# Patient Record
Sex: MALE | Race: WHITE | Employment: OTHER | URBAN - METROPOLITAN AREA
[De-identification: names, ages, dates, MRNs, and addresses within clinical notes are randomized per-mention and may not be internally consistent; named-entity substitution may affect disease eponyms.]

---

## 2017-06-04 ENCOUNTER — HOSPITAL ENCOUNTER (EMERGENCY)
Age: 82
Discharge: HOME | End: 2017-06-04
Attending: EMERGENCY MEDICINE
Payer: MEDICARE

## 2017-06-04 VITALS
TEMPERATURE: 97.8 F | HEART RATE: 61 BPM | SYSTOLIC BLOOD PRESSURE: 150 MMHG | RESPIRATION RATE: 17 BRPM | HEIGHT: 64 IN | DIASTOLIC BLOOD PRESSURE: 98 MMHG | OXYGEN SATURATION: 98 % | WEIGHT: 170 LBS | BODY MASS INDEX: 29.02 KG/M2

## 2017-06-04 DIAGNOSIS — L03.116 CELLULITIS OF LEFT LOWER EXTREMITY: Primary | ICD-10-CM

## 2017-06-04 DIAGNOSIS — I49.8 BIGEMINY: ICD-10-CM

## 2017-06-04 LAB
ALBUMIN SERPL BCP-MCNC: 3.7 G/DL (ref 3.5–5)
ALBUMIN/GLOB SERPL: 1.2 {RATIO} (ref 1.1–2.2)
ALP SERPL-CCNC: 75 U/L (ref 45–117)
ALT SERPL-CCNC: 22 U/L (ref 12–78)
ANION GAP BLD CALC-SCNC: 5 MMOL/L (ref 5–15)
AST SERPL W P-5'-P-CCNC: 18 U/L (ref 15–37)
BASOPHILS # BLD AUTO: 0 K/UL (ref 0–0.1)
BASOPHILS # BLD: 0 % (ref 0–1)
BILIRUB SERPL-MCNC: 1.4 MG/DL (ref 0.2–1)
BUN SERPL-MCNC: 25 MG/DL (ref 6–20)
BUN/CREAT SERPL: 15 (ref 12–20)
CALCIUM SERPL-MCNC: 8.5 MG/DL (ref 8.5–10.1)
CHLORIDE SERPL-SCNC: 107 MMOL/L (ref 97–108)
CK SERPL-CCNC: 107 U/L (ref 39–308)
CO2 SERPL-SCNC: 28 MMOL/L (ref 21–32)
CREAT SERPL-MCNC: 1.71 MG/DL (ref 0.7–1.3)
EOSINOPHIL # BLD: 0.2 K/UL (ref 0–0.4)
EOSINOPHIL NFR BLD: 4 % (ref 0–7)
ERYTHROCYTE [DISTWIDTH] IN BLOOD BY AUTOMATED COUNT: 13.5 % (ref 11.5–14.5)
GLOBULIN SER CALC-MCNC: 3 G/DL (ref 2–4)
GLUCOSE SERPL-MCNC: 161 MG/DL (ref 65–100)
HCT VFR BLD AUTO: 43.3 % (ref 36.6–50.3)
HGB BLD-MCNC: 14.7 G/DL (ref 12.1–17)
LYMPHOCYTES # BLD AUTO: 31 % (ref 12–49)
LYMPHOCYTES # BLD: 2.1 K/UL (ref 0.8–3.5)
MAGNESIUM SERPL-MCNC: 2.3 MG/DL (ref 1.6–2.4)
MCH RBC QN AUTO: 32.1 PG (ref 26–34)
MCHC RBC AUTO-ENTMCNC: 33.9 G/DL (ref 30–36.5)
MCV RBC AUTO: 94.5 FL (ref 80–99)
MONOCYTES # BLD: 0.5 K/UL (ref 0–1)
MONOCYTES NFR BLD AUTO: 8 % (ref 5–13)
NEUTS SEG # BLD: 3.9 K/UL (ref 1.8–8)
NEUTS SEG NFR BLD AUTO: 57 % (ref 32–75)
PLATELET # BLD AUTO: 177 K/UL (ref 150–400)
POTASSIUM SERPL-SCNC: 3.9 MMOL/L (ref 3.5–5.1)
PROT SERPL-MCNC: 6.7 G/DL (ref 6.4–8.2)
RBC # BLD AUTO: 4.58 M/UL (ref 4.1–5.7)
SODIUM SERPL-SCNC: 140 MMOL/L (ref 136–145)
TROPONIN I SERPL-MCNC: <0.04 NG/ML
TSH SERPL DL<=0.05 MIU/L-ACNC: 1.9 UIU/ML (ref 0.36–3.74)
WBC # BLD AUTO: 6.8 K/UL (ref 4.1–11.1)

## 2017-06-04 PROCEDURE — 82550 ASSAY OF CK (CPK): CPT | Performed by: EMERGENCY MEDICINE

## 2017-06-04 PROCEDURE — 84484 ASSAY OF TROPONIN QUANT: CPT | Performed by: EMERGENCY MEDICINE

## 2017-06-04 PROCEDURE — 36415 COLL VENOUS BLD VENIPUNCTURE: CPT | Performed by: EMERGENCY MEDICINE

## 2017-06-04 PROCEDURE — 74011250637 HC RX REV CODE- 250/637: Performed by: EMERGENCY MEDICINE

## 2017-06-04 PROCEDURE — 85025 COMPLETE CBC W/AUTO DIFF WBC: CPT | Performed by: EMERGENCY MEDICINE

## 2017-06-04 PROCEDURE — 80053 COMPREHEN METABOLIC PANEL: CPT | Performed by: EMERGENCY MEDICINE

## 2017-06-04 PROCEDURE — 93005 ELECTROCARDIOGRAM TRACING: CPT

## 2017-06-04 PROCEDURE — 99285 EMERGENCY DEPT VISIT HI MDM: CPT

## 2017-06-04 PROCEDURE — 84443 ASSAY THYROID STIM HORMONE: CPT | Performed by: EMERGENCY MEDICINE

## 2017-06-04 PROCEDURE — 83735 ASSAY OF MAGNESIUM: CPT | Performed by: EMERGENCY MEDICINE

## 2017-06-04 RX ORDER — NYSTATIN 100000 U/G
CREAM TOPICAL 2 TIMES DAILY
Qty: 30 G | Refills: 0 | Status: SHIPPED | OUTPATIENT
Start: 2017-06-04 | End: 2017-06-14

## 2017-06-04 RX ORDER — DOXYCYCLINE HYCLATE 100 MG
100 TABLET ORAL
Status: COMPLETED | OUTPATIENT
Start: 2017-06-04 | End: 2017-06-04

## 2017-06-04 RX ORDER — DOXYCYCLINE HYCLATE 100 MG
100 TABLET ORAL 2 TIMES DAILY
Qty: 20 TAB | Refills: 0 | Status: SHIPPED | OUTPATIENT
Start: 2017-06-04 | End: 2017-06-14

## 2017-06-04 RX ADMIN — DOXYCYCLINE HYCLATE 100 MG: 100 TABLET, COATED ORAL at 16:46

## 2017-06-04 NOTE — ED PROVIDER NOTES
HPI Comments: Bernabe Peterson is a 80 y.o. male, pmhx significant for GERD, hypercholesterolemia, thyroid disease, who presents via EMS to the ED c/o sudden onset bradycardia x today and gradually worsening pruritic and erythematous swollen bump to L inguinal area x 2 weeks. Pt reports that he went into [facility] today to have his skin problem evaluated, as he was concerned it was an infected insect bite. However, upon arriving, they told him that he was bradycardic, so they sent him over the [facility] ED for further evaluation. Pt is asymptomatic upon arrival to the ED. He states that he has been evaluated by his PCP for low HR and dx'd with bigeminy. Of note, pt was taken off of his fluid pill 4 weeks ago, which he endorses taking for 2-3 months. Additionally of note, pt is unsure of the last time he had his thyroid checked. Pt specifically denies wound pain or drainage, CP, SOB, dizziness, lightheadedness, fever, removing any ticks recently, cardiac hx, hx of abscesses, hx of DM, or having a cardiologist.     PCP: Raji Roblero MD      Social Hx: -tobacco, +EtOH (socially), -Illicit Drugs   FHx: no pertinent family hx   Medication Allergies: morphine, penicillins      There are no other complaints, changes, or physical findings at this time. The history is provided by the patient and the EMS personnel. Past Medical History:   Diagnosis Date    Abdominal pain     Dyspepsia and other specified disorders of function of stomach     GERD (gastroesophageal reflux disease)     Hypercholesterolemia     Thyroid disease        Past Surgical History:   Procedure Laterality Date    HX CATARACT REMOVAL      bilateral    HX CATARACT REMOVAL      HX CHOLECYSTECTOMY      HX GI  2010    colon surgery?     HX HERNIA REPAIR      HX OPEN CHOLECYSTECTOMY      HX ORTHOPAEDIC      ingrown left toenail-great toe    HX OTHER SURGICAL      colonoscopy    HX TONSILLECTOMY           Family History:   Problem Relation Age of Onset    Heart Disease Mother     Heart Disease Father        Social History     Social History    Marital status:      Spouse name: N/A    Number of children: N/A    Years of education: N/A     Occupational History    Not on file. Social History Main Topics    Smoking status: Never Smoker    Smokeless tobacco: Not on file    Alcohol use Yes      Comment: social    Drug use: Not on file    Sexual activity: Not on file     Other Topics Concern    Not on file     Social History Narrative    ** Merged History Encounter **              ALLERGIES: Morphine; Morphine; Pcn [penicillins]; and Pcn [penicillins]    Review of Systems   Constitutional: Negative. Negative for appetite change, chills, fatigue and fever. HENT: Negative. Negative for congestion, rhinorrhea, sinus pressure and sore throat. Eyes: Negative. Respiratory: Negative. Negative for cough, choking, chest tightness, shortness of breath and wheezing. Cardiovascular: Negative. Negative for chest pain, palpitations and leg swelling.        + bradycardia    Gastrointestinal: Negative for abdominal pain, constipation, diarrhea, nausea and vomiting. Endocrine: Negative. Genitourinary: Negative. Negative for difficulty urinating, dysuria, flank pain and urgency. Musculoskeletal: Negative. Skin: Negative. + pruritic and erythematous raised area to the L inguinal region, no drainage or pain    Neurological: Negative. Negative for dizziness, speech difficulty, weakness, light-headedness, numbness and headaches. Psychiatric/Behavioral: Negative. All other systems reviewed and are negative. Patient Vitals for the past 12 hrs:   Temp Pulse Resp BP SpO2   06/04/17 1457 - 67 19 - 99 %   06/04/17 1452 97.8 °F (36.6 °C) (!) 34 16 175/51 99 %         Physical Exam   Constitutional: He is oriented to person, place, and time. He appears well-developed and well-nourished. No distress.    HENT:   Head: Normocephalic and atraumatic. Mouth/Throat: Oropharynx is clear and moist. No oropharyngeal exudate. Eyes: Conjunctivae and EOM are normal. Pupils are equal, round, and reactive to light. Neck: Normal range of motion. Neck supple. No JVD present. No tracheal deviation present. Cardiovascular: Normal rate, regular rhythm, normal heart sounds and intact distal pulses. No murmur heard. Pulmonary/Chest: Effort normal and breath sounds normal. No stridor. No respiratory distress. He has no wheezes. He has no rales. He exhibits no tenderness. Abdominal: Soft. He exhibits no distension. There is no tenderness. There is no rebound and no guarding. Musculoskeletal: Normal range of motion. He exhibits no edema or tenderness. Area in left groin, + erythema with warmth, no underlying fluctuance, area about 2x3cm   Neurological: He is alert and oriented to person, place, and time. No cranial nerve deficit. No gross motor or sensory deficits    Skin: Skin is warm and dry. He is not diaphoretic. Psychiatric: He has a normal mood and affect. His behavior is normal.   Nursing note and vitals reviewed.        MDM  Number of Diagnoses or Management Options  Diagnosis management comments: DDx: cellulitis, abscess, electrolyte abnormality, arrhythmia        Amount and/or Complexity of Data Reviewed  Clinical lab tests: ordered and reviewed  Tests in the medicine section of CPT®: ordered and reviewed  Review and summarize past medical records: yes  Discuss the patient with other providers: yes (Cardiology )  Independent visualization of images, tracings, or specimens: yes    Patient Progress  Patient progress: stable    ED Course       Procedures     EKG- Bigeminy rate 69, 1st degree AV block, normal axis/qrs, no acute ST-T wave changes, Kelli Fill, DO      Pt in [place], there is mechanical conduction of PVCs, pt asymptomatic, will discuss with Cardiology, likely pt to be discharged, Kelli Fill, DO    CONSULT NOTE:   4:25 PM  Susan Felix DO spoke with Dr. Donny Moya,   Specialty: Cardiology  Discussed pt's hx, disposition, and available diagnostic and imaging results. Reviewed care plans. Consultant  Recommends follow up. Written by Elsy Britton ED Scribe, as dictated by Susan Felix DO.    DISCHARGE NOTE:  LABORATORY TESTS:  Recent Results (from the past 12 hour(s))   EKG, 12 LEAD, INITIAL    Collection Time: 06/04/17  2:52 PM   Result Value Ref Range    Ventricular Rate 69 BPM    Atrial Rate 69 BPM    P-R Interval 356 ms    QRS Duration 92 ms    Q-T Interval 416 ms    QTC Calculation (Bezet) 445 ms    Calculated P Axis 33 degrees    Calculated R Axis 44 degrees    Calculated T Axis -3 degrees    Diagnosis       Sinus rhythm with 1st degree AV block with frequent premature ventricular   complexes in a pattern of bigeminy  Possible Left atrial enlargement  Cannot rule out Anterior infarct , age undetermined  When compared with ECG of 11-OCT-2010 13:08,  premature ventricular complexes are now present  Inverted T waves have replaced nonspecific T wave abnormality in Inferior   leads  Nonspecific T wave abnormality now evident in Lateral leads  QT has lengthened     CBC WITH AUTOMATED DIFF    Collection Time: 06/04/17  3:06 PM   Result Value Ref Range    WBC 6.8 4.1 - 11.1 K/uL    RBC 4.58 4.10 - 5.70 M/uL    HGB 14.7 12.1 - 17.0 g/dL    HCT 43.3 36.6 - 50.3 %    MCV 94.5 80.0 - 99.0 FL    MCH 32.1 26.0 - 34.0 PG    MCHC 33.9 30.0 - 36.5 g/dL    RDW 13.5 11.5 - 14.5 %    PLATELET 993 802 - 539 K/uL    NEUTROPHILS 57 32 - 75 %    LYMPHOCYTES 31 12 - 49 %    MONOCYTES 8 5 - 13 %    EOSINOPHILS 4 0 - 7 %    BASOPHILS 0 0 - 1 %    ABS. NEUTROPHILS 3.9 1.8 - 8.0 K/UL    ABS. LYMPHOCYTES 2.1 0.8 - 3.5 K/UL    ABS. MONOCYTES 0.5 0.0 - 1.0 K/UL    ABS. EOSINOPHILS 0.2 0.0 - 0.4 K/UL    ABS.  BASOPHILS 0.0 0.0 - 0.1 K/UL   METABOLIC PANEL, COMPREHENSIVE    Collection Time: 06/04/17  3:06 PM   Result Value Ref Range Sodium 140 136 - 145 mmol/L    Potassium 3.9 3.5 - 5.1 mmol/L    Chloride 107 97 - 108 mmol/L    CO2 28 21 - 32 mmol/L    Anion gap 5 5 - 15 mmol/L    Glucose 161 (H) 65 - 100 mg/dL    BUN 25 (H) 6 - 20 MG/DL    Creatinine 1.71 (H) 0.70 - 1.30 MG/DL    BUN/Creatinine ratio 15 12 - 20      GFR est AA 46 (L) >60 ml/min/1.73m2    GFR est non-AA 38 (L) >60 ml/min/1.73m2    Calcium 8.5 8.5 - 10.1 MG/DL    Bilirubin, total 1.4 (H) 0.2 - 1.0 MG/DL    ALT (SGPT) 22 12 - 78 U/L    AST (SGOT) 18 15 - 37 U/L    Alk. phosphatase 75 45 - 117 U/L    Protein, total 6.7 6.4 - 8.2 g/dL    Albumin 3.7 3.5 - 5.0 g/dL    Globulin 3.0 2.0 - 4.0 g/dL    A-G Ratio 1.2 1.1 - 2.2     CK W/ REFLX CKMB    Collection Time: 06/04/17  3:06 PM   Result Value Ref Range     39 - 308 U/L   TROPONIN I    Collection Time: 06/04/17  3:06 PM   Result Value Ref Range    Troponin-I, Qt. <0.04 <0.05 ng/mL   MAGNESIUM    Collection Time: 06/04/17  3:06 PM   Result Value Ref Range    Magnesium 2.3 1.6 - 2.4 mg/dL   TSH 3RD GENERATION    Collection Time: 06/04/17  3:06 PM   Result Value Ref Range    TSH 1.90 0.36 - 3.74 uIU/mL     MEDICATIONS GIVEN:  Medications   doxycycline (VIBRA-TABS) tablet 100 mg (not administered)       IMPRESSION:  1. Cellulitis of left lower extremity    2. Bigeminy        PLAN:  Current Discharge Medication List        Follow-up Information     None        Return to ED if worse     DISCHARGE NOTE:  4:36 PM  Pt has been reexamined. Pt has no new complaints, changes, or physical findings. Care plan outlined and precautions discussed. All available results reviewed with pt. All medications reviewed with pt. All of pts questions and concerns addressed. Pt agrees to f/u as instructed and agrees to return to ED upon further deterioration. Pt is ready to go home.   Written by Benito Fernandez ED Scribe as dictated by Kenyatta Baker DO    ATTESTATION   This note is prepared by Benito Fernandez acting as scribe for Kenyatta Baker, DO    Harl Necessary, DO : The scribe's documentation has been prepared under my direction and personally reviewed by me in its entirety. I confirm that the note above accurately reflects all work, treatment, procedures, and medical decision making performed by me.

## 2017-06-04 NOTE — DISCHARGE INSTRUCTIONS
Cellulitis: Care Instructions  Your Care Instructions    Cellulitis is a skin infection. It often occurs after a break in the skin from a scrape, cut, bite, or puncture, or after a rash. The doctor has checked you carefully, but problems can develop later. If you notice any problems or new symptoms, get medical treatment right away. Follow-up care is a key part of your treatment and safety. Be sure to make and go to all appointments, and call your doctor if you are having problems. It's also a good idea to know your test results and keep a list of the medicines you take. How can you care for yourself at home? · Take your antibiotics as directed. Do not stop taking them just because you feel better. You need to take the full course of antibiotics. · Prop up the infected area on pillows to reduce pain and swelling. Try to keep the area above the level of your heart as often as you can. · If your doctor told you how to care for your wound, follow your doctor's instructions. If you did not get instructions, follow this general advice:  ¨ Wash the wound with clean water 2 times a day. Don't use hydrogen peroxide or alcohol, which can slow healing. ¨ You may cover the wound with a thin layer of petroleum jelly, such as Vaseline, and a nonstick bandage. ¨ Apply more petroleum jelly and replace the bandage as needed. · Be safe with medicines. Take pain medicines exactly as directed. ¨ If the doctor gave you a prescription medicine for pain, take it as prescribed. ¨ If you are not taking a prescription pain medicine, ask your doctor if you can take an over-the-counter medicine. To prevent cellulitis in the future  · Try to prevent cuts, scrapes, or other injuries to your skin. Cellulitis most often occurs where there is a break in the skin. · If you get a scrape, cut, mild burn, or bite, wash the wound with clean water as soon as you can to help avoid infection.  Don't use hydrogen peroxide or alcohol, which can slow healing. · If you have swelling in your legs (edema), support stockings and good skin care may help prevent leg sores and cellulitis. · Take care of your feet, especially if you have diabetes or other conditions that increase the risk of infection. Wear shoes and socks. Do not go barefoot. If you have athlete's foot or other skin problems on your feet, talk to your doctor about how to treat them. When should you call for help? Call your doctor now or seek immediate medical care if:  · You have signs that your infection is getting worse, such as:  ¨ Increased pain, swelling, warmth, or redness. ¨ Red streaks leading from the area. ¨ Pus draining from the area. ¨ A fever. · You get a rash. Watch closely for changes in your health, and be sure to contact your doctor if:  · You are not getting better after 1 day (24 hours). · You do not get better as expected. Where can you learn more? Go to http://sarah beth-srinivas.info/. Matt Lemons in the search box to learn more about \"Cellulitis: Care Instructions. \"  Current as of: October 13, 2016  Content Version: 11.2  © 3095-1530 [employer]. Care instructions adapted under license by [employer] (which disclaims liability or warranty for this information). If you have questions about a medical condition or this instruction, always ask your healthcare professional. [place] [address] any warranty or liability for your use of this information. Cardiac Arrhythmia: Care Instructions  Your Care Instructions    A cardiac arrhythmia is a change in the normal rhythm of the heart. Your heart may beat too fast or too slow or beat with an irregular or skipping rhythm. A change in the heart's rhythm may feel like a really strong heartbeat or a fluttering in your chest. A severe heart rhythm problem can keep the body from getting the blood it needs.  This can result in shortness of breath, lightheadedness, and fainting. You may take medicine to treat your condition. Your doctor may recommend a pacemaker or recommend catheter ablation to destroy small parts of the heart that are causing a rhythm problem. Another possible treatment is an implantable cardioverter-defibrillator (ICD). An ICD is a device that gives the heart a shock to return the heart to a normal rhythm. Follow-up care is a key part of your treatment and safety. Be sure to make and go to all appointments, and call your doctor if you are having problems. It's also a good idea to know your test results and keep a list of the medicines you take. How can you care for yourself at home? General care  · Be safe with medicines. Take your medicines exactly as prescribed. Call your doctor if you think you are having a problem with your medicine. You will get more details on the specific medicines your doctor prescribes. · If you received a pacemaker or an ICD, you will get a fact sheet about it. · Wear medical alert jewelry that says you have an abnormal heart rhythm. You can buy this at most drugstores. Lifestyle changes  · Eat a heart-healthy diet. · Stay at a healthy weight. Lose weight if you need to. · Avoid nicotine, too much alcohol, and illegal drugs (meth, speed, and cocaine). Also, get enough sleep and do not overeat. · Ask your doctor whether you can take over-the-counter medicines (such as decongestants). These can make your heart beat fast.  · Talk to your doctor about any limits to activities, such as driving, or tasks where you use power tools or ladders. Activity  · Start light exercise if your doctor says you can. Even a small amount will help you get stronger, have more energy, and manage your stress. · If your doctor recommends it, get more exercise. Walking is a good choice. Bit by bit, increase the amount you walk every day. Try for at least 30 minutes on most days of the week.  You also may want to swim, bike, or do other activities. · When you exercise, watch for signs that your heart is working too hard. You are pushing too hard if you cannot talk while you exercise. If you become short of breath or dizzy or have chest pain, sit down and rest.  · Check your pulse daily. Place two fingers on the artery at the palm side of your wrist, in line with your thumb. If your heartbeat seems uneven, talk to your doctor. When should you call for help? Call 911 anytime you think you may need emergency care. For example, call if:  · You passed out (lost consciousness). · You have symptoms of a heart attack. These may include:  ¨ Chest pain or pressure, or a strange feeling in the chest.  ¨ Sweating. ¨ Shortness of breath. ¨ Nausea or vomiting. ¨ Pain, pressure, or a strange feeling in the back, neck, jaw, or upper belly or in one or both shoulders or arms. ¨ Lightheadedness or sudden weakness. ¨ A fast or irregular heartbeat. After you call 911, the  may tell you to chew 1 adult-strength or 2 to 4 low-dose aspirin. Wait for an ambulance. Do not try to drive yourself. · You have signs of a stroke. These include:  ¨ Sudden numbness, paralysis, or weakness in your face, arm, or leg, especially on only one side of your body. ¨ New problems with walking or balance. ¨ Sudden vision changes. ¨ Drooling or slurred speech. ¨ New problems speaking or understanding simple statements, or feeling confused. ¨ A sudden, severe headache that is different from past headaches. Call your doctor now or seek immediate medical care if:  · You are dizzy or lightheaded, or you feel like you may faint. · You have new or increased shortness of breath. · You had surgery and you have signs of infection, such as:  ¨ Increased pain, swelling, warmth, or redness. ¨ Red streaks leading from the cut (incision). ¨ Pus draining from the incision. ¨ A fever.   Watch closely for changes in your health, and be sure to contact your doctor if:  · You do not get better as expected. Where can you learn more? Go to http://sarah beth-srinivas.info/. Enter T178 in the search box to learn more about \"Cardiac Arrhythmia: Care Instructions. \"  Current as of: May 5, 2016  Content Version: 11.2  © 6284-8091 [employer]. Care instructions adapted under license by [employer] (which disclaims liability or warranty for this information). If you have questions about a medical condition or this instruction, always ask your healthcare professional. [address] any warranty or liability for your use of this information.

## 2017-06-04 NOTE — ROUTINE PROCESS
Dr Linus Ferreira reviewed discharge instructions with the patient. The patient verbalized understanding. Accompanied by his son. Alert and stable to walk at discharge.

## 2017-06-05 LAB
ATRIAL RATE: 69 BPM
CALCULATED P AXIS, ECG09: 33 DEGREES
CALCULATED R AXIS, ECG10: 44 DEGREES
CALCULATED T AXIS, ECG11: -3 DEGREES
DIAGNOSIS, 93000: NORMAL
P-R INTERVAL, ECG05: 356 MS
Q-T INTERVAL, ECG07: 416 MS
QRS DURATION, ECG06: 92 MS
QTC CALCULATION (BEZET), ECG08: 445 MS
VENTRICULAR RATE, ECG03: 69 BPM

## 2017-08-29 ENCOUNTER — CLINICAL SUPPORT (OUTPATIENT)
Dept: CARDIOLOGY CLINIC | Age: 82
End: 2017-08-29

## 2017-08-29 ENCOUNTER — HOSPITAL ENCOUNTER (OUTPATIENT)
Dept: LAB | Age: 82
Discharge: HOME | End: 2017-08-29
Payer: MEDICARE

## 2017-08-29 ENCOUNTER — OFFICE VISIT (OUTPATIENT)
Dept: CARDIOLOGY CLINIC | Age: 82
End: 2017-08-29

## 2017-08-29 VITALS
SYSTOLIC BLOOD PRESSURE: 140 MMHG | RESPIRATION RATE: 16 BRPM | OXYGEN SATURATION: 96 % | HEIGHT: 64 IN | DIASTOLIC BLOOD PRESSURE: 66 MMHG | BODY MASS INDEX: 29.06 KG/M2 | HEART RATE: 65 BPM | WEIGHT: 170.2 LBS

## 2017-08-29 DIAGNOSIS — I49.3 PVC (PREMATURE VENTRICULAR CONTRACTION): ICD-10-CM

## 2017-08-29 DIAGNOSIS — R00.1 SLOW HEART RATE: ICD-10-CM

## 2017-08-29 DIAGNOSIS — I44.0 AV BLOCK, 1ST DEGREE: ICD-10-CM

## 2017-08-29 DIAGNOSIS — R00.1 SLOW HEART RATE: Primary | ICD-10-CM

## 2017-08-29 DIAGNOSIS — R00.1 BRADYCARDIA: ICD-10-CM

## 2017-08-29 DIAGNOSIS — N28.9 RENAL INSUFFICIENCY: ICD-10-CM

## 2017-08-29 PROCEDURE — 36415 COLL VENOUS BLD VENIPUNCTURE: CPT

## 2017-08-29 PROCEDURE — 80048 BASIC METABOLIC PNL TOTAL CA: CPT

## 2017-08-29 RX ORDER — MONTELUKAST SODIUM 4 MG/1
1 TABLET, CHEWABLE ORAL 2 TIMES DAILY
COMMUNITY
End: 2020-12-01 | Stop reason: SDUPTHER

## 2017-08-29 NOTE — PROGRESS NOTES
8/29/2017 11:11 AM      Subjective:     OLESYA Derek Rodrigues is seen in office today for further evaluation. Referred from ER after noted to have bradycardia and PVCs. Apparently went to urgent care and due to slow heart rate was tx to ER. He denies chest pain, chest pressure/discomfort, dyspnea, palpitations, irregular heart beats, near-syncope, syncope, fatigue, orthopnea, paroxysmal nocturnal dyspnea, exertional chest pressure/discomfort, claudication, lower extremity edema. Visit Vitals    /66 (BP 1 Location: Right arm, BP Patient Position: Sitting)    Pulse 65    Resp 16    Ht 5' 4\" (1.626 m)    Wt 170 lb 3.2 oz (77.2 kg)    SpO2 96%    BMI 29.21 kg/m2     Current Outpatient Prescriptions   Medication Sig    colestipol (COLESTID) 1 gram tablet Take 1 g by mouth two (2) times a day.  omeprazole (PRILOSEC) 20 mg capsule Take 20 mg by mouth daily.  levothyroxine (SYNTHROID) 100 mcg tablet Take 100 mcg by mouth daily (before breakfast). No current facility-administered medications for this visit. Objective:      Visit Vitals    /66 (BP 1 Location: Right arm, BP Patient Position: Sitting)    Pulse 65    Resp 16    Ht 5' 4\" (1.626 m)    Wt 170 lb 3.2 oz (77.2 kg)    SpO2 96%    BMI 29.21 kg/m2       Data Review:     EKG: Sinus rhythm, 1st degree AV block, PVCs. Vent bigeminy. Reviewed and/or ordered active problem list, medication list tests    Past Medical History:   Diagnosis Date    Abdominal pain     Dyspepsia and other specified disorders of function of stomach     GERD (gastroesophageal reflux disease)     Hypercholesterolemia     Thyroid disease       Past Surgical History:   Procedure Laterality Date    HX CATARACT REMOVAL      bilateral    HX CATARACT REMOVAL      HX CHOLECYSTECTOMY      HX GI  2010    colon surgery?     HX HERNIA REPAIR      HX OPEN CHOLECYSTECTOMY      HX ORTHOPAEDIC      ingrown left toenail-great toe    HX OTHER SURGICAL      colonoscopy    HX TONSILLECTOMY       Allergies   Allergen Reactions    Morphine Nausea and Vomiting    Morphine Nausea and Vomiting    Pcn [Penicillins] Rash and Swelling    Pcn [Penicillins] Rash      Family History   Problem Relation Age of Onset    Heart Disease Mother     Heart Disease Father       Social History     Social History    Marital status:      Spouse name: N/A    Number of children: N/A    Years of education: N/A     Occupational History    Not on file. Social History Main Topics    Smoking status: Never Smoker    Smokeless tobacco: Not on file    Alcohol use Yes      Comment: social    Drug use: Not on file    Sexual activity: Not on file     Other Topics Concern    Not on file     Social History Narrative    ** Merged History Encounter **            Review of Systems     General: Not Present- Anorexia, Chills, Dietary Changes, Fatigue, Fever, Medication Changes, Night Sweats, Weight Gain > 10lbs. and Weight Loss > 10lbs. .  Skin: Not Present- Bruising and Excessive Sweating. HEENT: Not Present- Headache, Visual Loss and Vertigo. Respiratory: Not Present- Cough, Decreased Exercise Tolerance, Difficulty Breathing, Snoring and Wheezing. Cardiovascular: Not Present- Abnormal Blood Pressure, Chest Pain, Claudications, Difficulty Breathing On Exertion, Edema, Fainting / Blacking Out, Irregular Heart Beat, Night Cramps, Orthopnea, Palpitations, Paroxysmal Nocturnal Dyspnea, Rapid Heart Rate, Shortness of Breath and Swelling of Extremities. Gastrointestinal: Not Present- Black, Tarry Stool, Bloody Stool, Diarrhea, Hematemesis, Rectal Bleeding and Vomiting. Musculoskeletal: Not Present- Muscle Pain and Muscle Weakness. Neurological: Not Present- Dizziness. Psychiatric: Not Present- Depression. Endocrine: Not Present- Cold Intolerance, Heat Intolerance and Thyroid Problems.   Hematology: Not Present- Abnormal Bleeding, Anemia, Blood Clots and Easy Bruising.       Physical Exam   The physical exam findings are as follows:       General   Mental Status - Alert. General Appearance - Cooperative and Well groomed. Not in acute distress. Orientation - Oriented to time, Oriented to place and Oriented to person. Build & Nutrition - Well developed. HEENT  Head - Normal.  Eye - Normal.      Neck   Carotid Arteries - normal upstroke. No Bruits. Chest and Lung Exam   Inspection:   Chest Wall: - Normal. Accessory muscles - No use of accessory muscles in breathing. Auscultation:   Breath sounds: - Normal.      Cardiovascular   Inspection: Jugular vein - Bilateral - Inspection Normal.  Palpation/Percussion:   Apical Impulse: - Normal.  Auscultation: Rhythm - Regular. Heart Sounds - S1 WNL and S2 WNL. No S3 or S4. Murmurs & Other Heart Sounds: Auscultation of the heart reveals - No Murmurs. Abdomen   Palpation/Percussion: Palpation and Percussion of the abdomen reveal - No Palpable abdominal masses. Auscultation: Auscultation of the abdomen reveals - Bowel sounds normal.      Peripheral Vascular   Upper Extremity: Inspection - Bilateral - No Cyanotic nailbeds or Digital clubbing. Palpation: Radial pulse - Bilateral - Normal.  Lower Extremity:   Palpation: Dorsalis pedis pulse - Bilateral - Normal. Posterior tibia pulse - Bilateral - Normal. Edema - Bilateral - trace edema. Neurologic   Mental Status: Affect - normal.  Motor: - Normal. Gait - Normal.        Assessment:       ICD-10-CM ICD-9-CM    1. Slow heart rate R00.1 427.89 AMB POC EKG ROUTINE W/ 12 LEADS, INTER & REP      METABOLIC PANEL, BASIC      2D ECHO COMPLETE ADULT (TTE) W OR WO CONTR      HOLTER MONITOR   2. PVC (premature ventricular contraction) F98.8 499.21 METABOLIC PANEL, BASIC      2D ECHO COMPLETE ADULT (TTE) W OR WO CONTR      HOLTER MONITOR   3. Bradycardia G41.3 647.82 METABOLIC PANEL, BASIC      2D ECHO COMPLETE ADULT (TTE) W OR WO CONTR      HOLTER MONITOR   4.  Renal insufficiency K01.1 697.7 METABOLIC PANEL, BASIC      2D ECHO COMPLETE ADULT (TTE) W OR WO CONTR      HOLTER MONITOR   5. AV block, 1st degree I44.0 426.11        Plan:     1. Bradycardia, PVC: clinically asymptomatic. Check Echo and Holter. Normal TSH. 2. Recheck BMP to f/u on Cr.

## 2017-08-29 NOTE — MR AVS SNAPSHOT
Visit Information Date & Time Provider Department Dept. Phone Encounter #  
 8/29/2017 10:30 AM Georgina Villalobos, [address] [facility] [phone] [id number] Follow-up Instructions Return in about 6 months (around 2/28/2018). Routing History Follow-up and Disposition History Upcoming Health Maintenance Date Due DTaP/Tdap/Td series (1 - Tdap) 10/25/1949 ZOSTER VACCINE AGE 60> 8/25/1988 GLAUCOMA SCREENING Q2Y 10/25/1993 Pneumococcal 65+ Low/Medium Risk (1 of 2 - PCV13) 10/25/1993 MEDICARE YEARLY EXAM 10/25/1993 INFLUENZA AGE 9 TO ADULT 8/1/2017 Allergies as of 8/29/2017  Review Complete On: 8/29/2017 By: Georgina Villalobos MD  
  
 Severity Noted Reaction Type Reactions Morphine  10/11/2010   Side Effect Nausea and Vomiting Morphine  07/09/2013   Side Effect Nausea and Vomiting Pcn [Penicillins]  10/11/2010   Side Effect Rash, Swelling Pcn [Penicillins]  07/09/2013   Systemic Rash Current Immunizations  Reviewed on 10/25/2010 No immunizations on file. Not reviewed this visit You Were Diagnosed With   
  
 Codes Comments Slow heart rate    -  Primary ICD-10-CM: R00.1 ICD-9-CM: 427.89 PVC (premature ventricular contraction)     ICD-10-CM: I49.3 ICD-9-CM: 427.69 Bradycardia     ICD-10-CM: R00.1 ICD-9-CM: 427.89 Renal insufficiency     ICD-10-CM: N28.9 ICD-9-CM: 593.9 AV block, 1st degree     ICD-10-CM: I44.0 ICD-9-CM: 426.11 Vitals BP Pulse Resp Height(growth percentile) Weight(growth percentile) SpO2  
 140/66 (BP 1 Location: Right arm, BP Patient Position: Sitting) 65 16 5' 4\" (1.626 m) 170 lb 3.2 oz (77.2 kg) 96% BMI Smoking Status 29.21 kg/m2 Never Smoker Vitals History BMI and BSA Data Body Mass Index Body Surface Area  
 29.21 kg/m 2 1.87 m 2 Preferred Pharmacy Pharmacy Name Phone [id number] [facility] [phone] Your Updated Medication List  
  
   
This list is accurate as of: 8/29/17 11:19 AM.  Always use your most recent med list.  
  
  
  
  
 COLESTID 1 gram tablet Generic drug:  colestipol Take 1 g by mouth two (2) times a day. omeprazole 20 mg capsule Commonly known as:  PRILOSEC Take 20 mg by mouth daily. SYNTHROID 100 mcg tablet Generic drug:  levothyroxine Take 100 mcg by mouth daily (before breakfast). We Performed the Following AMB POC EKG ROUTINE W/ 12 LEADS, INTER & REP [43813 CPT(R)] METABOLIC PANEL, BASIC [99545 CPT(R)] Follow-up Instructions Return in about 6 months (around 2/28/2018). To-Do List   
 08/29/2017 ECHO:  2D ECHO COMPLETE ADULT (TTE) W OR WO CONTR   
  
 08/29/2017 ECG:  HOLTER MONITOR Introducing [facility] SERVICES! Yahaira Wang introduces FasterPants patient portal. Now you can access parts of your medical record, email your doctor's office, and request medication refills online. 1. In your internet browser, go to [email or web address] TrustCenter 2. Click on the First Time User? Click Here link in the Sign In box. You will see the New Member Sign Up page. 3. Enter your FasterPants Access Code exactly as it appears below. You will not need to use this code after youve completed the sign-up process. If you do not sign up before the expiration date, you must request a new code. · FasterPants Access Code: [email or web address] Expires: 9/2/2017  3:39 PM 
 
4. Enter the last four digits of your Social Security Number (xxxx) and Date of Birth (mm/dd/yyyy) as indicated and click Submit. You will be taken to the next sign-up page. 5. Create a FasterPants ID. This will be your FasterPants login ID and cannot be changed, so think of one that is secure and easy to remember. 6. Create a DeciZium password. You can change your password at any time. 7. Enter your Password Reset Question and Answer. This can be used at a later time if you forget your password. 8. Enter your e-mail address. You will receive e-mail notification when new information is available in [address] 9. Click Sign Up. You can now view and download portions of your medical record. 10. Click the Download Summary menu link to download a portable copy of your medical information. If you have questions, please visit the Frequently Asked Questions section of the DeciZium website. Remember, DeciZium is NOT to be used for urgent needs. For medical emergencies, dial 911. Now available from your iPhone and Android! Please provide this summary of care documentation to your next provider. Your primary care clinician is listed as Ramona Space. If you have any questions after today's visit, please call [phone].

## 2017-08-29 NOTE — PROGRESS NOTES
Chief Complaint   Patient presents with    New Patient     referred by Dr Kadie Boudreaux due to slow heart rate    Slow Heart Rate

## 2017-08-30 ENCOUNTER — TELEPHONE (OUTPATIENT)
Dept: CARDIOLOGY CLINIC | Age: 82
End: 2017-08-30

## 2017-08-30 LAB
BUN SERPL-MCNC: 25 MG/DL (ref 8–27)
BUN/CREAT SERPL: 18 (ref 10–24)
CALCIUM SERPL-MCNC: 8.8 MG/DL (ref 8.6–10.2)
CHLORIDE SERPL-SCNC: 104 MMOL/L (ref 96–106)
CO2 SERPL-SCNC: 19 MMOL/L (ref 18–29)
CREAT SERPL-MCNC: 1.42 MG/DL (ref 0.76–1.27)
GLUCOSE SERPL-MCNC: 137 MG/DL (ref 65–99)
INTERPRETATION: NORMAL
POTASSIUM SERPL-SCNC: 4.4 MMOL/L (ref 3.5–5.2)
SODIUM SERPL-SCNC: 140 MMOL/L (ref 134–144)

## 2017-08-30 NOTE — TELEPHONE ENCOUNTER
----- Message from Marito Aranda MD sent at 8/30/2017 11:01 AM EDT -----  Inform him labs are ok. Message  Received: 4[address]MD Camilla LPN                     Inform pt that Echo is ok         Left message to return my call. Called daughter Merly Cullen on hippa form. Advised her I called her because I could not get a hold of her father and I had some wrong numbers in our system. She asked that I call back in 5 minutes and she will verify the numbers with me. I told her I would call back in 5 minutes. She verbalized that she understood everything. Returned daughter Ulisses Grimm call -on hippa form, received correct numbers from her. The pt's numbers are [phone] for home # and his cell # [phone]- advised we did not have the correct cell number. Gave her the test results. Advised that pt's labs and echo are okay. Asked that she relay message to her father and I would also try the cell number. She verbalized that she understood everything. Called pt on cell number and left message that I relayed his test results to his daughter Ulisses Grimm and he could also call me back.

## 2017-08-31 ENCOUNTER — OFFICE VISIT (OUTPATIENT)
Dept: CARDIOLOGY CLINIC | Age: 82
End: 2017-08-31

## 2017-08-31 ENCOUNTER — CLINICAL SUPPORT (OUTPATIENT)
Dept: CARDIOLOGY CLINIC | Age: 82
End: 2017-08-31

## 2017-08-31 VITALS
OXYGEN SATURATION: 98 % | SYSTOLIC BLOOD PRESSURE: 110 MMHG | WEIGHT: 171.2 LBS | RESPIRATION RATE: 18 BRPM | HEIGHT: 64 IN | BODY MASS INDEX: 29.23 KG/M2 | DIASTOLIC BLOOD PRESSURE: 66 MMHG | HEART RATE: 50 BPM

## 2017-08-31 DIAGNOSIS — I49.3 PVC (PREMATURE VENTRICULAR CONTRACTION): ICD-10-CM

## 2017-08-31 DIAGNOSIS — R00.1 BRADYCARDIA: ICD-10-CM

## 2017-08-31 DIAGNOSIS — R00.1 SLOW HEART RATE: ICD-10-CM

## 2017-08-31 DIAGNOSIS — N28.9 RENAL INSUFFICIENCY: ICD-10-CM

## 2017-08-31 DIAGNOSIS — I44.1 MOBITZ TYPE 1 SECOND DEGREE AV BLOCK: Primary | ICD-10-CM

## 2017-08-31 DIAGNOSIS — I44.0 AV BLOCK, 1ST DEGREE: ICD-10-CM

## 2017-08-31 NOTE — MR AVS SNAPSHOT
Visit Information Date & Time Provider Department Dept. Phone Encounter #  
 8/31/2017 10:00 AM Libra Solomon, 1024 [facility] [phone] [id number] Your Appointments 8/31/2017 11:00 AM  
ECHO CARDIOGRAMS 2D with [address], [facility] [address]) Appt Note: Dr. Shania Chisholm (TTE) W OR WO CONTR [[id number]] (Order [id number]) ,roxie; add on per Dr. Helen Logan, ok'd w/ Ramu  ekr [id number] [facility] [address]  
[phone] [address] [facility] [address] Upcoming Health Maintenance Date Due DTaP/Tdap/Td series (1 - Tdap) 10/25/1949 ZOSTER VACCINE AGE 60> 8/25/1988 GLAUCOMA SCREENING Q2Y 10/25/1993 Pneumococcal 65+ Low/Medium Risk (1 of 2 - PCV13) 10/25/1993 MEDICARE YEARLY EXAM 10/25/1993 INFLUENZA AGE 9 TO ADULT 8/1/2017 Allergies as of 8/31/2017  Review Complete On: 8/31/2017 By: Libra Solomon MD  
  
 Severity Noted Reaction Type Reactions Morphine  10/11/2010   Side Effect Nausea and Vomiting Morphine  07/09/2013   Side Effect Nausea and Vomiting Pcn [Penicillins]  10/11/2010   Side Effect Rash, Swelling Pcn [Penicillins]  07/09/2013   Systemic Rash Current Immunizations  Reviewed on 10/25/2010 No immunizations on file. Not reviewed this visit You Were Diagnosed With   
  
 Codes Comments Mobitz type 1 second degree AV block    -  Primary ICD-10-CM: I44.1 ICD-9-CM: 426.13 Bradycardia     ICD-10-CM: R00.1 ICD-9-CM: 427.89 AV block, 1st degree     ICD-10-CM: I44.0 ICD-9-CM: 426.11 PVC (premature ventricular contraction)     ICD-10-CM: I49.3 ICD-9-CM: 427.69 Vitals BP Pulse Resp Height(growth percentile) Weight(growth percentile) SpO2  
 110/66 (BP 1 Location: Right arm, BP Patient Position: Sitting) (!) 50 18 5' 4\" (1.626 m) 171 lb 3.2 oz (77.7 kg) 98% BMI Smoking Status 29.39 kg/m2 Never Smoker Vitals History BMI and BSA Data Body Mass Index Body Surface Area  
 29.39 kg/m 2 1.87 m 2 Preferred Pharmacy Pharmacy Name Phone [id number] [facility] [phone] Your Updated Medication List  
  
   
This list is accurate as of: 8/31/17 10:35 AM.  Always use your most recent med list.  
  
  
  
  
 COLESTID 1 gram tablet Generic drug:  colestipol Take 1 g by mouth two (2) times a day. omeprazole 20 mg capsule Commonly known as:  PRILOSEC Take 20 mg by mouth daily. SYNTHROID 100 mcg tablet Generic drug:  levothyroxine Take 100 mcg by mouth daily (before breakfast). We Performed the Following AMB POC EKG ROUTINE W/ 12 LEADS, INTER & REP [59818 CPT(R)] Introducing [facility]! Wenceslao Barlow introduces Hol[employer]a patient portal. Now you can access parts of your medical record, email your doctor's office, and request medication refills online. 1. In your internet browser, go to [email or web address] 2. Click on the First Time User? Click Here link in the Sign In box. You will see the New Member Sign Up page. 3. Enter your Holaira Access Code exactly as it appears below. You will not need to use this code after youve completed the sign-up process. If you do not sign up before the expiration date, you must request a new code. · Holaira Access Code: [email or web address] Expires: 9/2/2017  3:39 PM 
 
4. Enter the last four digits of your Social Security Number (xxxx) and Date of Birth (mm/dd/yyyy) as indicated and click Submit. You will be taken to the next sign-up page. 5. Create a Navdyt ID. This will be your Holaira login ID and cannot be changed, so think of one that is secure and easy to remember. 6. Create a Navdyt password. You can change your password at any time. 7. Enter your Password Reset Question and Answer. This can be used at a later time if you forget your password. 8. Enter your e-mail address. You will receive e-mail notification when new information is available in [address] 9. Click Sign Up. You can now view and download portions of your medical record. 10. Click the Download Summary menu link to download a portable copy of your medical information. If you have questions, please visit the Frequently Asked Questions section of the WISE s.r.l website. Remember, W[employer] s[employer].l is NOT to be used for urgent needs. For medical emergencies, dial 911. Now available from your iPhone and Android! Please provide this summary of care documentation to your next provider. Your primary care clinician is listed as Harsha Cohen. If you have any questions after today's visit, please call [phone].

## 2017-08-31 NOTE — PROGRESS NOTES
8/31/2017 10:34 AM      Subjective:     OLESYA Butler is here for f/u visit. He denies chest pain, chest pressure/discomfort, dyspnea, palpitations, irregular heart beats, near-syncope, syncope, fatigue, orthopnea, paroxysmal nocturnal dyspnea, exertional chest pressure/discomfort, claudication, lower extremity edema. Visit Vitals    /66 (BP 1 Location: Right arm, BP Patient Position: Sitting)    Pulse (!) 50    Resp 18    Ht 5' 4\" (1.626 m)    Wt 171 lb 3.2 oz (77.7 kg)    SpO2 98%    BMI 29.39 kg/m2     Current Outpatient Prescriptions   Medication Sig    colestipol (COLESTID) 1 gram tablet Take 1 g by mouth two (2) times a day.  omeprazole (PRILOSEC) 20 mg capsule Take 20 mg by mouth daily.  levothyroxine (SYNTHROID) 100 mcg tablet Take 100 mcg by mouth daily (before breakfast). No current facility-administered medications for this visit. Objective:      Visit Vitals    /66 (BP 1 Location: Right arm, BP Patient Position: Sitting)    Pulse (!) 50    Resp 18    Ht 5' 4\" (1.626 m)    Wt 171 lb 3.2 oz (77.7 kg)    SpO2 98%    BMI 29.39 kg/m2       Data Review:    EKG: Normal sinus rhythm, 1st degree AV block, PVCs    Reviewed and/or ordered active problem list, medication list tests    Past Medical History:   Diagnosis Date    Abdominal pain     Dyspepsia and other specified disorders of function of stomach     GERD (gastroesophageal reflux disease)     Hypercholesterolemia     Thyroid disease       Past Surgical History:   Procedure Laterality Date    HX CATARACT REMOVAL      bilateral    HX CATARACT REMOVAL      HX CHOLECYSTECTOMY      HX GI  2010    colon surgery?     HX HERNIA REPAIR      HX OPEN CHOLECYSTECTOMY      HX ORTHOPAEDIC      ingrown left toenail-great toe    HX OTHER SURGICAL      colonoscopy    HX TONSILLECTOMY       Allergies   Allergen Reactions    Morphine Nausea and Vomiting    Morphine Nausea and Vomiting  Pcn [Penicillins] Rash and Swelling    Pcn [Penicillins] Rash      Family History   Problem Relation Age of Onset    Heart Disease Mother     Heart Disease Father       Social History     Social History    Marital status:      Spouse name: N/A    Number of children: N/A    Years of education: N/A     Occupational History    Not on file. Social History Main Topics    Smoking status: Never Smoker    Smokeless tobacco: Not on file    Alcohol use Yes      Comment: social    Drug use: Not on file    Sexual activity: Not on file     Other Topics Concern    Not on file     Social History Narrative    ** Merged History Encounter **            Review of Systems   General: Not Present- Dietary Changes, Fatigue and Medication Changes. Skin: Not Present- Bruising and Excessive Sweating. HEENT: Not Present- Headache, Visual Loss and Vertigo. Respiratory: Not Present- Cough, Decreased Exercise Tolerance and Wheezing. Cardiovascular: Not Present- Abnormal Blood Pressure, Chest Pain, Claudications, Difficulty Breathing Lying Down, Difficulty Breathing On Exertion, Edema, Fainting / Blacking Out, Irregular Heart Beat, Palpitations, Paroxysmal Nocturnal Dyspnea, Rapid Heart Rate and Shortness of Breath. Gastrointestinal: Not Present- Black, Tarry Stool, Bloody Stool, Diarrhea, Hematemesis, Rectal Bleeding and Vomiting. Musculoskeletal: Not Present- Muscle Pain and Muscle Weakness. Neurological: Not Present- Dizziness. Psychiatric: Not Present- Depression. Endocrine: Not Present- Cold Intolerance, Heat Intolerance and Thyroid Problems. Hematology: Not Present- Abnormal Bleeding, Anemia, Blood Clots and Easy Bruising.       Physical Exam   The physical exam findings are as follows:       General   Mental Status - Alert. General Appearance - Not in acute distress. Neck   Carotid Arteries - normal upstroke. No Bruits.       Chest and Lung Exam   Inspection: Accessory muscles - No use of accessory muscles in breathing. Auscultation:   Breath sounds: - Normal.      Cardiovascular   Inspection: Jugular vein - Bilateral - Inspection Normal.  Palpation/Percussion:   Apical Impulse: - Normal.  Auscultation: Rhythm - Regular. Heart Sounds - S1 WNL and S2 WNL. No S3 or S4. Murmurs & Other Heart Sounds: Auscultation of the heart reveals - No Murmurs. Abdomen   Palpation/Percussion: Palpation and Percussion of the abdomen reveal - No Palpable abdominal masses. Auscultation: Auscultation of the abdomen reveals - Bowel sounds normal.      Peripheral Vascular   Upper Extremity: Inspection - Bilateral - No Cyanotic nailbeds or Digital clubbing. Lower Extremity:   Palpation: Edema - Bilateral - No edema. Neurologic   Mental Status: Affect - normal.  Motor: - Normal. Gait - Normal.      Assessment:       ICD-10-CM ICD-9-CM    1. Mobitz type 1 second degree AV block I44.1 426.13    2. Bradycardia R00.1 427.89 AMB POC EKG ROUTINE W/ 12 LEADS, INTER & REP   3. AV block, 1st degree I44.0 426.11    4. PVC (premature ventricular contraction) I49.3 427.69        Plan:     1. Monitor reviewed. Not on any -ve chronotropics. Normal electrolytes. Clinically asymptomatic. Will get Echo today. If normal LVEF then follow clinically.

## 2017-12-05 ENCOUNTER — OFFICE VISIT (OUTPATIENT)
Dept: CARDIOLOGY CLINIC | Age: 82
End: 2017-12-05

## 2017-12-05 VITALS
HEART RATE: 60 BPM | SYSTOLIC BLOOD PRESSURE: 130 MMHG | WEIGHT: 172.3 LBS | OXYGEN SATURATION: 97 % | HEIGHT: 64 IN | DIASTOLIC BLOOD PRESSURE: 80 MMHG | RESPIRATION RATE: 16 BRPM | BODY MASS INDEX: 29.41 KG/M2

## 2017-12-05 DIAGNOSIS — I49.3 PVC (PREMATURE VENTRICULAR CONTRACTION): ICD-10-CM

## 2017-12-05 DIAGNOSIS — I44.0 AV BLOCK, 1ST DEGREE: Primary | ICD-10-CM

## 2017-12-05 DIAGNOSIS — R00.1 BRADYCARDIA: ICD-10-CM

## 2017-12-05 NOTE — PROGRESS NOTES
1. Have you been to the ER, urgent care clinic since your last visit? Hospitalized since your last visit? No    2. Have you seen or consulted any other health care providers outside of the [address] [place] [address] since your last visit? Include any pap smears or colon screening. Yes When: 11/2071 flu shot at PCP office.      Patient has no cardiac complaints his pulse oxygen reads 30 BPM.

## 2017-12-05 NOTE — MR AVS SNAPSHOT
Visit Information Date & Time Provider Department Dept. Phone Encounter #  
 12/5/2017 11:00 AM Deena Lopez, [address] [facility] [phone] [id number] Follow-up Instructions Return in about 6 months (around 6/5/2018). Upcoming Health Maintenance Date Due DTaP/Tdap/Td series (1 - Tdap) 10/25/1949 ZOSTER VACCINE AGE 60> 8/25/1988 GLAUCOMA SCREENING Q2Y 10/25/1993 Pneumococcal 65+ Low/Medium Risk (1 of 2 - PCV13) 10/25/1993 MEDICARE YEARLY EXAM 10/25/1993 Influenza Age 5 to Adult 8/1/2017 Allergies as of 12/5/2017  Review Complete On: 12/5/2017 By: Deena Lopez MD  
  
 Severity Noted Reaction Type Reactions Morphine  10/11/2010   Side Effect Nausea and Vomiting Morphine  07/09/2013   Side Effect Nausea and Vomiting Pcn [Penicillins]  10/11/2010   Side Effect Rash, Swelling Pcn [Penicillins]  07/09/2013   Systemic Rash Current Immunizations  Reviewed on 10/25/2010 No immunizations on file. Not reviewed this visit You Were Diagnosed With   
  
 Codes Comments AV block, 1st degree    -  Primary ICD-10-CM: I44.0 ICD-9-CM: 426.11 Bradycardia     ICD-10-CM: R00.1 ICD-9-CM: 427.89 PVC (premature ventricular contraction)     ICD-10-CM: I49.3 ICD-9-CM: 427.69 Vitals BP Pulse Resp Height(growth percentile) Weight(growth percentile) SpO2  
 130/80 (BP 1 Location: Right arm, BP Patient Position: Sitting) 60 16 5' 4\" (1.626 m) 172 lb 4.8 oz (78.2 kg) 97% BMI Smoking Status 29.58 kg/m2 Never Smoker Vitals History BMI and BSA Data Body Mass Index Body Surface Area  
 29.58 kg/m 2 1.88 m 2 Preferred Pharmacy Pharmacy Name Phone [phone] [facility] [phone] Your Updated Medication List  
  
   
 This list is accurate as of: 12/5/17 11:24 AM.  Always use your most recent med list.  
  
  
  
  
 COLESTID 1 gram tablet Generic drug:  colestipol Take 1 g by mouth three (3) times daily. omeprazole 20 mg capsule Commonly known as:  PRILOSEC Take 20 mg by mouth daily. SYNTHROID 100 mcg tablet Generic drug:  levothyroxine Take 100 mcg by mouth daily (before breakfast). We Performed the Following AMB POC EKG ROUTINE W/ 12 LEADS, INTER & REP [96115 CPT(R)] Follow-up Instructions Return in about 6 months (around 6/5/2018). Introducing [facility]! Evelin Johnson introduces [employer] patient portal. Now you can access parts of your medical record, email your doctor's office, and request medication refills online. 1. In your internet browser, go to [email or web address] 2. Click on the First Time User? Click Here link in the Sign In box. You will see the New Member Sign Up page. 3. Enter your [employer] Access Code exactly as it appears below. You will not need to use this code after youve completed the sign-up process. If you do not sign up before the expiration date, you must request a new code. · [employer] Access Code: [email or web address] Expires: 3/5/2018 11:22 AM 
 
4. Enter the last four digits of your Social Security Number (xxxx) and Date of Birth (mm/dd/yyyy) as indicated and click Submit. You will be taken to the next sign-up page. 5. Create a [employer] [place] ID. This will be your [employer] login ID and cannot be changed, so think of one that is secure and easy to remember. 6. Create a [employer] password. You can change your password at any time. 7. Enter your Password Reset Question and Answer. This can be used at a later time if you forget your password. 8. Enter your e-mail address. You will receive e-mail notification when new information is available in [address] 9. Click Sign Up. You can now view and download portions of your medical record. 10. Click the Download Summary menu link to download a portable copy of your medical information. If you have questions, please visit the Frequently Asked Questions section of the [employer] website. Remember, [employer] is NOT to be used for urgent needs. For medical emergencies, dial 911. Now available from your iPhone and Android! Please provide this summary of care documentation to your next provider. Your primary care clinician is listed as Trenda Benton. If you have any questions after today's visit, please call [phone].

## 2018-06-12 ENCOUNTER — OFFICE VISIT (OUTPATIENT)
Dept: CARDIOLOGY CLINIC | Age: 83
End: 2018-06-12

## 2018-06-12 VITALS
SYSTOLIC BLOOD PRESSURE: 140 MMHG | BODY MASS INDEX: 29.6 KG/M2 | WEIGHT: 173.4 LBS | RESPIRATION RATE: 16 BRPM | OXYGEN SATURATION: 98 % | DIASTOLIC BLOOD PRESSURE: 80 MMHG | HEIGHT: 64 IN | HEART RATE: 64 BPM

## 2018-06-12 DIAGNOSIS — I44.1 MOBITZ TYPE 1 SECOND DEGREE AV BLOCK: ICD-10-CM

## 2018-06-12 DIAGNOSIS — I49.3 PVC (PREMATURE VENTRICULAR CONTRACTION): ICD-10-CM

## 2018-06-12 DIAGNOSIS — R00.1 BRADYCARDIA: Primary | ICD-10-CM

## 2018-06-12 DIAGNOSIS — E78.00 HYPERCHOLESTEROLEMIA: ICD-10-CM

## 2018-06-12 RX ORDER — HYDROCORTISONE VALERATE 2 MG/G
CREAM TOPICAL
Refills: 1 | COMMUNITY
Start: 2018-05-14 | End: 2019-06-17

## 2018-06-12 RX ORDER — LORAZEPAM 0.5 MG/1
TABLET ORAL
Refills: 0 | COMMUNITY
Start: 2018-04-12 | End: 2018-06-13 | Stop reason: CLARIF

## 2018-06-12 NOTE — PROGRESS NOTES
1. Have you been to the ER, urgent care clinic since your last visit? Hospitalized since your last visit? No    2. Have you seen or consulted any other health care providers outside of the [address] [place] [address] since your last visit? Include any pap smears or colon screening.  No    Chief Complaint   Patient presents with    Slow Heart Rate     follow up    Shortness of Breath

## 2018-06-12 NOTE — PROGRESS NOTES
6/12/2018 11:48 AM      Subjective:     OLESYA West is here for f/u visit. He denies chest pain, chest pressure/discomfort, dyspnea, palpitations, irregular heart beats, near-syncope, syncope, fatigue, orthopnea, paroxysmal nocturnal dyspnea, exertional chest pressure/discomfort, claudication, lower extremity edema, tachypnea. Visit Vitals    /80    Pulse 64    Resp 16    Ht 5' 4\" (1.626 m)    Wt 173 lb 6.4 oz (78.7 kg)    SpO2 98%    BMI 29.76 kg/m2     Current Outpatient Prescriptions   Medication Sig    LORazepam (ATIVAN) 0.5 mg tablet TAKE 1 TABLET BY MOUTH 3 TIMES A DAY DAILY    colestipol (COLESTID) 1 gram tablet Take 1 g by mouth three (3) times daily.  omeprazole (PRILOSEC) 20 mg capsule Take 20 mg by mouth daily.  levothyroxine (SYNTHROID) 100 mcg tablet Take 100 mcg by mouth daily (before breakfast).  hydrocortisone valerate (WESTCORT) 0.2 % topical cream APPLY TO AFFECTED AREA DAILY AS NEEDED     No current facility-administered medications for this visit. Objective:      Visit Vitals    /80    Pulse 64    Resp 16    Ht 5' 4\" (1.626 m)    Wt 173 lb 6.4 oz (78.7 kg)    SpO2 98%    BMI 29.76 kg/m2       Data Review:     EKG: Sinus bradycardia, 1st degree AV block, PVC    Reviewed and/or ordered active problem list, medication list tests    Past Medical History:   Diagnosis Date    Abdominal pain     Dyspepsia and other specified disorders of function of stomach     GERD (gastroesophageal reflux disease)     Hypercholesterolemia     Thyroid disease       Past Surgical History:   Procedure Laterality Date    HX CATARACT REMOVAL      bilateral    HX CATARACT REMOVAL      HX CHOLECYSTECTOMY      HX GI  2010    colon surgery?     HX HERNIA REPAIR      HX OPEN CHOLECYSTECTOMY      HX ORTHOPAEDIC      ingrown left toenail-great toe    HX OTHER SURGICAL      colonoscopy    HX TONSILLECTOMY       Allergies   Allergen Reactions  Morphine Nausea and Vomiting    Morphine Nausea and Vomiting    Pcn [Penicillins] Rash and Swelling    Pcn [Penicillins] Rash      Family History   Problem Relation Age of Onset    Heart Disease Mother     Heart Disease Father       Social History     Social History    Marital status:      Spouse name: N/A    Number of children: N/A    Years of education: N/A     Occupational History    Not on file. Social History Main Topics    Smoking status: Never Smoker    Smokeless tobacco: Never Used    Alcohol use Yes      Comment: social    Drug use: Not on file    Sexual activity: Not on file     Other Topics Concern    Not on file     Social History Narrative    ** Merged History Encounter **              Review of Systems     General: Not Present- Anorexia, Chills, Dietary Changes, Fatigue, Fever, Medication Changes, Night Sweats, Weight Gain > 10lbs. and Weight Loss > 10lbs. .  Skin: Not Present- Bruising and Excessive Sweating. HEENT: Not Present- Headache, Visual Loss and Vertigo. Respiratory: Not Present- Cough, Decreased Exercise Tolerance, Difficulty Breathing, Snoring and Wheezing. Cardiovascular: Not Present- Chest Pain, Claudications, Difficulty Breathing On Exertion, Edema, Fainting / Blacking Out, Irregular Heart Beat, Night Cramps, Orthopnea, Palpitations, Paroxysmal Nocturnal Dyspnea, Rapid Heart Rate, Shortness of Breath and Swelling of Extremities. Gastrointestinal: Not Present- Black, Tarry Stool, Bloody Stool, Diarrhea, Hematemesis, Rectal Bleeding and Vomiting. Musculoskeletal: Not Present- Muscle Pain and Muscle Weakness. Neurological: Not Present- Dizziness. Psychiatric: Not Present- Depression. Endocrine: Not Present- Cold Intolerance, Heat Intolerance and Thyroid Problems. Hematology: Not Present- Abnormal Bleeding, Anemia, Blood Clots and Easy Bruising.       Physical Exam   The physical exam findings are as follows:       General   Mental Status - Alert. General Appearance - Not in acute distress. Chest and Lung Exam   Inspection: Accessory muscles - No use of accessory muscles in breathing. Auscultation:   Breath sounds: - Normal.      Cardiovascular   Inspection: Jugular vein - Bilateral - Inspection Normal.  Palpation/Percussion:   Apical Impulse: - Normal.  Auscultation: Rhythm - Regular. Heart Sounds - S1 WNL and S2 WNL. No S3 or S4. Murmurs & Other Heart Sounds: Auscultation of the heart reveals - No Murmurs. Carotid arteries - No Carotid bruit. Peripheral Vascular   Upper Extremity: Inspection - Bilateral - No Cyanotic nailbeds or Digital clubbing. Lower Extremity:   Palpation: Edema - Bilateral - No edema. Assessment:       ICD-10-CM ICD-9-CM    1. Bradycardia R00.1 427.89 LIPID PANEL      METABOLIC PANEL, COMPREHENSIVE      TSH 3RD GENERATION   2. Hypercholesterolemia E78.00 272.0 AMB POC EKG ROUTINE W/ 12 LEADS, INTER & REP      LIPID PANEL      METABOLIC PANEL, COMPREHENSIVE      TSH 3RD GENERATION   3. PVC (premature ventricular contraction) I49.3 427.69 LIPID PANEL      METABOLIC PANEL, COMPREHENSIVE      TSH 3RD GENERATION   4. Mobitz type 1 second degree AV block I44.1 426.13 LIPID PANEL      METABOLIC PANEL, COMPREHENSIVE      TSH 3RD GENERATION       Plan:     1. Sinus bradycardia, 1st degree AV block. Continue to monitor. No -ve chronotropics. Normal LVEF on last Echo.   2. Check labs.

## 2018-06-12 NOTE — MR AVS SNAPSHOT
[address] [facility] 
[phone] Patient: Roxane Clemente MRN: [id number] :10/25/1928 Visit Information Date & Time Provider Department Dept. Phone Encounter #  
 2018 11:15 AM Lucio Chisholm, 1024 [facility] [phone] [id number] Follow-up Instructions Return in about 6 months (around 2018). Routing History Follow-up and Disposition History Your Appointments 2018 10:45 AM  
ESTABLISHED PATIENT with Lucio Chisholm MD  
[facility] [address] [facility]) Appt Note: Per Dr. Ellen Taylor, 6 mo f/u, per pt request needed appt on M,T,W AM after 10:30AM-scc [id number] [facility]  
[phone] [address] [facility] Upcoming Health Maintenance Date Due DTaP/Tdap/Td series (1 - Tdap) 10/25/1949 ZOSTER VACCINE AGE 60> 1988 GLAUCOMA SCREENING Q2Y 10/25/1993 Pneumococcal 65+ Low/Medium Risk (1 of 2 - PCV13) 10/25/1993 MEDICARE YEARLY EXAM 3/14/2018 Influenza Age 5 to Adult 2018 Allergies as of 2018  Review Complete On: 2018 By: Lucio Chisholm MD  
  
 Severity Noted Reaction Type Reactions Morphine  10/11/2010   Side Effect Nausea and Vomiting Morphine  2013   Side Effect Nausea and Vomiting Pcn [Penicillins]  10/11/2010   Side Effect Rash, Swelling Pcn [Penicillins]  2013   Systemic Rash Current Immunizations  Reviewed on 10/25/2010 No immunizations on file. Not reviewed this visit You Were Diagnosed With   
  
 Codes Comments Bradycardia    -  Primary ICD-10-CM: R00.1 ICD-9-CM: 427.89 Hypercholesterolemia     ICD-10-CM: E78.00 ICD-9-CM: 272.0 PVC (premature ventricular contraction)     ICD-10-CM: I49.3 ICD-9-CM: 427.69   
 [place]on type 1 second degree AV block     ICD-10-CM: I44.1 ICD-9-CM: 426.13 Vitals BP Pulse Resp Height(growth percentile) Weight(growth percentile) SpO2  
 140/80 64 16 5' 4\" (1.626 m) 173 lb 6.4 oz (78.7 kg) 98% BMI Smoking Status 29.76 kg/m2 Never Smoker Vitals History BMI and BSA Data Body Mass Index Body Surface Area  
 29.76 kg/m 2 1.89 m 2 Preferred Pharmacy Pharmacy Name Phone [id number] [phone] [facility] [phone] Your Updated Medication List  
  
   
This list is accurate as of 6/12/18 11:54 AM.  Always use your most recent med list.  
  
  
  
  
 COLESTID 1 gram tablet Generic drug:  colestipol Take 1 g by mouth three (3) times daily. hydrocortisone valerate 0.2 % topical cream  
Commonly known as:  [address] DAILY AS NEEDED LORazepam 0.5 mg tablet Commonly known as:  ATIVAN  
TAKE 1 TABLET BY MOUTH 3 TIMES A DAY DAILY  
  
 omeprazole 20 mg capsule Commonly known as:  PRILOSEC Take 20 mg by mouth daily. SYNTHROID 100 mcg tablet Generic drug:  levothyroxine Take 100 mcg by mouth daily (before breakfast). We Performed the Following AMB POC EKG ROUTINE W/ 12 LEADS, INTER & REP [38040 CPT(R)] LIPID PANEL [60056 CPT(R)] METABOLIC PANEL, COMPREHENSIVE [55559 CPT(R)] TSH 3RD GENERATION [29738 CPT(R)] Follow-up Instructions Return in about 6 months (around 12/12/2018). Introducing [facility]! [employer] introduces [employer] patient portal. Now you can access parts of your medical record, email your doctor's office, and request medication refills online. 1. In your internet browser, go to [email or web address] 2. Click on the First Time User? Click Here link in the Sign In box. You will see the New Member Sign Up page. 3. Enter your Playfire Access Code exactly as it appears below. You will not need to use this code after youve completed the sign-up process. If you do not sign up before the expiration date, you must request a new code. · Playfire Access Code: [email or web address] Expires: 9/10/2018 11:54 AM 
 
4. Enter the last four digits of your Social Security Number (xxxx) and Date of Birth (mm/dd/yyyy) as indicated and click Submit. You will be taken to the next sign-up page. 5. Create a Playfire ID. This will be your Playfire login ID and cannot be changed, so think of one that is secure and easy to remember. 6. Create a Playfire password. You can change your password at any time. 7. Enter your Password Reset Question and Answer. This can be used at a later time if you forget your password. 8. Enter your e-mail address. You will receive e-mail notification when new information is available in [address] 9. Click Sign Up. You can now view and download portions of your medical record. 10. Click the Download Summary menu link to download a portable copy of your medical information. If you have questions, please visit the Frequently Asked Questions section of the Play[employer] website. Remember, [employer] is NOT to be used for urgent needs. For medical emergencies, dial 911. Now available from your iPhone and Android! Please provide this summary of care documentation to your next provider. Your primary care clinician is listed as John Hannon. If you have any questions after today's visit, please call [phone].

## 2018-06-13 ENCOUNTER — TELEPHONE (OUTPATIENT)
Dept: CARDIOLOGY CLINIC | Age: 83
End: 2018-06-13

## 2018-06-13 ENCOUNTER — HOSPITAL ENCOUNTER (OUTPATIENT)
Dept: LAB | Age: 83
Discharge: HOME | End: 2018-06-13
Payer: MEDICARE

## 2018-06-13 PROCEDURE — 80061 LIPID PANEL: CPT

## 2018-06-13 PROCEDURE — 84443 ASSAY THYROID STIM HORMONE: CPT

## 2018-06-13 PROCEDURE — 36415 COLL VENOUS BLD VENIPUNCTURE: CPT

## 2018-06-13 PROCEDURE — 80053 COMPREHEN METABOLIC PANEL: CPT

## 2018-06-13 NOTE — TELEPHONE ENCOUNTER
Pt's said while looking at his med rec sheet on AVS he noticed that his wife's ativan script was listed as one of his own and he wanted to let us know so we could remove it.   Thanks, Amy Shah

## 2018-06-14 LAB
ALBUMIN SERPL-MCNC: 4.5 G/DL (ref 3.5–4.7)
ALBUMIN/GLOB SERPL: 2.3 {RATIO} (ref 1.2–2.2)
ALP SERPL-CCNC: 95 IU/L (ref 39–117)
ALT SERPL-CCNC: 14 IU/L (ref 0–44)
AST SERPL-CCNC: 22 IU/L (ref 0–40)
BILIRUB SERPL-MCNC: 1 MG/DL (ref 0–1.2)
BUN SERPL-MCNC: 18 MG/DL (ref 8–27)
BUN/CREAT SERPL: 13 (ref 10–24)
CALCIUM SERPL-MCNC: 9.2 MG/DL (ref 8.6–10.2)
CHLORIDE SERPL-SCNC: 102 MMOL/L (ref 96–106)
CHOLEST SERPL-MCNC: 194 MG/DL (ref 100–199)
CO2 SERPL-SCNC: 25 MMOL/L (ref 20–29)
CREAT SERPL-MCNC: 1.4 MG/DL (ref 0.76–1.27)
GFR SERPLBLD CREATININE-BSD FMLA CKD-EPI: 44 ML/MIN/1.73
GFR SERPLBLD CREATININE-BSD FMLA CKD-EPI: 51 ML/MIN/1.73
GLOBULIN SER CALC-MCNC: 2 G/DL (ref 1.5–4.5)
GLUCOSE SERPL-MCNC: 107 MG/DL (ref 65–99)
HDLC SERPL-MCNC: 51 MG/DL
INTERPRETATION, 910389: NORMAL
INTERPRETATION: NORMAL
LDLC SERPL CALC-MCNC: 114 MG/DL (ref 0–99)
PDF IMAGE, 910387: NORMAL
POTASSIUM SERPL-SCNC: 4.8 MMOL/L (ref 3.5–5.2)
PROT SERPL-MCNC: 6.5 G/DL (ref 6–8.5)
SODIUM SERPL-SCNC: 140 MMOL/L (ref 134–144)
TRIGL SERPL-MCNC: 143 MG/DL (ref 0–149)
TSH SERPL DL<=0.005 MIU/L-ACNC: 1.64 UIU/ML (ref 0.45–4.5)
VLDLC SERPL CALC-MCNC: 29 MG/DL (ref 5–40)

## 2018-06-18 ENCOUNTER — TELEPHONE (OUTPATIENT)
Dept: CARDIOLOGY CLINIC | Age: 83
End: 2018-06-18

## 2018-06-18 NOTE — TELEPHONE ENCOUNTER
----- Message from Anusha Golden MD sent at 6/14/2018  3:22 PM EDT -----  Inform him labs are k       Called pt,verified pt with two pt identifiers, told pt that  reviewed recent labs and they look okay. Went over the TSH and Lipid with pt. Pt verbalized understanding.

## 2018-12-04 ENCOUNTER — OFFICE VISIT (OUTPATIENT)
Dept: CARDIOLOGY CLINIC | Age: 83
End: 2018-12-04

## 2018-12-04 VITALS
DIASTOLIC BLOOD PRESSURE: 70 MMHG | OXYGEN SATURATION: 98 % | BODY MASS INDEX: 28.95 KG/M2 | WEIGHT: 169.6 LBS | HEART RATE: 54 BPM | SYSTOLIC BLOOD PRESSURE: 120 MMHG | HEIGHT: 64 IN

## 2018-12-04 DIAGNOSIS — R00.1 BRADYCARDIA: Primary | ICD-10-CM

## 2018-12-04 DIAGNOSIS — I49.3 PVC (PREMATURE VENTRICULAR CONTRACTION): ICD-10-CM

## 2018-12-04 DIAGNOSIS — I44.0 AV BLOCK, 1ST DEGREE: ICD-10-CM

## 2018-12-04 DIAGNOSIS — I44.1 MOBITZ TYPE 1 SECOND DEGREE AV BLOCK: ICD-10-CM

## 2018-12-04 RX ORDER — DOXYCYCLINE 100 MG/1
CAPSULE ORAL
Refills: 1 | COMMUNITY
Start: 2018-11-21 | End: 2019-06-17 | Stop reason: ALTCHOICE

## 2018-12-04 RX ORDER — CHLORHEXIDINE GLUCONATE 1.2 MG/ML
RINSE ORAL
Refills: 1 | COMMUNITY
Start: 2018-11-21 | End: 2019-06-17

## 2018-12-04 RX ORDER — CLINDAMYCIN HYDROCHLORIDE 150 MG/1
CAPSULE ORAL
Refills: 1 | COMMUNITY
Start: 2018-11-19 | End: 2019-06-17 | Stop reason: ALTCHOICE

## 2018-12-04 NOTE — PROGRESS NOTES
12/4/2018 11:34 AM 
 
 
Subjective:  
 
OLESYA Osmane Elder is here for f/u visit. Doing very well from CV standpoint. He denies chest pain, chest pressure/discomfort, dyspnea, palpitations, irregular heart beats, near-syncope, syncope, fatigue, orthopnea, paroxysmal nocturnal dyspnea, exertional chest pressure/discomfort, claudication, lower extremity edema, tachypnea. Visit Vitals /70 (BP 1 Location: Right arm, BP Patient Position: Sitting) Pulse (!) 54 Ht 5' 4\" (1.626 m) Wt 169 lb 9.6 oz (76.9 kg) SpO2 98% BMI 29.11 kg/m² Current Outpatient Medications Medication Sig  colestipol (COLESTID) 1 gram tablet Take 1 g by mouth daily.  omeprazole (PRILOSEC) 20 mg capsule Take 20 mg by mouth daily.  levothyroxine (SYNTHROID) 100 mcg tablet Take 100 mcg by mouth daily (before breakfast).  chlorhexidine (PERIDEX) 0.12 % solution RINSE WITH 1/2 OZ TWICE DAILY FOR 30 SECONDS, THEN SPIT.  clindamycin (CLEOCIN) 150 mg capsule TAKE 1 CAPSULE BY MOUTH FOUR TIMES A DAY  doxycycline (VIBRAMYCIN) 100 mg capsule TAKE ONE CAPSULE BY MOUTH EVERY 12 HOURS  hydrocortisone valerate (WESTCORT) 0.2 % topical cream APPLY TO AFFECTED AREA DAILY AS NEEDED No current facility-administered medications for this visit. Objective:  
  
Visit Vitals /70 (BP 1 Location: Right arm, BP Patient Position: Sitting) Pulse (!) 54 Ht 5' 4\" (1.626 m) Wt 169 lb 9.6 oz (76.9 kg) SpO2 98% BMI 29.11 kg/m² Data Review:  
 
EKG: Normal sinus rhythm, septal infarct, 1st degree AV block, PVCs. unchanged Reviewed and/or ordered active problem list, medication list tests Past Medical History:  
Diagnosis Date  Abdominal pain  Dyspepsia and other specified disorders of function of stomach  GERD (gastroesophageal reflux disease)  Hypercholesterolemia  Thyroid disease Past Surgical History:  
Procedure Laterality Date  HX CATARACT REMOVAL    
 bilateral  
 HX CATARACT REMOVAL    
 HX CHOLECYSTECTOMY  HX GI  2010  
 colon surgery?  HX HERNIA REPAIR    
 HX OPEN CHOLECYSTECTOMY  HX ORTHOPAEDIC    
 ingrown left toenail-great toe  HX OTHER SURGICAL    
 colonoscopy  HX TONSILLECTOMY Allergies Allergen Reactions  Morphine Nausea and Vomiting  Morphine Nausea and Vomiting  Pcn [Penicillins] Rash and Swelling  Pcn [Penicillins] Rash Family History Problem Relation Age of Onset  Heart Disease Mother  Heart Disease Father Social History Socioeconomic History  Marital status:  Spouse name: Not on file  Number of children: Not on file  Years of education: Not on file  Highest education level: Not on file Social Needs  Financial resource strain: Not on file  Food insecurity - worry: Not on file  Food insecurity - inability: Not on file  Transportation needs - medical: Not on file  Transportation needs - non-medical: Not on file Occupational History  Not on file Tobacco Use  Smoking status: Never Smoker  Smokeless tobacco: Never Used Substance and Sexual Activity  Alcohol use: Yes Comment: social  
 Drug use: Unknown Types: Prescription  Sexual activity: Not on file Other Topics Concern  Not on file Social History Narrative ** Merged History Encounter ** Review of Systems  
General: Not Present- Dietary Changes, Fatigue and Medication Changes. Skin: Not Present- Bruising and Excessive Sweating. HEENT: Not Present- Headache, Visual Loss and Vertigo. Respiratory: Not Present- Cough, Decreased Exercise Tolerance and Wheezing.  
Cardiovascular: Not Present- Abnormal Blood Pressure, Chest Pain, Claudications, Difficulty Breathing Lying Down, Difficulty Breathing On Exertion, Edema, Fainting / Blacking Out, Irregular Heart Beat, Palpitations, Paroxysmal Nocturnal Dyspnea, Rapid Heart Rate and Shortness of Breath. Gastrointestinal: Not Present- Black, Tarry Stool, Bloody Stool, Diarrhea, Hematemesis, Rectal Bleeding and Vomiting. Musculoskeletal: Not Present- Muscle Pain and Muscle Weakness. Neurological: Not Present- Dizziness. Psychiatric: Not Present- Depression. Endocrine: Not Present- Cold Intolerance, Heat Intolerance and Thyroid Problems. Hematology: Not Present- Abnormal Bleeding, Anemia, Blood Clots and Easy Bruising. 
 
  
Physical Exam  
The physical exam findings are as follows: 
 
  
General  
Mental Status - Alert. General Appearance - Not in acute distress. Neck  
Carotid Arteries - normal upstroke. No Bruits. Chest and Lung Exam  Inspection: Accessory muscles - No use of accessory muscles in breathing. Auscultation:  
Breath sounds: - Normal. 
 
 
Cardiovascular  
Inspection: Jugular vein - Bilateral - Inspection Normal. 
Palpation/Percussion:  
Apical Impulse: - Normal. 
Auscultation: Rhythm - Regular. Heart Sounds - S1 WNL and S2 WNL. No S3 or S4. Murmurs & Other Heart Sounds: Auscultation of the heart reveals - No Murmurs. Abdomen  
Palpation/Percussion: Palpation and Percussion of the abdomen reveal - No Palpable abdominal masses. Auscultation: Auscultation of the abdomen reveals - Bowel sounds normal. 
 
 
Peripheral Vascular  
Upper Extremity: Inspection - Bilateral - No Cyanotic nailbeds or Digital clubbing. Lower Extremity:  
Palpation: Edema - Bilateral - No edema. Normal b/l PT. Neurologic  
Mental Status: Affect - normal. 
Motor: - Normal. Gait - Normal. 
 
 
Assessment: ICD-10-CM ICD-9-CM 1. Bradycardia R00.1 427.89 AMB POC EKG ROUTINE W/ 12 LEADS, INTER & REP 2. AV block, 1st degree I44.0 426.11   
3. Mobitz type 1 second degree AV block I44.1 426.13   
4. PVC (premature ventricular contraction) I49.3 427.69 Plan: 1. Sinus bradycardia, 1st degree AV block. Continue to monitor. Avoid -ve chronotropics. Normal LVEF on last Echo.  
2. Last labs noted.

## 2018-12-04 NOTE — PROGRESS NOTES
Chief Complaint Patient presents with  Cholesterol Problem 6 MO. F/U  Ankle swelling C/O ANKLE SWELLING SOMETIMES IN THE EVENING 1. Have you been to the ER, urgent care clinic since your last visit? Hospitalized since your last visit? NO 
 
2. Have you seen or consulted any other health care providers outside of the [facility] since your last visit? Include any pap smears or colon screening. YES, HIS PCP.

## 2019-06-17 ENCOUNTER — HOSPITAL ENCOUNTER (OUTPATIENT)
Dept: LAB | Age: 84
Discharge: HOME | End: 2019-06-17
Payer: MEDICARE

## 2019-06-17 ENCOUNTER — TELEPHONE (OUTPATIENT)
Dept: CARDIOLOGY CLINIC | Age: 84
End: 2019-06-17

## 2019-06-17 ENCOUNTER — OFFICE VISIT (OUTPATIENT)
Dept: CARDIOLOGY CLINIC | Age: 84
End: 2019-06-17

## 2019-06-17 VITALS
HEART RATE: 68 BPM | SYSTOLIC BLOOD PRESSURE: 184 MMHG | HEIGHT: 64 IN | RESPIRATION RATE: 16 BRPM | BODY MASS INDEX: 29.67 KG/M2 | DIASTOLIC BLOOD PRESSURE: 82 MMHG | OXYGEN SATURATION: 94 % | WEIGHT: 173.8 LBS

## 2019-06-17 DIAGNOSIS — I44.0 AV BLOCK, 1ST DEGREE: Primary | ICD-10-CM

## 2019-06-17 DIAGNOSIS — R42 DIZZINESS AFTER EXTENSION OF NECK: ICD-10-CM

## 2019-06-17 DIAGNOSIS — I10 ESSENTIAL HYPERTENSION: ICD-10-CM

## 2019-06-17 DIAGNOSIS — E78.00 HYPERCHOLESTEROLEMIA: ICD-10-CM

## 2019-06-17 DIAGNOSIS — R00.1 BRADYCARDIA: ICD-10-CM

## 2019-06-17 PROCEDURE — 36415 COLL VENOUS BLD VENIPUNCTURE: CPT

## 2019-06-17 PROCEDURE — 80048 BASIC METABOLIC PNL TOTAL CA: CPT

## 2019-06-17 NOTE — PROGRESS NOTES
Chief Complaint   Patient presents with    Dizziness     6 month follow up, C/O lightheadedness and swelling in ankles/feet     1. Have you been to the ER, urgent care clinic since your last visit? Hospitalized since your last visit? NO    2. Have you seen or consulted any other health care providers outside of the [address] [place] [address] since your last visit? Include any pap smears or colon screening.  NO

## 2019-06-17 NOTE — TELEPHONE ENCOUNTER
Patient was seen in office today and was told to start a new medication. Patient called pharmacy no prescriptions have been sent over.     Please send to [facility].    Thanks  Smiley Blanco

## 2019-06-17 NOTE — TELEPHONE ENCOUNTER
Pt returned call,verified pt with two pt with two pt identifiers, told pt that we are not starting the medication until we get the lab work back. We need to check his kidney function first. Advised pt to wait on us to call him and advise when the script is sent to pharmacy. Pt verbalized understanding.

## 2019-06-17 NOTE — TELEPHONE ENCOUNTER
Returned call, spoke to granddaughter Miky Ramos but she is not on the HPI form. Advised to have pt call me later.

## 2019-06-17 NOTE — PROGRESS NOTES
Neda Duong, [facility]    Subjective/HPI:     Mr. Cherrie Gibson is a 80 y.o. male is here for routine f/u. He has a PMHx of sinus bradycardia with 1st degree AV block, HLD and GERD. He notes new lower extremity edema for a few weeks. He notes edema is persistent throughout the day. He uses compression stockings daily and does not find much improvement in edema with this. He also notes light-headedness that has worsened. Symptoms have been chronic, but he reports worsening symptoms with turning his neck side to side. He is under a lot of stress as his wife is in the late stages of Alzheimer's and is in hospice care, expected to pass soon. He denies complaints of shortness of breath or chest pain symptoms with exertion. He denies orthopnea or PND. He denies palpitations. He denies syncopal episodes. PCP Provider  Isela Abarca MD  Past Medical History:   Diagnosis Date    Abdominal pain     Dyspepsia and other specified disorders of function of stomach     GERD (gastroesophageal reflux disease)     Hypercholesterolemia     Thyroid disease       Past Surgical History:   Procedure Laterality Date    HX CATARACT REMOVAL      bilateral    HX CATARACT REMOVAL      HX CHOLECYSTECTOMY      HX GI  2010    colon surgery?     HX HERNIA REPAIR      HX OPEN CHOLECYSTECTOMY      HX ORTHOPAEDIC      ingrown left toenail-great toe    HX OTHER SURGICAL      colonoscopy    HX TONSILLECTOMY       Family History   Problem Relation Age of Onset    Heart Disease Mother     Heart Disease Father      Social History     Socioeconomic History    Marital status:      Spouse name: Not on file    Number of children: Not on file    Years of education: Not on file    Highest education level: Not on file   Occupational History    Not on file   Social Needs    Financial resource strain: Not on file    Food insecurity:     Worry: Not on file     Inability: Not on file   Lotus Tissue Repair needs: Medical: Not on file     Non-medical: Not on file   Tobacco Use    Smoking status: Never Smoker    Smokeless tobacco: Never Used   Substance and Sexual Activity    Alcohol use: Yes     Comment: social    Drug use: Unknown     Types: Prescription    Sexual activity: Not on file   Lifestyle    Physical activity:     Days per week: Not on file     Minutes per session: Not on file    Stress: Not on file   Relationships    Social connections:     Talks on phone: Not on file     Gets together: Not on file     Attends Yazidi service: Not on file     Active member of club or organization: Not on file     Attends meetings of clubs or organizations: Not on file     Relationship status: Not on file    Intimate partner violence:     Fear of current or ex partner: Not on file     Emotionally abused: Not on file     Physically abused: Not on file     Forced sexual activity: Not on file   Other Topics Concern    Not on file   Social History Narrative    ** Merged History Encounter **            Allergies   Allergen Reactions    Morphine Nausea and Vomiting    Morphine Nausea and Vomiting    Pcn [Penicillins] Rash and Swelling    Pcn [Penicillins] Rash        Current Outpatient Medications   Medication Sig    colestipol (COLESTID) 1 gram tablet Take 1 g by mouth daily.  omeprazole (PRILOSEC) 20 mg capsule Take 20 mg by mouth daily.  levothyroxine (SYNTHROID) 100 mcg tablet Take 100 mcg by mouth daily (before breakfast). No current facility-administered medications for this visit. I have reviewed the problem list, allergy list, medical history, family, social history and medications. Review of Symptoms:    Review of Systems   Constitutional: Negative for chills, fever and weight loss. HENT: Negative for nosebleeds. Eyes: Negative for blurred vision and double vision. Respiratory: Negative for cough, shortness of breath and wheezing.     Cardiovascular: Positive for leg swelling. Negative for chest pain, palpitations, orthopnea and PND. Gastrointestinal: Negative for abdominal pain, blood in stool, diarrhea, nausea and vomiting. Musculoskeletal: Negative for joint pain. Skin: Negative for rash. Neurological: Positive for dizziness. Negative for tingling and loss of consciousness. Endo/Heme/Allergies: Does not bruise/bleed easily. Physical Exam:      General: Well developed, in no acute distress, cooperative and alert  HEENT: No carotid bruits, no JVD, trach is midline. Neck Supple, PEERL, EOM intact. Heart:  reg rate and rhythm; normal S1/S2; no murmurs, gallops or rubs. Respiratory: Clear bilaterally x 4, no wheezing or rales  Abdomen:   Soft, non-tender, no distention, no masses. + BS. Extremities:  Normal cap refill, no cyanosis, atraumatic. Bilateral 3+ pitting edema  Neuro: A&Ox3, speech clear, gait stable. Skin: Skin color is normal. No rashes or lesions.  Non diaphoretic  Vascular: 2+ pulses symmetric in all extremities    Vitals:    06/17/19 1014 06/17/19 1028 06/17/19 1031   BP: 180/80 190/88 184/82   Pulse: 68     Resp: 16     SpO2: 94%     Weight: 173 lb 12.8 oz (78.8 kg)     Height: 5' 4\" (1.626 m)         Cardiographics    ECG: sinus bradycardia; 1st degree AV block, RBBB  Results for orders placed or performed during the hospital encounter of 06/04/17   EKG, 12 LEAD, INITIAL   Result Value Ref Range    Ventricular Rate 69 BPM    Atrial Rate 69 BPM    P-R Interval 356 ms    QRS Duration 92 ms    Q-T Interval 416 ms    QTC Calculation (Bezet) 445 ms    Calculated P Axis 33 degrees    Calculated R Axis 44 degrees    Calculated T Axis -3 degrees    Diagnosis       Sinus rhythm with 1st degree AV block with frequent premature ventricular   complexes in a pattern of bigeminy    Confirmed by Rian Lainez MD, Nida Blakely ([id number]) on 6/5/2017 11:58:03 AM         Cardiology Labs:  Lab Results   Component Value Date/Time    Cholesterol, total 194 06/13/2018 03:27 PM HDL Cholesterol 51 06/13/2018 03:27 PM    LDL, calculated 114 (H) 06/13/2018 03:27 PM    Triglyceride 143 06/13/2018 03:27 PM       Lab Results   Component Value Date/Time    Sodium 140 06/13/2018 03:27 PM    Potassium 4.8 06/13/2018 03:27 PM    Chloride 102 06/13/2018 03:27 PM    CO2 25 06/13/2018 03:27 PM    Anion gap 5 06/04/2017 03:06 PM    Glucose 107 (H) 06/13/2018 03:27 PM    BUN 18 06/13/2018 03:27 PM    Creatinine 1.40 (H) 06/13/2018 03:27 PM    BUN/Creatinine ratio 13 06/13/2018 03:27 PM    GFR est AA 51 (L) 06/13/2018 03:27 PM    GFR est non-AA 44 (L) 06/13/2018 03:27 PM    Calcium 9.2 06/13/2018 03:27 PM    Bilirubin, total 1.0 06/13/2018 03:27 PM    AST (SGOT) 22 06/13/2018 03:27 PM    Alk. phosphatase 95 06/13/2018 03:27 PM    Protein, total 6.5 06/13/2018 03:27 PM    Albumin 4.5 06/13/2018 03:27 PM    Globulin 3.0 06/04/2017 03:06 PM    A-G Ratio 2.3 (H) 06/13/2018 03:27 PM    ALT (SGPT) 14 06/13/2018 03:27 PM           Assessment:     Assessment:       ICD-10-CM ICD-9-CM    1. AV block, 1st degree I44.0 426.11    2. Bradycardia R00.1 427.89    3. Essential hypertension I10 401.9    4. Hypercholesterolemia E78.00 272.0 AMB POC EKG ROUTINE W/ 12 LEADS, INTER & REP        Plan:     1. AV block, 1st degree  Stable; continue to avoid AV victorina meds. 2. Bradycardia  Stable; asymptomatic    3. Essential hypertension  Elevated BP in office today, with repeat checks. Will obtain BMP today to evaluate renal function. If at his baseline, will start low dose HCTZ 12.5 mg daily and plan to repeat BMP again in 2 weeks. Check echo. F/u in 1 month to assess response. 4. Hypercholesterolemia  Continue statin therapy    5. Dizziness with light-headedness  New symptoms worse from baseline. Possibly vestibular, but will check carotid duplex. F/u in 1 month for BP check; 6 months with Dr. Aureliano Ferris.     Denzel Currie, [facility]       Patient seen and examined by me with nurse practitioner. Elyse Peres personally performed all components of the history, physical, and medical decision making and agree with the assessment and plan with minor modifications as noted.     Jia Dunn MD

## 2019-06-18 DIAGNOSIS — I10 ESSENTIAL HYPERTENSION: Primary | ICD-10-CM

## 2019-06-18 LAB
BUN SERPL-MCNC: 19 MG/DL (ref 10–36)
BUN/CREAT SERPL: 15 (ref 10–24)
CALCIUM SERPL-MCNC: 9.3 MG/DL (ref 8.6–10.2)
CHLORIDE SERPL-SCNC: 103 MMOL/L (ref 96–106)
CO2 SERPL-SCNC: 25 MMOL/L (ref 20–29)
CREAT SERPL-MCNC: 1.27 MG/DL (ref 0.76–1.27)
GLUCOSE SERPL-MCNC: 92 MG/DL (ref 65–99)
INTERPRETATION: NORMAL
POTASSIUM SERPL-SCNC: 5.2 MMOL/L (ref 3.5–5.2)
SODIUM SERPL-SCNC: 142 MMOL/L (ref 134–144)

## 2019-06-18 RX ORDER — HYDROCHLOROTHIAZIDE 12.5 MG/1
12.5 TABLET ORAL DAILY
Qty: 30 TAB | Refills: 1 | Status: SHIPPED | OUTPATIENT
Start: 2019-06-18 | End: 2019-08-10 | Stop reason: SDUPTHER

## 2019-06-18 NOTE — PROGRESS NOTES
Gifty,    Please call the patient and let him know his kidney function is actually doing pretty well, so let's go ahead and start the low dose HCTZ 12.5 mg daily and repeat the labwork in 2 weeks. Make sure he comes back in 1 month to see me to check the swelling in his legs and check his blood pressure. Medication e-scribed to pharmacy.     Thanks,  [employer]

## 2019-06-19 ENCOUNTER — TELEPHONE (OUTPATIENT)
Dept: CARDIOLOGY CLINIC | Age: 84
End: 2019-06-19

## 2019-06-19 DIAGNOSIS — I44.0 AV BLOCK, 1ST DEGREE: Primary | ICD-10-CM

## 2019-06-19 NOTE — TELEPHONE ENCOUNTER
----- Message from Violeta Ruelas NP sent at 6/18/2019  9:03 AM EDT -----  Hattie Rincon,    Please call the patient and let him know his kidney function is actually doing pretty well, so let's go ahead and start the low dose HCTZ 12.5 mg daily and repeat the labwork in 2 weeks. Make sure he comes back in 1 month to see me to check the swelling in his legs and check his blood pressure. Medication e-scribed to pharmacy.     Thanks,  [employer]

## 2019-06-20 NOTE — TELEPHONE ENCOUNTER
Patient returning your call but he will be unavailable to phone so he will call you back.     Thanks  Mack Watts

## 2019-06-21 NOTE — TELEPHONE ENCOUNTER
Verified patient with 2 identifiers   Patient started the HCTZ 2 days ago. Advised that he needs to have Labs rechecked in 2 weeks - will order labs per Gianluca Robles NP and leave slip up front and patient will  next week when he comes in for testing. Already has follow up scheduled.

## 2019-07-17 DIAGNOSIS — I10 ESSENTIAL HYPERTENSION: ICD-10-CM

## 2019-07-18 ENCOUNTER — OFFICE VISIT (OUTPATIENT)
Dept: CARDIOLOGY CLINIC | Age: 84
End: 2019-07-18

## 2019-07-18 VITALS
HEART RATE: 51 BPM | BODY MASS INDEX: 29.88 KG/M2 | OXYGEN SATURATION: 95 % | WEIGHT: 175 LBS | RESPIRATION RATE: 18 BRPM | DIASTOLIC BLOOD PRESSURE: 64 MMHG | HEIGHT: 64 IN | SYSTOLIC BLOOD PRESSURE: 120 MMHG

## 2019-07-18 DIAGNOSIS — I65.23 BILATERAL CAROTID ARTERY STENOSIS: ICD-10-CM

## 2019-07-18 DIAGNOSIS — I10 ESSENTIAL HYPERTENSION: Primary | ICD-10-CM

## 2019-07-18 DIAGNOSIS — E78.00 HYPERCHOLESTEROLEMIA: ICD-10-CM

## 2019-07-18 DIAGNOSIS — I44.0 AV BLOCK, 1ST DEGREE: ICD-10-CM

## 2019-07-18 RX ORDER — HYDROCORTISONE VALERATE 2 MG/G
CREAM TOPICAL
Refills: 1 | Status: ON HOLD | COMMUNITY
Start: 2019-04-11 | End: 2020-07-30

## 2019-07-18 NOTE — PROGRESS NOTES
Kiera Garcia, [facility]    Subjective/HPI:     Mr. Catalina Coughlin is a 80 y.o. male is here for routine f/u. He has a PMHx of sinus bradycardia with 1st degree AV block, HLD and GERD. At last visit, we started HCTZ 25 mg daily for worsening lower extremity edema with HTN and obtained carotid duplex for dizziness symptoms. Carotid duplex with moderate bilateral ICA stenosis. He notes dizziness symptoms remain, but have not worsened since starting HCTZ  He notes improved edema with HCTZ, and blood pressures have improved. PCP Provider  Lala Aguilar MD  Past Medical History:   Diagnosis Date    Abdominal pain     Dyspepsia and other specified disorders of function of stomach     GERD (gastroesophageal reflux disease)     Hypercholesterolemia     Thyroid disease       Past Surgical History:   Procedure Laterality Date    HX CATARACT REMOVAL      bilateral    HX CATARACT REMOVAL      HX CHOLECYSTECTOMY      HX GI  2010    colon surgery?     HX HERNIA REPAIR      HX OPEN CHOLECYSTECTOMY      HX ORTHOPAEDIC      ingrown left toenail-great toe    HX OTHER SURGICAL      colonoscopy    HX TONSILLECTOMY       Family History   Problem Relation Age of Onset    Heart Disease Mother     Heart Disease Father      Social History     Socioeconomic History    Marital status:      Spouse name: Not on file    Number of children: Not on file    Years of education: Not on file    Highest education level: Not on file   Occupational History    Not on file   Social Needs    Financial resource strain: Not on file    Food insecurity:     Worry: Not on file     Inability: Not on file    Transportation needs:     Medical: Not on file     Non-medical: Not on file   Tobacco Use    Smoking status: Never Smoker    Smokeless tobacco: Never Used   Substance and Sexual Activity    Alcohol use: Yes     Comment: social    Drug use: Unknown     Types: Prescription    Sexual activity: Not on file Lifestyle    Physical activity:     Days per week: Not on file     Minutes per session: Not on file    Stress: Not on file   Relationships    Social connections:     Talks on phone: Not on file     Gets together: Not on file     Attends Bahai service: Not on file     Active member of club or organization: Not on file     Attends meetings of clubs or organizations: Not on file     Relationship status: Not on file    Intimate partner violence:     Fear of current or ex partner: Not on file     Emotionally abused: Not on file     Physically abused: Not on file     Forced sexual activity: Not on file   Other Topics Concern    Not on file   Social History Narrative    ** Merged History Encounter **            Allergies   Allergen Reactions    Morphine Nausea and Vomiting    Morphine Nausea and Vomiting    Pcn [Penicillins] Rash and Swelling    Pcn [Penicillins] Rash        Current Outpatient Medications   Medication Sig    hydrocortisone valerate (WESTCORT) 0.2 % topical cream APPLY TO AFFECTED AREA DAILY AS NEEDED    hydroCHLOROthiazide (HYDRODIURIL) 12.5 mg tablet Take 1 Tab by mouth daily.  colestipol (COLESTID) 1 gram tablet Take 1 g by mouth daily.  omeprazole (PRILOSEC) 20 mg capsule Take 20 mg by mouth daily.  levothyroxine (SYNTHROID) 100 mcg tablet Take 100 mcg by mouth daily (before breakfast). No current facility-administered medications for this visit. I have reviewed the problem list, allergy list, medical history, family, social history and medications. Review of Symptoms:    Review of Systems   Constitutional: Negative for chills, fever and weight loss. HENT: Negative for nosebleeds. Eyes: Negative for blurred vision and double vision. Respiratory: Negative for cough, shortness of breath and wheezing. Cardiovascular: Positive for leg swelling. Negative for chest pain, palpitations, orthopnea and PND.    Gastrointestinal: Negative for abdominal pain, blood in stool, diarrhea, nausea and vomiting. Musculoskeletal: Negative for joint pain. Skin: Negative for rash. Neurological: Positive for dizziness. Negative for tingling and loss of consciousness. Endo/Heme/Allergies: Does not bruise/bleed easily. Physical Exam:      General: Well developed, in no acute distress, cooperative and alert  HEENT: No carotid bruits, no JVD, trach is midline. Neck Supple, PEERL, EOM intact. Heart:  reg rate and rhythm; normal S1/S2; no murmurs, gallops or rubs. Respiratory: Clear bilaterally x 4, no wheezing or rales  Abdomen:   Soft, non-tender, no distention, no masses. + BS. Extremities:  Normal cap refill, no cyanosis, atraumatic. Bilateral 2+ non-pitting edema  Neuro: A&Ox3, speech clear, gait stable. Skin: Skin color is normal. No rashes or lesions.  Non diaphoretic  Vascular: 2+ pulses symmetric in all extremities    Vitals:    07/18/19 1316 07/18/19 1324 07/18/19 1325   BP: 130/70 118/64 120/64   Pulse: (!) 51     Resp: 18     SpO2: 96% 95%    Weight: 175 lb (79.4 kg)     Height: 5' 4\" (1.626 m)         Cardiographics    Results for orders placed or performed during the hospital encounter of 06/04/17   EKG, 12 LEAD, INITIAL   Result Value Ref Range    Ventricular Rate 69 BPM    Atrial Rate 69 BPM    P-R Interval 356 ms    QRS Duration 92 ms    Q-T Interval 416 ms    QTC Calculation (Bezet) 445 ms    Calculated P Axis 33 degrees    Calculated R Axis 44 degrees    Calculated T Axis -3 degrees    Diagnosis       Sinus rhythm with 1st degree AV block with frequent premature ventricular   complexes in a pattern of bigeminy    Confirmed by Richie Taveras MD, [facility] ([id number]) on 6/5/2017 11:58:03 AM         Cardiology Labs:  Lab Results   Component Value Date/Time    Cholesterol, total 194 06/13/2018 03:27 PM    HDL Cholesterol 51 06/13/2018 03:27 PM    LDL, calculated 114 (H) 06/13/2018 03:27 PM    Triglyceride 143 06/13/2018 03:27 PM       Lab Results   Component Value Date/Time    Sodium 142 06/17/2019 03:51 PM    Potassium 5.2 06/17/2019 03:51 PM    Chloride 103 06/17/2019 03:51 PM    CO2 25 06/17/2019 03:51 PM    Anion gap 5 06/04/2017 03:06 PM    Glucose 92 06/17/2019 03:51 PM    BUN 19 06/17/2019 03:51 PM    Creatinine 1.27 06/17/2019 03:51 PM    BUN/Creatinine ratio 15 06/17/2019 03:51 PM    GFR est AA 57 (L) 06/17/2019 03:51 PM    GFR est non-AA 49 (L) 06/17/2019 03:51 PM    Calcium 9.3 06/17/2019 03:51 PM    Bilirubin, total 1.0 06/13/2018 03:27 PM    AST (SGOT) 22 06/13/2018 03:27 PM    Alk. phosphatase 95 06/13/2018 03:27 PM    Protein, total 6.5 06/13/2018 03:27 PM    Albumin 4.5 06/13/2018 03:27 PM    Globulin 3.0 06/04/2017 03:06 PM    A-G Ratio 2.3 (H) 06/13/2018 03:27 PM    ALT (SGPT) 14 06/13/2018 03:27 PM           Assessment:     Assessment:       ICD-10-CM ICD-9-CM    1. Essential hypertension I10 401.9    2. Bilateral carotid artery stenosis I65.23 433.10      433.30    3. AV block, 1st degree I44.0 426.11    4. Hypercholesterolemia E78.00 272.0         Plan:     1. Essential hypertension  BP controlled. Continue anti-hypertensive therapy and low sodium diet  Repeat BMP today    2. Bilateral carotid artery stenosis  Carotid duplex in 6/2019 with bilateral (50-69%) stenosis. Discussed disease, plan for yearly monitoring for progression. Continue with statin therapy  Dizziness persists, not worsened with HCTZ -- recommend seeing PCP/ENT for further workup to r/o labrynthitis or ETD    3. AV block, 1st degree  Continue to avoid AV victorina agents    4. Hypercholesterolemia  Continue statin therapy; encouraged low fat, low cholesterol diet    F/u in 6 months with Dr. Lalit Falk, [facility]       Patient seen and examined by me with nurse practitioner. Marianela Mensah personally performed all components of the history, physical, and medical decision making and agree with the assessment and plan with minor modifications as noted.     Jefe Tracy MD

## 2019-07-18 NOTE — PROGRESS NOTES
1. Have you been to the ER, urgent care clinic since your last visit? Hospitalized since your last visit? No.    2. Have you seen or consulted any other health care providers outside of the [address] [place] [address] since your last visit? Include any pap smears or colon screening.    No.      Chief Complaint   Patient presents with    Dizziness     1 month f/u for BP check -

## 2019-08-06 ENCOUNTER — HOSPITAL ENCOUNTER (OUTPATIENT)
Dept: LAB | Age: 84
Discharge: HOME | End: 2019-08-06
Payer: MEDICARE

## 2019-08-06 PROCEDURE — 36415 COLL VENOUS BLD VENIPUNCTURE: CPT

## 2019-08-06 PROCEDURE — 80048 BASIC METABOLIC PNL TOTAL CA: CPT

## 2019-08-07 LAB
BUN SERPL-MCNC: 25 MG/DL (ref 10–36)
BUN/CREAT SERPL: 15 (ref 10–24)
CALCIUM SERPL-MCNC: 9.2 MG/DL (ref 8.6–10.2)
CHLORIDE SERPL-SCNC: 102 MMOL/L (ref 96–106)
CO2 SERPL-SCNC: 23 MMOL/L (ref 20–29)
CREAT SERPL-MCNC: 1.63 MG/DL (ref 0.76–1.27)
GLUCOSE SERPL-MCNC: 87 MG/DL (ref 65–99)
INTERPRETATION: NORMAL
POTASSIUM SERPL-SCNC: 4.7 MMOL/L (ref 3.5–5.2)
SODIUM SERPL-SCNC: 140 MMOL/L (ref 134–144)

## 2019-08-07 NOTE — PROGRESS NOTES
Gifty,    Please call the patient and inform that kidney function has slightly decreased -- likely from the HCTZ he started. I would suggest reducing dose to 12.5 mg daily and making sure he drinks plenty of fluids. If he has worsening edema after lowering his dose, please have him call us. Also, make sure he is monitoring his BP and call us if he notices increase in BP > 150/90 after reducing dose.     Thanks,  [employer]

## 2019-08-10 DIAGNOSIS — I10 ESSENTIAL HYPERTENSION: ICD-10-CM

## 2019-08-12 RX ORDER — HYDROCHLOROTHIAZIDE 12.5 MG/1
TABLET ORAL
Qty: 30 TAB | Refills: 1 | Status: SHIPPED | OUTPATIENT
Start: 2019-08-12 | End: 2019-11-10 | Stop reason: SDUPTHER

## 2019-08-16 ENCOUNTER — TELEPHONE (OUTPATIENT)
Dept: CARDIOLOGY CLINIC | Age: 84
End: 2019-08-16

## 2019-08-16 NOTE — TELEPHONE ENCOUNTER
----- Message from Paul Hernandes NP sent at 8/7/2019  2:04 PM EDT -----  Gifty,    Please call the patient and inform that kidney function has slightly decreased -- likely from the HCTZ he started. I would suggest reducing dose to 12.5 mg daily and making sure he drinks plenty of fluids. If he has worsening edema after lowering his dose, please have him call us. Also, make sure he is monitoring his BP and call us if he notices increase in BP > 150/90 after reducing dose. Thanks,  Jose Manuel Barbosa pt,verified pt with two pt identifiers, relayed message above to pt. Pt got his bottle and his HCTZ is 12.5 mg already. I advised I would send this to the NP for further advice and call back. Pt verbalized understanding. Message   Received: Today   Message Contents   Segundo Albrecht, [facility] Gonzalez LPN   Caller: Unspecified (Today,  2:41 PM)             I would reduce HCTZ 6.25 daily -- monitor for new swelling, and use compression stockings (I think he's already doing this)     Thanks,   Rose          Left 2 messages to call me back. Called pt,verified pt with two pt identifiers, told pt that since he is on the HCTZ 12.5 already , our NP wants him to decrease to 6.25 mg daily. Advised to cut 12.5 mg tab in half. Advised to wear compression hose daily. Advised to monitor for any abnormal swelling in lower half. Pt verbalized understanding and had no further questions.

## 2019-11-10 DIAGNOSIS — I10 ESSENTIAL HYPERTENSION: ICD-10-CM

## 2019-11-11 RX ORDER — HYDROCHLOROTHIAZIDE 12.5 MG/1
TABLET ORAL
Qty: 30 TAB | Refills: 1 | Status: SHIPPED | OUTPATIENT
Start: 2019-11-11 | End: 2019-12-19

## 2019-12-19 DIAGNOSIS — I10 ESSENTIAL HYPERTENSION: ICD-10-CM

## 2019-12-19 RX ORDER — HYDROCHLOROTHIAZIDE 12.5 MG/1
TABLET ORAL
Qty: 30 TAB | Refills: 1 | Status: SHIPPED | OUTPATIENT
Start: 2019-12-19 | End: 2020-02-12

## 2020-01-16 ENCOUNTER — OFFICE VISIT (OUTPATIENT)
Dept: CARDIOLOGY CLINIC | Age: 85
End: 2020-01-16

## 2020-01-16 VITALS
OXYGEN SATURATION: 97 % | RESPIRATION RATE: 16 BRPM | SYSTOLIC BLOOD PRESSURE: 146 MMHG | DIASTOLIC BLOOD PRESSURE: 88 MMHG | HEART RATE: 50 BPM | WEIGHT: 172.5 LBS | BODY MASS INDEX: 29.45 KG/M2 | HEIGHT: 64 IN

## 2020-01-16 DIAGNOSIS — I44.0 AV BLOCK, 1ST DEGREE: ICD-10-CM

## 2020-01-16 DIAGNOSIS — I10 ESSENTIAL HYPERTENSION: Primary | ICD-10-CM

## 2020-01-16 DIAGNOSIS — I65.23 BILATERAL CAROTID ARTERY STENOSIS: ICD-10-CM

## 2020-01-16 DIAGNOSIS — E78.00 HYPERCHOLESTEROLEMIA: ICD-10-CM

## 2020-01-16 NOTE — PROGRESS NOTES
Laurent Glover, [facility]    Subjective/HPI:     Mr. Carlos Handley is a 80 y.o. male is here for routine f/u. He has a PMHx of sinus bradycardia with 1st degree AV block, HLD and GERD. He is doing well. He denies complaints of chest pains, orthopnea, or PND. He denies shortness of breath. He reports edema is improved since being on HCTZ. He does not check BP at home, and does not own a BP cuff. He notes dizziness has improved over the past 5 months, but has not resolved completely. PCP Provider  Kristin Ott MD  Past Medical History:   Diagnosis Date    Abdominal pain     Dyspepsia and other specified disorders of function of stomach     GERD (gastroesophageal reflux disease)     Hypercholesterolemia     Thyroid disease       Past Surgical History:   Procedure Laterality Date    HX CATARACT REMOVAL      bilateral    HX CATARACT REMOVAL      HX CHOLECYSTECTOMY      HX GI  2010    colon surgery?     HX HERNIA REPAIR      HX OPEN CHOLECYSTECTOMY      HX ORTHOPAEDIC      ingrown left toenail-great toe    HX OTHER SURGICAL      colonoscopy    HX TONSILLECTOMY       Family History   Problem Relation Age of Onset    Heart Disease Mother     Heart Disease Father      Social History     Socioeconomic History    Marital status:      Spouse name: Not on file    Number of children: Not on file    Years of education: Not on file    Highest education level: Not on file   Occupational History    Not on file   Social Needs    Financial resource strain: Not on file    Food insecurity:     Worry: Not on file     Inability: Not on file    Transportation needs:     Medical: Not on file     Non-medical: Not on file   Tobacco Use    Smoking status: Never Smoker    Smokeless tobacco: Never Used   Substance and Sexual Activity    Alcohol use: Yes     Comment: social    Drug use: Unknown     Types: Prescription    Sexual activity: Not on file   Lifestyle    Physical activity: Days per week: Not on file     Minutes per session: Not on file    Stress: Not on file   Relationships    Social connections:     Talks on phone: Not on file     Gets together: Not on file     Attends [employer]ism service: Not on file     Active member of club or organization: Not on file     Attends meetings of clubs or organizations: Not on file     Relationship status: Not on file    Intimate partner violence:     Fear of current or ex partner: Not on file     Emotionally abused: Not on file     Physically abused: Not on file     Forced sexual activity: Not on file   Other Topics Concern    Not on file   Social History Narrative    ** Merged History Encounter **            Allergies   Allergen Reactions    Morphine Nausea and Vomiting    Morphine Nausea and Vomiting    Pcn [Penicillins] Rash and Swelling    Pcn [Penicillins] Rash        Current Outpatient Medications   Medication Sig    hydroCHLOROthiazide (HYDRODIURIL) 12.5 mg tablet TAKE 1 TABLET BY MOUTH EVERY DAY (Patient taking differently: Take 6.25 mg by mouth. TAKE 1 TABLET BY MOUTH EVERY DAY)    hydrocortisone valerate (WESTCORT) 0.2 % topical cream APPLY TO AFFECTED AREA DAILY AS NEEDED    colestipol (COLESTID) 1 gram tablet Take 1 g by mouth two (2) times a day.  levothyroxine (SYNTHROID) 100 mcg tablet Take 100 mcg by mouth daily (before breakfast). No current facility-administered medications for this visit. I have reviewed the problem list, allergy list, medical history, family, social history and medications. Review of Symptoms:    Review of Systems   Constitutional: Negative for chills, fever and weight loss. HENT: Negative for nosebleeds. Eyes: Negative for blurred vision and double vision. Respiratory: Negative for cough, shortness of breath and wheezing. Cardiovascular: Negative for chest pain, palpitations, orthopnea, leg swelling and PND.    Gastrointestinal: Negative for abdominal pain, blood in stool, diarrhea, nausea and vomiting. Musculoskeletal: Negative for joint pain. Skin: Negative for rash. Neurological: Negative for dizziness, tingling and loss of consciousness. Endo/Heme/Allergies: Does not bruise/bleed easily. Physical Exam:      General: Well developed, in no acute distress, cooperative and alert  HEENT: No carotid bruits, no JVD, trach is midline. Neck Supple, PEERL, EOM intact. Heart:  reg rate and rhythm; normal S1/S2; no murmurs, gallops or rubs. Respiratory: Clear bilaterally x 4, no wheezing or rales  Abdomen:   Soft, non-tender, no distention, no masses. + BS. Extremities:  Normal cap refill, no cyanosis, atraumatic. Trace non-pitting edema  Neuro: A&Ox3, speech clear, gait stable. Skin: Skin color is normal. No rashes or lesions.  Non diaphoretic  Vascular: 2+ pulses symmetric in all extremities    Vitals:    01/16/20 1431 01/16/20 1432 01/16/20 1500   BP: 150/70 160/70 146/88   Pulse: (!) 50     Resp: 16     SpO2: 97%     Weight: 172 lb 8 oz (78.2 kg)     Height: 5' 4\" (1.626 m)         Cardiographics    ECG: sinus bradycardia with PACs; 1st degree AV block; RBBB  Results for orders placed or performed during the hospital encounter of 06/04/17   EKG, 12 LEAD, INITIAL   Result Value Ref Range    Ventricular Rate 69 BPM    Atrial Rate 69 BPM    P-R Interval 356 ms    QRS Duration 92 ms    Q-T Interval 416 ms    QTC Calculation (Bezet) 445 ms    Calculated P Axis 33 degrees    Calculated R Axis 44 degrees    Calculated T Axis -3 degrees    Diagnosis       Sinus rhythm with 1st degree AV block with frequent premature ventricular   complexes in a pattern of bigeminy    Confirmed by Kash Corral MD, Andressa Jenkins ([id number]) on 6/5/2017 11:58:03 AM         Cardiology Labs:  Lab Results   Component Value Date/Time    Cholesterol, total 194 06/13/2018 03:27 PM    HDL Cholesterol 51 06/13/2018 03:27 PM    LDL, calculated 114 (H) 06/13/2018 03:27 PM    Triglyceride 143 06/13/2018 03:27 PM       Lab Results   Component Value Date/Time    Sodium 140 08/06/2019 04:05 PM    Potassium 4.7 08/06/2019 04:05 PM    Chloride 102 08/06/2019 04:05 PM    CO2 23 08/06/2019 04:05 PM    Anion gap 5 06/04/2017 03:06 PM    Glucose 87 08/06/2019 04:05 PM    BUN 25 08/06/2019 04:05 PM    Creatinine 1.63 (H) 08/06/2019 04:05 PM    BUN/Creatinine ratio 15 08/06/2019 04:05 PM    GFR est AA 42 (L) 08/06/2019 04:05 PM    GFR est non-AA 37 (L) 08/06/2019 04:05 PM    Calcium 9.2 08/06/2019 04:05 PM    Bilirubin, total 1.0 06/13/2018 03:27 PM    AST (SGOT) 22 06/13/2018 03:27 PM    Alk. phosphatase 95 06/13/2018 03:27 PM    Protein, total 6.5 06/13/2018 03:27 PM    Albumin 4.5 06/13/2018 03:27 PM    Globulin 3.0 06/04/2017 03:06 PM    A-G Ratio 2.3 (H) 06/13/2018 03:27 PM    ALT (SGPT) 14 06/13/2018 03:27 PM           Assessment:     Assessment:       ICD-10-CM ICD-9-CM    1. Essential hypertension I10 401.9 AMB POC EKG ROUTINE W/ 12 LEADS, INTER & REP   2. Bilateral carotid artery stenosis I65.23 433.10      433.30    3. AV block, 1st degree I44.0 426.11    4. Hypercholesterolemia E78.00 272.0         Plan:     1. Essential hypertension  BP remains controlled; had increase in serum Cr with higher dose on HCTZ, so will maintain low dose for now  Advised to monitor BP at home for 2 weeks; if readings > 140/80, would consider addition of hydralazine  Continue low sodium diet    2. Bilateral carotid artery stenosis  Carotid duplex in 6/2019 with bilateral (50-69%) stenosis. Discussed disease, plan for yearly monitoring for progression. Continue with statin therapy    3. AV block, 1st degree  Continue to avoid AV victorina agents    4.  Hypercholesterolemia  Continue statin therapy; encouraged low fat, low cholesterol diet    F/u in 6 months with Dr. Pascual Gibson, NP

## 2020-01-16 NOTE — PROGRESS NOTES
1. Have you been to the ER, urgent care clinic since your last visit? Hospitalized since your last visit? No    2. Have you seen or consulted any other health care providers outside of the [address] [place] [address] since your last visit? Include any pap smears or colon screening.   Yes PCP    Chief Complaint   Patient presents with    Follow-up    Hypertension

## 2020-02-03 ENCOUNTER — TELEPHONE (OUTPATIENT)
Dept: CARDIOLOGY CLINIC | Age: 85
End: 2020-02-03

## 2020-02-03 NOTE — TELEPHONE ENCOUNTER
----- Message from Shelbi Webster NP sent at 1/16/2020  4:50 PM EST -----  Regarding: BP  Gifty,    Please call the patient (if he has not already called you) about his BP. I recommended he buy a BP cuff and check his BP daily over the next 2 weeks. Please let me know what his BP have been running. Thanks,  ONEOK pt but unable to leave a message due to mail box being full. Will try later.

## 2020-02-05 RX ORDER — HYDRALAZINE HYDROCHLORIDE 50 MG/1
50 TABLET, FILM COATED ORAL 2 TIMES DAILY
COMMUNITY
End: 2020-02-05 | Stop reason: SDUPTHER

## 2020-02-05 NOTE — TELEPHONE ENCOUNTER
Message   Received: Yesterday   Message Contents   Chucky River, PURVI Malhotra LPN   Caller: Unspecified (2 days ago,  8:50 AM)             Reviewed -- generally in the 140-150s/60-80s. Please start Hydralazine 50 mg BID   Call back in 2 weeks with BP readings     Thanks,   Gilma Schwarz          Called pt,verified pt with two pt identifiers, advised pt we received her recent BP readings and our NP did review them and advised that systolic number is still remaining high in 's. Advised to start Hydralazine 50 mg tab bid. Verified pharmacy and I would send to NP. Advised to give 24-48 hours for the pharmacy to receive. Advised to take BP for the next 2 weeks and send us the BP numbers again. Pt verbalized understanding.       Sent refill to NP for approval.

## 2020-02-06 RX ORDER — HYDRALAZINE HYDROCHLORIDE 50 MG/1
50 TABLET, FILM COATED ORAL 2 TIMES DAILY
Qty: 60 TAB | Refills: 5 | Status: ON HOLD | OUTPATIENT
Start: 2020-02-06 | End: 2020-07-30

## 2020-02-11 ENCOUNTER — TELEPHONE (OUTPATIENT)
Dept: CARDIOLOGY CLINIC | Age: 85
End: 2020-02-11

## 2020-02-12 DIAGNOSIS — I10 ESSENTIAL HYPERTENSION: ICD-10-CM

## 2020-02-12 RX ORDER — HYDROCHLOROTHIAZIDE 12.5 MG/1
TABLET ORAL
Qty: 30 TAB | Refills: 1 | Status: SHIPPED | OUTPATIENT
Start: 2020-02-12 | End: 2020-02-27 | Stop reason: SDUPTHER

## 2020-02-27 DIAGNOSIS — I10 ESSENTIAL HYPERTENSION: ICD-10-CM

## 2020-02-27 RX ORDER — HYDROCHLOROTHIAZIDE 12.5 MG/1
TABLET ORAL
Qty: 90 TAB | Refills: 1 | Status: SHIPPED | OUTPATIENT
Start: 2020-02-27 | End: 2020-08-01

## 2020-03-02 ENCOUNTER — TELEPHONE (OUTPATIENT)
Dept: CARDIOLOGY CLINIC | Age: 85
End: 2020-03-02

## 2020-03-04 NOTE — TELEPHONE ENCOUNTER
Message   Received: Yesterday   Message Contents   Ann-Marie Islas, NP  Mitzi Lazaro LPN   Caller: Unspecified (2 days ago,  4:22 PM)             BP records reviewed   They look good, no hypotension, BPs well controlled   No changes to meds     Rose Vaughan            Called pt and left message to call me regarding his BP readings. Returned pt's call,verified pt with two pt identifiers, advised pt we received his BP readings and the NP is advising they look good, no low BP and to continue current medications. Advised he can continue to monitor his BP and let us know if too high or too low. Pt verbalized understanding.

## 2020-07-30 ENCOUNTER — HOSPITAL ENCOUNTER (INPATIENT)
Age: 85
LOS: 2 days | Discharge: HOME | DRG: 242 | End: 2020-08-01
Attending: EMERGENCY MEDICINE | Admitting: INTERNAL MEDICINE
Payer: MEDICARE

## 2020-07-30 ENCOUNTER — APPOINTMENT (OUTPATIENT)
Dept: ULTRASOUND IMAGING | Age: 85
DRG: 242 | End: 2020-07-30
Attending: INTERNAL MEDICINE
Payer: MEDICARE

## 2020-07-30 ENCOUNTER — APPOINTMENT (OUTPATIENT)
Dept: GENERAL RADIOLOGY | Age: 85
DRG: 242 | End: 2020-07-30
Attending: EMERGENCY MEDICINE
Payer: MEDICARE

## 2020-07-30 ENCOUNTER — APPOINTMENT (OUTPATIENT)
Dept: NON INVASIVE DIAGNOSTICS | Age: 85
DRG: 242 | End: 2020-07-30
Attending: INTERNAL MEDICINE
Payer: MEDICARE

## 2020-07-30 DIAGNOSIS — I49.5 SSS (SICK SINUS SYNDROME) (HCC): ICD-10-CM

## 2020-07-30 DIAGNOSIS — R07.89 CHEST PRESSURE: ICD-10-CM

## 2020-07-30 DIAGNOSIS — R60.9 PERIPHERAL EDEMA: ICD-10-CM

## 2020-07-30 DIAGNOSIS — R94.31 T WAVE INVERSION IN EKG: ICD-10-CM

## 2020-07-30 DIAGNOSIS — I50.9 ACUTE CONGESTIVE HEART FAILURE, UNSPECIFIED HEART FAILURE TYPE (HCC): Primary | ICD-10-CM

## 2020-07-30 LAB
ANION GAP SERPL CALC-SCNC: 8 MMOL/L (ref 5–15)
APPEARANCE UR: CLEAR
ATRIAL RATE: 68 BPM
BACTERIA URNS QL MICRO: NEGATIVE /HPF
BASOPHILS # BLD: 0 K/UL (ref 0–0.1)
BASOPHILS NFR BLD: 0 % (ref 0–1)
BILIRUB UR QL: NEGATIVE
BNP SERPL-MCNC: 6962 PG/ML
BUN SERPL-MCNC: 34 MG/DL (ref 6–20)
BUN/CREAT SERPL: 19 (ref 12–20)
CALCIUM SERPL-MCNC: 8.9 MG/DL (ref 8.5–10.1)
CALCULATED R AXIS, ECG10: 61 DEGREES
CALCULATED T AXIS, ECG11: -7 DEGREES
CHLORIDE SERPL-SCNC: 109 MMOL/L (ref 97–108)
CHLORIDE UR-SCNC: 151 MMOL/L
CK SERPL-CCNC: 156 U/L (ref 39–308)
CO2 SERPL-SCNC: 21 MMOL/L (ref 21–32)
COLOR UR: NORMAL
CREAT SERPL-MCNC: 1.82 MG/DL (ref 0.7–1.3)
DIAGNOSIS, 93000: NORMAL
DIFFERENTIAL METHOD BLD: ABNORMAL
ECHO AO ROOT DIAM: 3.55 CM
ECHO AV AREA PEAK VELOCITY: 1.85 CM2
ECHO AV AREA PEAK VELOCITY: 1.94 CM2
ECHO AV PEAK GRADIENT: 5.42 MMHG
ECHO EST RA PRESSURE: 10 MMHG
ECHO LA AREA 4C: 13.95 CM2
ECHO LA MAJOR AXIS: 3.82 CM
ECHO LA MINOR AXIS: 2.05 CM
ECHO LA TO AORTIC ROOT RATIO: 1.52
ECHO LA VOL 4C: 28.83 ML (ref 18–58)
ECHO LA VOLUME INDEX A4C: 15.45 ML/M2 (ref 16–28)
ECHO LV E' LATERAL VELOCITY: 10.37 CM/S
ECHO LV E' SEPTAL VELOCITY: 7.21 CM/S
ECHO LV INTERNAL DIMENSION DIASTOLIC: 3.85 CM (ref 4.2–5.9)
ECHO LV INTERNAL DIMENSION SYSTOLIC: 3.27 CM
ECHO LV IVSD: 1.81 CM (ref 0.6–1)
ECHO LV MASS 2D: 212.4 G (ref 88–224)
ECHO LV MASS INDEX 2D: 113.8 G/M2 (ref 49–115)
ECHO LV POSTERIOR WALL DIASTOLIC: 1.12 CM (ref 0.6–1)
ECHO LVOT CARDIAC OUTPUT: 2.91 LITER/MINUTE
ECHO LVOT DIAM: 1.54 CM
ECHO LVOT PEAK GRADIENT: 5.32 MMHG
ECHO LVOT PEAK GRADIENT: 5.86 MMHG
ECHO LVOT PEAK VELOCITY: 115.34 CM/S
ECHO LVOT PEAK VELOCITY: 121.01 CM/S
ECHO LVOT SV: 48.2 ML
ECHO LVOT VTI: 26.06 CM
ECHO MV A VELOCITY: 84.1 CM/S
ECHO MV AREA VTI: 1.31 CM2
ECHO MV E DECELERATION TIME (DT): 0.16 S
ECHO MV E VELOCITY: 99.48 CM/S
ECHO MV E/A RATIO: 1.18
ECHO MV E/E' LATERAL: 9.59
ECHO MV E/E' RATIO (AVERAGED): 11.7
ECHO MV E/E' SEPTAL: 13.8
ECHO MV MAX VELOCITY: 117.77 CM/S
ECHO MV MEAN GRADIENT: 1.94 MMHG
ECHO MV PEAK GRADIENT: 5.55 MMHG
ECHO MV VTI: 36.81 CM
ECHO PV REGURGITANT MAX VELOCITY: 115.31 CM/S
ECHO PV REGURGITANT MAX VELOCITY: 141.36 CM/S
ECHO RA AREA 4C: 15.24 CM2
ECHO RIGHT VENTRICULAR SYSTOLIC PRESSURE (RVSP): 50.86 MMHG
ECHO TV REGURGITANT MAX VELOCITY: 319.12 CM/S
ECHO TV REGURGITANT PEAK GRADIENT: 40.86 MMHG
EOSINOPHIL # BLD: 0.2 K/UL (ref 0–0.4)
EOSINOPHIL NFR BLD: 3 % (ref 0–7)
EPITH CASTS URNS QL MICRO: NORMAL /LPF
ERYTHROCYTE [DISTWIDTH] IN BLOOD BY AUTOMATED COUNT: 14.3 % (ref 11.5–14.5)
GLUCOSE SERPL-MCNC: 96 MG/DL (ref 65–100)
GLUCOSE UR STRIP.AUTO-MCNC: NEGATIVE MG/DL
HCT VFR BLD AUTO: 36.8 % (ref 36.6–50.3)
HGB BLD-MCNC: 11.9 G/DL (ref 12.1–17)
HGB UR QL STRIP: NEGATIVE
HYALINE CASTS URNS QL MICRO: NORMAL /LPF (ref 0–5)
IMM GRANULOCYTES # BLD AUTO: 0 K/UL (ref 0–0.04)
IMM GRANULOCYTES NFR BLD AUTO: 0 % (ref 0–0.5)
KETONES UR QL STRIP.AUTO: NEGATIVE MG/DL
LEUKOCYTE ESTERASE UR QL STRIP.AUTO: NEGATIVE
LVOT MG: 2.25 MMHG
LYMPHOCYTES # BLD: 2 K/UL (ref 0.8–3.5)
LYMPHOCYTES NFR BLD: 28 % (ref 12–49)
MAGNESIUM SERPL-MCNC: 2.5 MG/DL (ref 1.6–2.4)
MCH RBC QN AUTO: 30.9 PG (ref 26–34)
MCHC RBC AUTO-ENTMCNC: 32.3 G/DL (ref 30–36.5)
MCV RBC AUTO: 95.6 FL (ref 80–99)
MONOCYTES # BLD: 0.8 K/UL (ref 0–1)
MONOCYTES NFR BLD: 11 % (ref 5–13)
NEUTS SEG # BLD: 4.1 K/UL (ref 1.8–8)
NEUTS SEG NFR BLD: 58 % (ref 32–75)
NITRITE UR QL STRIP.AUTO: NEGATIVE
NRBC # BLD: 0 K/UL (ref 0–0.01)
NRBC BLD-RTO: 0 PER 100 WBC
P-R INTERVAL, ECG05: 480 MS
PH UR STRIP: 5 [PH] (ref 5–8)
PHOSPHATE SERPL-MCNC: 3.4 MG/DL (ref 2.6–4.7)
PLATELET # BLD AUTO: 209 K/UL (ref 150–400)
PMV BLD AUTO: 11.2 FL (ref 8.9–12.9)
POTASSIUM SERPL-SCNC: 4.3 MMOL/L (ref 3.5–5.1)
PROT UR STRIP-MCNC: NEGATIVE MG/DL
Q-T INTERVAL, ECG07: 396 MS
QRS DURATION, ECG06: 122 MS
QTC CALCULATION (BEZET), ECG08: 421 MS
RBC # BLD AUTO: 3.85 M/UL (ref 4.1–5.7)
RBC #/AREA URNS HPF: NORMAL /HPF (ref 0–5)
SODIUM SERPL-SCNC: 138 MMOL/L (ref 136–145)
SODIUM UR-SCNC: 138 MMOL/L
SP GR UR REFRACTOMETRY: 1.01 (ref 1–1.03)
TROPONIN I SERPL-MCNC: 0.05 NG/ML
TROPONIN I SERPL-MCNC: <0.05 NG/ML
UA: UC IF INDICATED,UAUC: NORMAL
URATE SERPL-MCNC: 7.5 MG/DL (ref 3.5–7.2)
UROBILINOGEN UR QL STRIP.AUTO: 0.2 EU/DL (ref 0.2–1)
VENTRICULAR RATE, ECG03: 68 BPM
WBC # BLD AUTO: 7.1 K/UL (ref 4.1–11.1)
WBC URNS QL MICRO: NORMAL /HPF (ref 0–4)

## 2020-07-30 PROCEDURE — 65660000000 HC RM CCU STEPDOWN

## 2020-07-30 PROCEDURE — 96374 THER/PROPH/DIAG INJ IV PUSH: CPT

## 2020-07-30 PROCEDURE — 93005 ELECTROCARDIOGRAM TRACING: CPT

## 2020-07-30 PROCEDURE — 86160 COMPLEMENT ANTIGEN: CPT

## 2020-07-30 PROCEDURE — 74011250636 HC RX REV CODE- 250/636: Performed by: INTERNAL MEDICINE

## 2020-07-30 PROCEDURE — 85025 COMPLETE CBC W/AUTO DIFF WBC: CPT

## 2020-07-30 PROCEDURE — 99218 HC RM OBSERVATION: CPT

## 2020-07-30 PROCEDURE — 36415 COLL VENOUS BLD VENIPUNCTURE: CPT

## 2020-07-30 PROCEDURE — 84300 ASSAY OF URINE SODIUM: CPT

## 2020-07-30 PROCEDURE — 93970 EXTREMITY STUDY: CPT

## 2020-07-30 PROCEDURE — 80048 BASIC METABOLIC PNL TOTAL CA: CPT

## 2020-07-30 PROCEDURE — 94760 N-INVAS EAR/PLS OXIMETRY 1: CPT

## 2020-07-30 PROCEDURE — 76770 US EXAM ABDO BACK WALL COMP: CPT

## 2020-07-30 PROCEDURE — 83735 ASSAY OF MAGNESIUM: CPT

## 2020-07-30 PROCEDURE — 84100 ASSAY OF PHOSPHORUS: CPT

## 2020-07-30 PROCEDURE — 84550 ASSAY OF BLOOD/URIC ACID: CPT

## 2020-07-30 PROCEDURE — 81001 URINALYSIS AUTO W/SCOPE: CPT

## 2020-07-30 PROCEDURE — 74011250636 HC RX REV CODE- 250/636: Performed by: EMERGENCY MEDICINE

## 2020-07-30 PROCEDURE — 82550 ASSAY OF CK (CPK): CPT

## 2020-07-30 PROCEDURE — 71045 X-RAY EXAM CHEST 1 VIEW: CPT

## 2020-07-30 PROCEDURE — 74011250637 HC RX REV CODE- 250/637: Performed by: INTERNAL MEDICINE

## 2020-07-30 PROCEDURE — 99285 EMERGENCY DEPT VISIT HI MDM: CPT

## 2020-07-30 PROCEDURE — 96376 TX/PRO/DX INJ SAME DRUG ADON: CPT

## 2020-07-30 PROCEDURE — 84484 ASSAY OF TROPONIN QUANT: CPT

## 2020-07-30 PROCEDURE — 82436 ASSAY OF URINE CHLORIDE: CPT

## 2020-07-30 PROCEDURE — 96372 THER/PROPH/DIAG INJ SC/IM: CPT

## 2020-07-30 PROCEDURE — 83880 ASSAY OF NATRIURETIC PEPTIDE: CPT

## 2020-07-30 PROCEDURE — 93306 TTE W/DOPPLER COMPLETE: CPT

## 2020-07-30 RX ORDER — FUROSEMIDE 10 MG/ML
20 INJECTION INTRAMUSCULAR; INTRAVENOUS
Status: COMPLETED | OUTPATIENT
Start: 2020-07-30 | End: 2020-07-30

## 2020-07-30 RX ORDER — FUROSEMIDE 10 MG/ML
40 INJECTION INTRAMUSCULAR; INTRAVENOUS DAILY
Status: DISCONTINUED | OUTPATIENT
Start: 2020-07-30 | End: 2020-07-31

## 2020-07-30 RX ORDER — HYDRALAZINE HYDROCHLORIDE 25 MG/1
25 TABLET, FILM COATED ORAL 2 TIMES DAILY
COMMUNITY
End: 2020-08-01

## 2020-07-30 RX ORDER — ACETAMINOPHEN 650 MG/1
650 SUPPOSITORY RECTAL
Status: DISCONTINUED | OUTPATIENT
Start: 2020-07-30 | End: 2020-08-01 | Stop reason: HOSPADM

## 2020-07-30 RX ORDER — ENOXAPARIN SODIUM 100 MG/ML
30 INJECTION SUBCUTANEOUS DAILY
Status: DISCONTINUED | OUTPATIENT
Start: 2020-07-30 | End: 2020-08-01 | Stop reason: HOSPADM

## 2020-07-30 RX ORDER — SODIUM CHLORIDE 0.9 % (FLUSH) 0.9 %
5-40 SYRINGE (ML) INJECTION AS NEEDED
Status: DISCONTINUED | OUTPATIENT
Start: 2020-07-30 | End: 2020-08-01 | Stop reason: HOSPADM

## 2020-07-30 RX ORDER — ACETAMINOPHEN 325 MG/1
650 TABLET ORAL
Status: DISCONTINUED | OUTPATIENT
Start: 2020-07-30 | End: 2020-08-01 | Stop reason: HOSPADM

## 2020-07-30 RX ORDER — PANTOPRAZOLE SODIUM 40 MG/1
40 TABLET, DELAYED RELEASE ORAL
Status: DISCONTINUED | OUTPATIENT
Start: 2020-07-31 | End: 2020-08-01 | Stop reason: HOSPADM

## 2020-07-30 RX ORDER — OMEPRAZOLE 40 MG/1
20 CAPSULE, DELAYED RELEASE ORAL DAILY
Status: ON HOLD | COMMUNITY
End: 2020-07-30

## 2020-07-30 RX ORDER — HYDRALAZINE HYDROCHLORIDE 20 MG/ML
10 INJECTION INTRAMUSCULAR; INTRAVENOUS
Status: DISCONTINUED | OUTPATIENT
Start: 2020-07-30 | End: 2020-08-01 | Stop reason: HOSPADM

## 2020-07-30 RX ORDER — POLYETHYLENE GLYCOL 3350 17 G/17G
17 POWDER, FOR SOLUTION ORAL DAILY PRN
Status: DISCONTINUED | OUTPATIENT
Start: 2020-07-30 | End: 2020-08-01 | Stop reason: HOSPADM

## 2020-07-30 RX ORDER — HYDRALAZINE HYDROCHLORIDE 50 MG/1
50 TABLET, FILM COATED ORAL 2 TIMES DAILY
Status: DISCONTINUED | OUTPATIENT
Start: 2020-07-30 | End: 2020-08-01 | Stop reason: HOSPADM

## 2020-07-30 RX ORDER — LEVOTHYROXINE SODIUM 50 UG/1
100 TABLET ORAL
Status: DISCONTINUED | OUTPATIENT
Start: 2020-07-30 | End: 2020-08-01 | Stop reason: HOSPADM

## 2020-07-30 RX ORDER — PROMETHAZINE HYDROCHLORIDE 25 MG/1
12.5 TABLET ORAL
Status: DISCONTINUED | OUTPATIENT
Start: 2020-07-30 | End: 2020-08-01 | Stop reason: HOSPADM

## 2020-07-30 RX ORDER — ONDANSETRON 2 MG/ML
4 INJECTION INTRAMUSCULAR; INTRAVENOUS
Status: DISCONTINUED | OUTPATIENT
Start: 2020-07-30 | End: 2020-08-01 | Stop reason: HOSPADM

## 2020-07-30 RX ORDER — OMEPRAZOLE 20 MG/1
20 CAPSULE, DELAYED RELEASE ORAL DAILY
COMMUNITY

## 2020-07-30 RX ORDER — SODIUM CHLORIDE 0.9 % (FLUSH) 0.9 %
5-40 SYRINGE (ML) INJECTION EVERY 8 HOURS
Status: DISCONTINUED | OUTPATIENT
Start: 2020-07-30 | End: 2020-08-01 | Stop reason: HOSPADM

## 2020-07-30 RX ADMIN — LEVOTHYROXINE SODIUM 100 MCG: 0.1 TABLET ORAL at 11:42

## 2020-07-30 RX ADMIN — Medication 10 ML: at 21:18

## 2020-07-30 RX ADMIN — FUROSEMIDE 20 MG: 10 INJECTION, SOLUTION INTRAMUSCULAR; INTRAVENOUS at 04:45

## 2020-07-30 RX ADMIN — HYDRALAZINE HYDROCHLORIDE 50 MG: 50 TABLET, FILM COATED ORAL at 18:43

## 2020-07-30 RX ADMIN — ENOXAPARIN SODIUM 30 MG: 30 INJECTION SUBCUTANEOUS at 12:27

## 2020-07-30 RX ADMIN — FUROSEMIDE 40 MG: 10 INJECTION, SOLUTION INTRAMUSCULAR; INTRAVENOUS at 12:27

## 2020-07-30 RX ADMIN — Medication 10 ML: at 11:43

## 2020-07-30 NOTE — H&P
Hospitalist Admission Note    NAME: Rubina Yao   :  10/25/1928   MRN:  [id number]     Date/Time:  2020 7:08 AM    Patient PCP: Lydia Rubi MD  _____________________________________________________________________  Given the patient's current clinical presentation, I have a high level of concern for decompensation if discharged from the emergency department. Complex decision making was performed, which includes reviewing the patient's available past medical records, laboratory results, and x-ray films. My assessment of this patient's clinical condition and my plan of care is as follows. Assessment / Plan:  1. CHF exacerbation: Unknown type. He denies hx of this and last echo was in 2019 which was normal. Lasix ordered, strict Is and Os, daily weights, cardiology consult in AM doppler LE, prBNP is almost 7,000    2. HTM: Medications will be adjusted for new dx of CHF    3. GERD: Protonix    4. Acute on CKD syage 3: he is not too far from his baseline but given his edema, renal US and basic workup will be ordered. Avoud nephrotoxic agents    Code Status: FULL     Surrogate Decision Maker: Daughter    DVT Prophylaxis: lovenox sq    Echocardiogaram 2019  Result status: Final result  ·   Left Ventricle: Normal cavity size and systolic function (ejection fraction normal). Upper normal wall thickness. Estimated left ventricular ejection fraction is 56 - 60%. Visually measured ejection fraction. No regional wall motion abnormality noted. Age-appropriate left ventricular diastolic function. · Left Atrium: Mildly dilated left atrium. · Right Atrium: Mildly dilated right atrium. · Mitral Valve: Mild mitral valve regurgitation             Subjective:   CHIEF COMPLAINT:      HISTORY OF PRESENT ILLNESS:     OLESYA Wang is a 80 y.o.  male who presents with chest pressure and SOB. He has no hx of CHF but cites has had progressively worsening symptoms for 2 weeks.  His daughter is contributing historian and he endorses he has been particularly tired. EKG here shows RBBB but he was reported to have atrial fib by EMS. He endorses exertional dypnea an now occurring at rest. He and family also noted bilateral LE edema. No fever nor chills, no nausea nor vomiting    We were asked to admit for work up and evaluation of the above problems. Past Medical History:   Diagnosis Date    Abdominal pain     Dyspepsia and other specified disorders of function of stomach     GERD (gastroesophageal reflux disease)     Hypercholesterolemia     Thyroid disease         Past Surgical History:   Procedure Laterality Date    HX CATARACT REMOVAL      bilateral    HX CATARACT REMOVAL      HX CHOLECYSTECTOMY      HX GI  2010    colon surgery?  HX HERNIA REPAIR      HX OPEN CHOLECYSTECTOMY      HX ORTHOPAEDIC      ingrown left toenail-great toe    HX OTHER SURGICAL      colonoscopy    HX TONSILLECTOMY         Social History     Tobacco Use    Smoking status: Never Smoker    Smokeless tobacco: Never Used   Substance Use Topics    Alcohol use: Yes     Comment: social        Family History   Problem Relation Age of Onset    Heart Disease Mother     Heart Disease Father      Allergies   Allergen Reactions    Morphine Nausea and Vomiting    Morphine Nausea and Vomiting    Pcn [Penicillins] Rash and Swelling    Pcn [Penicillins] Rash        Prior to Admission medications    Medication Sig Start Date End Date Taking? Authorizing Provider   hydroCHLOROthiazide (HYDRODIURIL) 12.5 mg tablet 1 tab by mouth daily 2/27/20   Florin Dunne MD   hydrALAZINE (APRESOLINE) 50 mg tablet Take 1 Tab by mouth two (2) times a day. Patient taking differently: Take 25 mg by mouth two (2) times a day.  2/6/20   Kisha Soto NP   hydrocortisone valerate (WESTCORT) 0.2 % topical cream APPLY TO AFFECTED AREA DAILY AS NEEDED 4/11/19   Provider, Historical   colestipol (COLESTID) 1 gram tablet Take 1 g by mouth two (2) times a day. Provider, Historical   levothyroxine (SYNTHROID) 100 mcg tablet Take 100 mcg by mouth daily (before breakfast). Provider, Historical       REVIEW OF SYSTEMS:     I am not able to complete the review of systems because: The patient is intubated and sedated    The patient has altered mental status due to his acute medical problems    The patient has baseline aphasia from prior stroke(s)    The patient has baseline dementia and is not reliable historian    The patient is in acute medical distress and unable to provide information           Total of 12 systems reviewed as follows:       POSITIVE= underlined text  Negative = text not underlined  General:  fever, chills, sweats, generalized weakness, weight loss/gain,      loss of appetite   Eyes:    blurred vision, eye pain, loss of vision, double vision  ENT:    rhinorrhea, pharyngitis   Respiratory:   cough, sputum production, SOB, ALANIS, wheezing, pleuritic pain   Cardiology:   chest pain, palpitations, orthopnea, PND, edema, syncope   Gastrointestinal:  abdominal pain , N/V, diarrhea, dysphagia, constipation, bleeding   Genitourinary:  frequency, urgency, dysuria, hematuria, incontinence   Muskuloskeletal :  arthralgia, myalgia, back pain  Hematology:  easy bruising, nose or gum bleeding, lymphadenopathy   Dermatological: rash, ulceration, pruritis, color change / jaundice  Endocrine:   hot flashes or polydipsia   Neurological:  headache, dizziness, confusion, focal weakness, paresthesia,     Speech difficulties, memory loss, gait difficulty  Psychological: Feelings of anxiety, depression, agitation    Objective:   VITALS:    Visit Vitals  /58   Pulse (!) 57   Temp 97.4 °F (36.3 °C)   Resp 19   Ht 5' 4\" (1.626 m)   Wt 81.2 kg (179 lb)   SpO2 97%   BMI 30.73 kg/m²       PHYSICAL EXAM:    General:    Alert, cooperative, no distress, appears stated age.      HEENT: Atraumatic, anicteric sclerae, pink conjunctivae     No oral ulcers, mucosa moist, throat clear, dentition fair  Neck:  Supple, symmetrical,  thyroid: non tender  Lungs:   Clear to auscultation bilaterally. No Wheezing or Rhonchi. No rales. Chest wall:  No tenderness  No Accessory muscle use. Heart:   Regular  rhythm,  No  murmur   2-3+ b/l leg edema  Abdomen:   Soft, non-tender. Not distended. Bowel sounds normal  Extremities: No cyanosis. No clubbing,      Skin turgor normal, Capillary refill normal, Radial dial pulse 2+  Skin:     Not pale. Not Jaundiced  No rashes   Psych:  Good insight. Not depressed. Not anxious or agitated. Neurologic: EOMs intact. No facial asymmetry. No aphasia or slurred speech. Symmetrical strength, Sensation grossly intact. Alert and oriented X 4.     _______________________________________________________________________  Care Plan discussed with:    Comments   Patient x    Family  x    RN     Care Manager                    Consultant:      _______________________________________________________________________  Expected  Disposition:   Home with Family x   HH/PT/OT/RN    SNF/LTC    CHRISTIAN    ________________________________________________________________________  TOTAL TIME: 39   Minutes    Critical Care Provided     Minutes non procedure based      Comments    x Reviewed previous records   >50% of visit spent in counseling and coordination of care x Discussion with patient and/or family and questions answered       Given the patient's current clinical presentation, I have a high level of concern for decompensation if discharged from the ED. Complex decision making was performed which includes reviewing the patient's available past medical records, laboratory results, and Xray films.  I have also directly communicated my plan and discussed this case with the involved ED physician.     ____________________________________________________________________  Van Longoria MD  Telehospitalist     Procedures: see electronic medical records for all procedures/Xrays and details which were not copied into this note but were reviewed prior to creation of Plan. LAB DATA REVIEWED:    Recent Results (from the past 24 hour(s))   EKG, 12 LEAD, INITIAL    Collection Time: 07/30/20  2:02 AM   Result Value Ref Range    Ventricular Rate 68 BPM    Atrial Rate 68 BPM    P-R Interval 480 ms    QRS Duration 122 ms    Q-T Interval 396 ms    QTC Calculation (Bezet) 421 ms    Calculated R Axis 61 degrees    Calculated T Axis -7 degrees    Diagnosis       Sinus rhythm with 1st degree AV block with frequent premature ventricular   complexes  Right bundle branch block  T wave abnormality, consider inferior ischemia  When compared with ECG of 04-JUN-2017 14:52,  Right bundle branch block is now present  Minimal criteria for Anterior infarct are no longer present     CBC WITH AUTOMATED DIFF    Collection Time: 07/30/20  2:10 AM   Result Value Ref Range    WBC 7.1 4.1 - 11.1 K/uL    RBC 3.85 (L) 4.10 - 5.70 M/uL    HGB 11.9 (L) 12.1 - 17.0 g/dL    HCT 36.8 36.6 - 50.3 %    MCV 95.6 80.0 - 99.0 FL    MCH 30.9 26.0 - 34.0 PG    MCHC 32.3 30.0 - 36.5 g/dL    RDW 14.3 11.5 - 14.5 %    PLATELET 721 275 - 817 K/uL    MPV 11.2 8.9 - 12.9 FL    NRBC 0.0 0  WBC    ABSOLUTE NRBC 0.00 0.00 - 0.01 K/uL    NEUTROPHILS 58 32 - 75 %    LYMPHOCYTES 28 12 - 49 %    MONOCYTES 11 5 - 13 %    EOSINOPHILS 3 0 - 7 %    BASOPHILS 0 0 - 1 %    IMMATURE GRANULOCYTES 0 0.0 - 0.5 %    ABS. NEUTROPHILS 4.1 1.8 - 8.0 K/UL    ABS. LYMPHOCYTES 2.0 0.8 - 3.5 K/UL    ABS. MONOCYTES 0.8 0.0 - 1.0 K/UL    ABS. EOSINOPHILS 0.2 0.0 - 0.4 K/UL    ABS. BASOPHILS 0.0 0.0 - 0.1 K/UL    ABS. IMM.  GRANS. 0.0 0.00 - 0.04 K/UL    DF AUTOMATED     METABOLIC PANEL, BASIC    Collection Time: 07/30/20  3:14 AM   Result Value Ref Range    Sodium 138 136 - 145 mmol/L    Potassium 4.3 3.5 - 5.1 mmol/L    Chloride 109 (H) 97 - 108 mmol/L    CO2 21 21 - 32 mmol/L    Anion gap 8 5 - 15 mmol/L Glucose 96 65 - 100 mg/dL    BUN 34 (H) 6 - 20 MG/DL    Creatinine 1.82 (H) 0.70 - 1.30 MG/DL    BUN/Creatinine ratio 19 12 - 20      GFR est AA 43 (L) >60 ml/min/1.73m2    GFR est non-AA 35 (L) >60 ml/min/1.73m2    Calcium 8.9 8.5 - 10.1 MG/DL   TROPONIN I    Collection Time: 07/30/20  3:14 AM   Result Value Ref Range    Troponin-I, Qt. <0.05 <0.05 ng/mL   NT-PRO BNP    Collection Time: 07/30/20  3:14 AM   Result Value Ref Range    NT pro-BNP 6,962 (H) <450 PG/ML   MAGNESIUM    Collection Time: 07/30/20  3:14 AM   Result Value Ref Range    Magnesium 2.5 (H) 1.6 - 2.4 mg/dL   PHOSPHORUS    Collection Time: 07/30/20  3:14 AM   Result Value Ref Range    Phosphorus 3.4 2.6 - 4.7 MG/DL

## 2020-07-30 NOTE — PROGRESS NOTES
Bedside shift change report given to Flex Almaguer (oncoming nurse) by Efren Alexis (offgoing nurse). Report included the following information SBAR, Kardex, Intake/Output, MAR, Accordion and Recent Results. Admitted from ED and resumed care. Pt denied pain. Ambulated to bathroom well with son in the room with him. Accepted all meds. Plan to transfer to [facility] or [facility] for planned Pacemaker placement tomorrow.

## 2020-07-30 NOTE — PROGRESS NOTES
TRANSFER - IN REPORT:    Verbal report received from Yamile(name) on OLESYA Rao  being received from ED(unit) for Observation      Report consisted of patients Situation, Background, Assessment and   Recommendations(SBAR). Information from the following report(s) SBAR, Kardex, ED Summary, Intake/Output, MAR, Accordion and Recent Results was reviewed with the receiving nurse. Opportunity for questions and clarification was provided. Assessment completed upon patients arrival to unit and care assumed.

## 2020-07-30 NOTE — CONSULTS
[address] [facility]     Date of  Admission: 7/30/2020  1:50 AM     Admission type:Emergency    Consult for: CHF  Consult by: Dr. Green Party     Subjective:     Kiersten Olson is a 80 y.o. male with a PMH significant for sinus bradycardia with 1st degree AVB, HLD, GERD, 2:1 AVB admitted for Congestive heart failure ([facility] [address] [I50.9]. Per ED provider note, the patient presented with complaints of chest pressure since 3pm yesterday afternoon, some weakness and dyspnea on exertion. EMS reported the patient was in AFIB, which the patient reported was new for him. Upon assessment, the patient states he left the dentist yesterday afternoon and felt increasing chest tightness and SOB. He states he received no medications at the dental office. He states he took his BP around 3pm and found his systolic to be 710, and he states this is unusually high for him. He took repeat measurements and found they were all high readings. He denies any continued SOB, chest tightness, leg edema, syncope, near syncope, lightheadedness, dizziness, diaphoresis, or leg claudication. He does endorse a headache. He also denies any recent fever, chills, or exposure to ill persons. He is an established patient of Dr. Eliane Dunn last seen 3/2020. Multiple prior EKG's show SB with significant 1st degree and RBBB. EKG this admission shows  Sinus rhythm with complete heart block Right BBB Nonspecific ST and T wave abnormality. Echo this admission shows LV: Normal cavity size. Mild concentric hypertrophy. Mild-to-moderate systolic function. Estimated left ventricular ejection fraction is 55 - 60%. Moderately dilated right atrium. MR and mod. PHTN       Cardiac risk factors: dyslipidemia, sedentary life style, male gender, hypertension, stress.     Patient Active Problem List    Diagnosis Date Noted    Congestive heart failure ([facility] [address] 07/30/2020    Bilateral carotid artery stenosis 07/18/2019    Essential hypertension 07/18/2019    Hypercholesterolemia 06/17/2019    Mobitz type 1 second degree AV block 08/31/2017    PVC (premature ventricular contraction) 08/29/2017    Bradycardia 08/29/2017    AV block, 1st degree 08/29/2017    Right groin pain 07/09/2013      John Guajardo MD  Past Medical History:   Diagnosis Date    Abdominal pain     Dyspepsia and other specified disorders of function of stomach     GERD (gastroesophageal reflux disease)     Hypercholesterolemia     Thyroid disease       Social History     Socioeconomic History    Marital status:      Spouse name: Not on file    Number of children: Not on file    Years of education: Not on file    Highest education level: Not on file   Tobacco Use    Smoking status: Never Smoker    Smokeless tobacco: Never Used   Substance and Sexual Activity    Alcohol use: Yes     Comment: social    Drug use: Unknown     Types: Prescription   Social History Narrative    ** Merged History Encounter **          Allergies   Allergen Reactions    Morphine Nausea and Vomiting    Morphine Nausea and Vomiting    Pcn [Penicillins] Rash and Swelling    Pcn [Penicillins] Rash      Family History   Problem Relation Age of Onset    Heart Disease Mother     Heart Disease Father       Current Facility-Administered Medications   Medication Dose Route Frequency    sodium chloride (NS) flush 5-40 mL  5-40 mL IntraVENous Q8H    sodium chloride (NS) flush 5-40 mL  5-40 mL IntraVENous PRN    acetaminophen (TYLENOL) tablet 650 mg  650 mg Oral Q6H PRN    Or    acetaminophen (TYLENOL) suppository 650 mg  650 mg Rectal Q6H PRN    polyethylene glycol (MIRALAX) packet 17 g  17 g Oral DAILY PRN    promethazine (PHENERGAN) tablet 12.5 mg  12.5 mg Oral Q6H PRN    Or    ondansetron (ZOFRAN) injection 4 mg  4 mg IntraVENous Q6H PRN    enoxaparin (LOVENOX) injection 30 mg  30 mg SubCUTAneous DAILY    levothyroxine (SYNTHROID) tablet 100 mcg  100 mcg Oral ACB    furosemide (LASIX) injection 40 mg  40 mg IntraVENous DAILY    hydrALAZINE (APRESOLINE) tablet 50 mg  50 mg Oral BID        Review of Symptoms:   11 systems reviewed, negative other than as stated in the HPI        Objective:      Visit Vitals  /57 (BP 1 Location: Left arm, BP Patient Position: At rest;Supine)   Pulse (!) 55   Temp 97.7 °F (36.5 °C)   Resp 18   Ht 5' 4\" (1.626 m)   Wt 81.2 kg (179 lb)   SpO2 96%   BMI 30.73 kg/m²       Physical:   General: pleasant, well-nourished elderly  male in NAD  Heart: irregular, no m/S3/JVD, no carotid bruits   Lungs: clear diminished   Abdomen: Soft, +BS, NTND   Extremities: LE matt +DP/PT, 1-2+ edema   Neurologic: Grossly normal, good historian, A x O x 3    Data Review:   Recent Labs     07/30/20  0210   WBC 7.1   HGB 11.9*   HCT 36.8        Recent Labs     07/30/20  0314      K 4.3   *   CO2 21   GLU 96   BUN 34*   CREA 1.82*   CA 8.9   MG 2.5*   PHOS 3.4       Recent Labs     07/30/20  1040 07/30/20  0314   TROIQ 0.05* <0.05     --        No intake or output data in the 24 hours ending 07/30/20 1538     Cardiographics    Telemetry: upon tele. Review variable high grade block HR upper 30's. Sometimes 1 degree AVB, 2:1 AVB, PVC's, BBB  ECG: SR with 1 degree AVB, RBBB, CHB   Echocardiogram:   ·  LV: Normal cavity size. Mild concentric hypertrophy. Mild-to-moderate systolic function. Estimated left ventricular ejection fraction is 55 - 60%. · RA: Moderately dilated right atrium. · MV: Mitral valve thickening. Mild mitral valve regurgitation is present. · PA: Mild to moderate pulmonary hypertension.   CXRAY: \"no acute cardiopulmonary process\"        Assessment:       Active Problems:    PVC (premature ventricular contraction) (8/29/2017)      Mobitz type 1 second degree AV block (8/31/2017)      Hypercholesterolemia (6/17/2019)      Essential hypertension (7/18/2019)      Congestive heart failure ([address] (7/30/2020)         Plan:   Salvador Alarcon Elvin Diaz is a 80 y.o. male with a PMH significant for sinus bradycardia with 1st degree AVB, HLD, GERD, 2:1 AVB, hypothyroidism admitted for Congestive heart failure ([facility] [address] [I50.9]. Cardiology consulted for possible CHF. Trop(-), NT-pro BNP 6,962, Cr 1.82 (baseline 1.62), K 4.3, Mg 2.5, WBC 7.1, H/H 11.9/36.8. Diastolic CHF exacerbation/HFpEF:EF 55-60% NT pro-BNP 6,962  -Repeat ECHO reviewed  -Transfer to [address] [place] [address]  -Continue to monitor tele. -Continue 40 mg IV lasix  -Strict I/O, daily weights  -Venous duplexes (-)    HTN: improving   -conitnue lasix, restart hydralazine PO    GERD:   -Manage per internal medicine      Acute on chronic CKD stage 3: baseline 1.62, at 1.82 currently.   -monitor daily BMP, Strict I/O    High grade AV Block:  -Transfer to [address] [place] [address]  -Continue to monitor tele. -Avoid negative chronotropic agents  -Monitor labs  -Check TSH   -EP consult for potential PPM  -NPO after midnight    Thank you for the opportunity to partner in the care of this patient.        Guanako Buckner, PURVI   MSN, RN,AG-ACNP-BC

## 2020-07-30 NOTE — PROGRESS NOTES
Pharmacy Medication Reconciliation     The patient was interviewed regarding current PTA medication list, use and drug allergies; Patient present in room and obtained permission from patient to discuss drug regimen with visitor(s) present. The patient was questioned regarding use of any other inhalers, topical products, over the counter medications, herbal medications, vitamin products or ophthalmic/nasal/otic medication use. Allergy Update: Morphine; Morphine; Pcn [penicillins]; and Pcn [penicillins]    Recommendations/Findings: The following amendments were made to the patient's active medication list on file at [facility]:   1) Additions: omeprazole 20 mg     2) Deletions: hydrocortisone valerate 0.2% topical cream    3) Changes: hydralazine 50 mg BID --> hydralazine 25mg BID      Pertinent Findings:   -Clarified PTA med list with patient. PTA medication list was corrected to the following:     Prior to Admission Medications   Prescriptions Last Dose Informant Taking?   colestipol (COLESTID) 1 gram tablet 2020 at Unknown time  Yes   Sig: Take 1 g by mouth two (2) times a day. Separate 1-2 hours from other drugs. Max: 4 tabs   hydrALAZINE (APRESOLINE) 25 mg tablet 2020 at Unknown time  Yes   Sig: Take 25 mg by mouth two (2) times a day. hydroCHLOROthiazide (HYDRODIURIL) 12.5 mg tablet 2020 at Unknown time  Yes   Si tab by mouth daily   levothyroxine (SYNTHROID) 100 mcg tablet 2020 at 0800  Yes   Sig: Take 100 mcg by mouth daily (before breakfast). omeprazole (PRILOSEC) 20 mg capsule 2020 at Unknown time  Yes   Sig: Take 20 mg by mouth daily.       Facility-Administered Medications: None          Thank you,  Debbie Ng

## 2020-07-30 NOTE — ED PROVIDER NOTES
EMERGENCY DEPARTMENT HISTORY AND PHYSICAL EXAM      Date: 7/30/2020  Patient Name: Matt Burnham    History of Presenting Illness     Chief Complaint   Patient presents with    Chest Pain     Patient feeling chest pressure since 3 this afternoon, some weakness and dyspnea on exertion. EMS reports patient in a fib, per patient this is new for him; he's had irregularities before but never diagnosed with a fib. History Provided By: Patient    HPI: Matt Burnham, 80 y.o. male  Medical history of GERD presenting today with chest pressure. The patient notes that he started having chest pressure at around 3:00 this afternoon. He took an 81 mg aspirin earlier today, and he tried Tylenol but this did not improve his symptoms. He continued having symptoms and was feeling dyspnea on exertion as well as weakness. He decided to call paramedics and they instructed him to take 324 mg of aspirin. He notes that the pain is been constant since its initiation. He denies any previous history of arrhythmia. No recent fever, cough, chills. He notes that the pain is moderate in severity and nonradiating. There are no other complaints, changes, or physical findings at this time. PCP: Julia Barrett MD    No current facility-administered medications on file prior to encounter. Current Outpatient Medications on File Prior to Encounter   Medication Sig Dispense Refill    hydroCHLOROthiazide (HYDRODIURIL) 12.5 mg tablet 1 tab by mouth daily 90 Tab 1    hydrALAZINE (APRESOLINE) 50 mg tablet Take 1 Tab by mouth two (2) times a day. (Patient taking differently: Take 25 mg by mouth two (2) times a day.) 60 Tab 5    hydrocortisone valerate (WESTCORT) 0.2 % topical cream APPLY TO AFFECTED AREA DAILY AS NEEDED  1    colestipol (COLESTID) 1 gram tablet Take 1 g by mouth two (2) times a day.  levothyroxine (SYNTHROID) 100 mcg tablet Take 100 mcg by mouth daily (before breakfast).          Past History     Past Medical History:  Past Medical History:   Diagnosis Date    Abdominal pain     Dyspepsia and other specified disorders of function of stomach     GERD (gastroesophageal reflux disease)     Hypercholesterolemia     Thyroid disease        Past Surgical History:  Past Surgical History:   Procedure Laterality Date    HX CATARACT REMOVAL      bilateral    HX CATARACT REMOVAL      HX CHOLECYSTECTOMY      HX GI  2010    colon surgery?  HX HERNIA REPAIR      HX OPEN CHOLECYSTECTOMY      HX ORTHOPAEDIC      ingrown left toenail-great toe    HX OTHER SURGICAL      colonoscopy    HX TONSILLECTOMY         Family History:  Family History   Problem Relation Age of Onset    Heart Disease Mother     Heart Disease Father        Social History:  Social History     Tobacco Use    Smoking status: Never Smoker    Smokeless tobacco: Never Used   Substance Use Topics    Alcohol use: Yes     Comment: social    Drug use: Unknown     Types: Prescription       Allergies: Allergies   Allergen Reactions    Morphine Nausea and Vomiting    Morphine Nausea and Vomiting    Pcn [Penicillins] Rash and Swelling    Pcn [Penicillins] Rash         Review of Systems   Constitutional: No  fever  Skin: No  rash  HEENT: No  nasal congestion  Resp: No cough  CV: + chest pain  GI: No vomiting  : No dysuria  MSK: No joint pain  Neuro: No numbness  Psych: No suicidal      Physical Exam     Patient Vitals for the past 12 hrs:   Temp Pulse Resp BP SpO2   07/30/20 0515 97.4 °F (36.3 °C) (!) 57 19 154/58 97 %   07/30/20 0500  (!) 54 23 143/61 96 %   07/30/20 0445  (!) 51 21 144/69 97 %   07/30/20 0430  (!) 57 25 145/63 98 %   07/30/20 0345  (!) 57 21 148/57 96 %   07/30/20 0315  (!) 59 23 123/60 95 %   07/30/20 0230  (!) 58 19 150/53 95 %   07/30/20 0215  62 18 156/56 96 %   07/30/20 0157 97.3 °F (36.3 °C) 68 20 169/62 98 %     General: alert, No acute distress  Eyes: EOMI, normal conjunctiva  ENT: moist mucous membranes.   Neck: Active, full ROM of neck. Skin: No rashes. no jaundice              Lungs: Equal chest expansion. no respiratory distress. clear to auscultation bilaterally No accessory muscle usage  Heart: regular rate     2+ pitting edema bilaterally   2+ radial pulses and DPs bilaterally  Abd:  non distended soft, nontender. No rebound tenderness. No guarding  Back: Full ROM  MSK: Full, active ROM in all 4 extremities. Neuro: Person, Place, Time and Situation; normal speech;   Psych: Cooperative with exam; Appropriate mood and affect             Diagnostic Study Results     Labs -     Recent Results (from the past 12 hour(s))   EKG, 12 LEAD, INITIAL    Collection Time: 07/30/20  2:02 AM   Result Value Ref Range    Ventricular Rate 68 BPM    Atrial Rate 68 BPM    P-R Interval 480 ms    QRS Duration 122 ms    Q-T Interval 396 ms    QTC Calculation (Bezet) 421 ms    Calculated R Axis 61 degrees    Calculated T Axis -7 degrees    Diagnosis       Sinus rhythm with 1st degree AV block with frequent premature ventricular   complexes  Right bundle branch block  T wave abnormality, consider inferior ischemia  When compared with ECG of 04-JUN-2017 14:52,  Right bundle branch block is now present  Minimal criteria for Anterior infarct are no longer present     CBC WITH AUTOMATED DIFF    Collection Time: 07/30/20  2:10 AM   Result Value Ref Range    WBC 7.1 4.1 - 11.1 K/uL    RBC 3.85 (L) 4.10 - 5.70 M/uL    HGB 11.9 (L) 12.1 - 17.0 g/dL    HCT 36.8 36.6 - 50.3 %    MCV 95.6 80.0 - 99.0 FL    MCH 30.9 26.0 - 34.0 PG    MCHC 32.3 30.0 - 36.5 g/dL    RDW 14.3 11.5 - 14.5 %    PLATELET 262 507 - 515 K/uL    MPV 11.2 8.9 - 12.9 FL    NRBC 0.0 0  WBC    ABSOLUTE NRBC 0.00 0.00 - 0.01 K/uL    NEUTROPHILS 58 32 - 75 %    LYMPHOCYTES 28 12 - 49 %    MONOCYTES 11 5 - 13 %    EOSINOPHILS 3 0 - 7 %    BASOPHILS 0 0 - 1 %    IMMATURE GRANULOCYTES 0 0.0 - 0.5 %    ABS. NEUTROPHILS 4.1 1.8 - 8.0 K/UL    ABS. LYMPHOCYTES 2.0 0.8 - 3.5 K/UL    ABS. MONOCYTES 0.8 0.0 - 1.0 K/UL    ABS. EOSINOPHILS 0.2 0.0 - 0.4 K/UL    ABS. BASOPHILS 0.0 0.0 - 0.1 K/UL    ABS. IMM. GRANS. 0.0 0.00 - 0.04 K/UL    DF AUTOMATED     METABOLIC PANEL, BASIC    Collection Time: 07/30/20  3:14 AM   Result Value Ref Range    Sodium 138 136 - 145 mmol/L    Potassium 4.3 3.5 - 5.1 mmol/L    Chloride 109 (H) 97 - 108 mmol/L    CO2 21 21 - 32 mmol/L    Anion gap 8 5 - 15 mmol/L    Glucose 96 65 - 100 mg/dL    BUN 34 (H) 6 - 20 MG/DL    Creatinine 1.82 (H) 0.70 - 1.30 MG/DL    BUN/Creatinine ratio 19 12 - 20      GFR est AA 43 (L) >60 ml/min/1.73m2    GFR est non-AA 35 (L) >60 ml/min/1.73m2    Calcium 8.9 8.5 - 10.1 MG/DL   TROPONIN I    Collection Time: 07/30/20  3:14 AM   Result Value Ref Range    Troponin-I, Qt. <0.05 <0.05 ng/mL   NT-PRO BNP    Collection Time: 07/30/20  3:14 AM   Result Value Ref Range    NT pro-BNP 6,962 (H) <450 PG/ML   MAGNESIUM    Collection Time: 07/30/20  3:14 AM   Result Value Ref Range    Magnesium 2.5 (H) 1.6 - 2.4 mg/dL   PHOSPHORUS    Collection Time: 07/30/20  3:14 AM   Result Value Ref Range    Phosphorus 3.4 2.6 - 4.7 MG/DL       Radiologic Studies -   XR CHEST PORT   Final Result   IMPRESSION: No acute cardiopulmonary process. .        CT Results  (Last 48 hours)    None        CXR Results  (Last 48 hours)               07/30/20 0244  XR CHEST PORT Final result    Impression:  IMPRESSION: No acute cardiopulmonary process. .       Narrative:  EXAM: XR CHEST PORT       INDICATION: chest pain       COMPARISON: 10/22/2010       FINDINGS: A portable AP radiograph of the chest was obtained at 234 hours. The   patient is on a cardiac monitor. Left chest wall deformity is again noted. The   cardiac and mediastinal contours and pulmonary vascularity are normal.    Scoliosis. .                  Medical Decision Making   I am the first provider for this patient.     I reviewed the vital signs, available nursing notes, past medical history, past surgical history, family history and social history. Records Reviewed: Patient has had a previous history of first-degree AV block with multiple PVCs similar to his EKG today. Vital Signs-Reviewed the patient's vital signs. Patient Vitals for the past 12 hrs:   Temp Pulse Resp BP SpO2   07/30/20 0515 97.4 °F (36.3 °C) (!) 57 19 154/58 97 %   07/30/20 0500  (!) 54 23 143/61 96 %   07/30/20 0445  (!) 51 21 144/69 97 %   07/30/20 0430  (!) 57 25 145/63 98 %   07/30/20 0345  (!) 57 21 148/57 96 %   07/30/20 0315  (!) 59 23 123/60 95 %   07/30/20 0230  (!) 58 19 150/53 95 %   07/30/20 0215  62 18 156/56 96 %   07/30/20 0157 97.3 °F (36.3 °C) 68 20 169/62 98 %       Pulse Oximetry Analysis - 98% on room air    Cardiac Monitor:   Rate: 68 bpm  Rhythm: Normal Sinus Rhythm with multiple PVCs    Provider Notes (Medical Decision Making):     Differential Diagnosis: Arrhythmia, electrolyte arrangement, ACS, pneumonia, pneumothorax    Initial Plan: Labs, EKG, chest x-ray, cardiac BNP and reassess. The patient does have an irregular heart rhythm, but he has had a previous history of sinus rhythm with a first-degree AV block and frequent PVCs, and this is what his EKG shows today. He does have some T wave inversions which appear new, this is concerning given his chest pressure. Will reassess after work-up. ED Course:   Initial assessment performed. The patients presenting problems have been discussed, and they are in agreement with the care plan formulated and outlined with them. I have encouraged them to ask questions as they arise throughout their visit. ED Course as of Jul 30 0704   Thu Jul 30, 2020   0405 Upon review of the patient's records the last echo was done in 6/25/2019 and EF was normal.    [NW]   0409 On my interpretation of the patient's laboratory studies there is a elevation in the cardiac BNP of 6962, troponin is negative, metabolic panel shows elevated creatinine at 1.82, CBC unremarkable.     [NW]   0409 On my interpretation of the patient's chest x-ray there is no evidence of infiltrate.    [NW]   0410 On my interpretation the patient's EKG sinus rhythm at a rate of 68, first-degree AV block with a RI interval of 480, multiple PVCs, T wave inversions in leads III, aVF, V2, V3 that are new from previous EKG    [NW]   0411 Patient presents today with chest pressure, he is found to have multiple areas of T wave inversion on his EKG which are new from previous, also found to have an elevated creatinine and a cardiac BNP. He does not have any previous history of heart failure. He does have 2+ pitting edema to the knees bilaterally. No significant respiratory distress associated with this, but with the EKG changes, chest pressure, and new onset of heart failure will treat with IV Lasix, admit to the hospitalist service. [NW]      ED Course User Index  [NW] Corinne Slade MD       I, Juan Santa MD, am the attending of record for this patient encounter. Disposition: Admitted    Admission Note:  Patient is being admitted to the hospital by Service: Hospitalist.  The results of their tests and reasons for their admission have been discussed with them and available family. They convey agreement and understanding for the need to be admitted and for their admission diagnosis. Diagnosis     Clinical Impression:   1. Acute congestive heart failure, unspecified heart failure type ([address]    2. Peripheral edema    3. Chest pressure    4. T wave inversion in EKG        Attestations:    Juan Santa MD    Please note that this dictation was completed with [employer], the computer voice recognition software. Quite often unanticipated grammatical, syntax, homophones, and other interpretive errors are inadvertently transcribed by the computer software. Please disregard these errors. Please excuse any errors that have escaped final proofreading. Thank you.

## 2020-07-30 NOTE — PROGRESS NOTES
CM Consult Noted-   10:16 AM- CM attempted to meet with pt regarding initial assessment and observation information- pt off the floor at echo. CM will follow up once he arrives back.      MAYA Catherine, [address] [place] [address]   [phone]

## 2020-07-30 NOTE — CARDIO/PULMONARY
Cardiac Rehab Note: chart review Consult has been acknowledged CP, CHF 
 
EF 55-60%  on 7/30/20 per echo Smoking history assessed. Patient is a non smoker. Smoking Cessation Program link has not been added to the AVS. Patient not seen at this time, cards consult ordered. CP Rehab will see as indicated.

## 2020-07-30 NOTE — H&P
Hospitalist Admission Note    NAME: Kenyon Rios   :  10/25/1928   MRN:  [id number]     Date/Time:  2020 3:47 PM    Patient PCP: Anupama Ch MD  ______________________________________________________________________  Given the patient's current clinical presentation, I have a high level of concern for decompensation if discharged from the emergency department. Complex decision making was performed, which includes reviewing the patient's available past medical records, laboratory results, and x-ray films. My assessment of this patient's clinical condition and my plan of care is as follows. Assessment / Plan:  Acute on chronic diastolic CHF  We will admit under telemetry, will continue with IV Lasix 40 mg daily, daily weight I's and O's  Repeat 2D echo showed EF of 55%    Complete heart block seen on the EKG  Plan for pacemaker tomorrow  Keep NPO after MN   Appreciate cardiology input  Avoid AV blocker    Uncontrolled hypertension  Resume hydralazine  PRN hydralazine    GERD  Restart Protonix  Code Status: Full code  Surrogate Decision Maker:    DVT Prophylaxis: Lovenox   GI Prophylaxis: not indicated    Baseline: ambulatory       Subjective:   CHIEF COMPLAINT: Chest tightness and shortness of breath    HISTORY OF PRESENT ILLNESS:     OLESYA Ospina is a 80 y.o.  male with past medical history of GERD, hyperlipidemia, thyroid disease who presents with shortness of breath and chest tightness started yesterday associated with increasing lower extremity edema. He also reported that his blood pressure was noted to be elevated  Patient reported compliance to his medication  In the ED, his blood pressure was elevated, was bradycardic. Review of his labs are unremarkable except for elevated proBNP  Initial troponin less than 0.05.   EKG showedSinus rhythm with complete heart block , Right bundle branch block    Patient denies any current chest pain reported feeling better      We were asked to admit for work up and evaluation of the above problems. Past Medical History:   Diagnosis Date    Abdominal pain     Dyspepsia and other specified disorders of function of stomach     GERD (gastroesophageal reflux disease)     Hypercholesterolemia     Thyroid disease         Past Surgical History:   Procedure Laterality Date    HX CATARACT REMOVAL      bilateral    HX CATARACT REMOVAL      HX CHOLECYSTECTOMY      HX GI  2010    colon surgery?  HX HERNIA REPAIR      HX OPEN CHOLECYSTECTOMY      HX ORTHOPAEDIC      ingrown left toenail-great toe    HX OTHER SURGICAL      colonoscopy    HX TONSILLECTOMY         Social History     Tobacco Use    Smoking status: Never Smoker    Smokeless tobacco: Never Used   Substance Use Topics    Alcohol use: Yes     Comment: social        Family History   Problem Relation Age of Onset    Heart Disease Mother     Heart Disease Father      Allergies   Allergen Reactions    Morphine Nausea and Vomiting    Morphine Nausea and Vomiting    Pcn [Penicillins] Rash and Swelling    Pcn [Penicillins] Rash        Prior to Admission medications    Medication Sig Start Date End Date Taking? Authorizing Provider   hydrALAZINE (APRESOLINE) 25 mg tablet Take 25 mg by mouth two (2) times a day. Yes Provider, Historical   omeprazole (PRILOSEC) 20 mg capsule Take 20 mg by mouth daily. Yes Provider, Historical   hydroCHLOROthiazide (HYDRODIURIL) 12.5 mg tablet 1 tab by mouth daily 2/27/20  Yes Lia Curran MD   colestipol (COLESTID) 1 gram tablet Take 1 g by mouth two (2) times a day. Separate 1-2 hours from other drugs. Max: 4 tabs   Yes Provider, Historical   levothyroxine (SYNTHROID) 100 mcg tablet Take 100 mcg by mouth daily (before breakfast). Yes Provider, Historical       REVIEW OF SYSTEMS:     I am not able to complete the review of systems because:    The patient is intubated and sedated    The patient has altered mental status due to his acute medical problems    The patient has baseline aphasia from prior stroke(s)    The patient has baseline dementia and is not reliable historian    The patient is in acute medical distress and unable to provide information           Total of 12 systems reviewed as follows:       POSITIVE= underlined text  Negative = text not underlined  General:  fever, chills, sweats, generalized weakness, weight loss/gain,      loss of appetite   Eyes:    blurred vision, eye pain, loss of vision, double vision  ENT:    rhinorrhea, pharyngitis   Respiratory:   cough, sputum production, SOB, ALANIS, wheezing, pleuritic pain   Cardiology:   chest pain, palpitations, orthopnea, PND, edema, syncope   Gastrointestinal:  abdominal pain , N/V, diarrhea, dysphagia, constipation, bleeding   Genitourinary:  frequency, urgency, dysuria, hematuria, incontinence   Muskuloskeletal :  arthralgia, myalgia, back pain  Hematology:  easy bruising, nose or gum bleeding, lymphadenopathy   Dermatological: rash, ulceration, pruritis, color change / jaundice  Endocrine:   hot flashes or polydipsia   Neurological:  headache, dizziness, confusion, focal weakness, paresthesia,     Speech difficulties, memory loss, gait difficulty  Psychological: Feelings of anxiety, depression, agitation    Objective:   VITALS:    Visit Vitals  /57 (BP 1 Location: Left arm, BP Patient Position: At rest)   Pulse (!) 44   Temp 97.9 °F (36.6 °C)   Resp 18   Ht 5' 4\" (1.626 m)   Wt 81.2 kg (179 lb)   SpO2 96%   BMI 30.73 kg/m²       PHYSICAL EXAM:    General:    Alert, cooperative, no distress, appears stated age. HEENT: Atraumatic, anicteric sclerae, pink conjunctivae     No oral ulcers, mucosa moist, throat clear, dentition fair  Neck:  Supple, symmetrical,  thyroid: non tender  Lungs:   Crackles at the bases. No Wheezing or Rhonchi. No rales. Chest wall:  No tenderness  No Accessory muscle use.   Heart:   Regular  rhythm,  No  murmur 2+ lower extremity edema   abdomen:   Soft, non-tender. Not distended. Bowel sounds normal  Extremities: No cyanosis. No clubbing,      Skin turgor normal, Capillary refill normal, Radial dial pulse 2+  Skin:     Not pale. Not Jaundiced  No rashes   Psych:  Good insight. Not depressed. Not anxious or agitated. Neurologic: EOMs intact. No facial asymmetry. No aphasia or slurred speech. Symmetrical strength, Sensation grossly intact. Alert and oriented X 4.     _______________________________________________________________________  Care Plan discussed with:    Comments   Patient x    Family  x  son at bedside   RN x    Care Manager                    Consultant:      _______________________________________________________________________  Expected  Disposition:   Home with Family x   HH/PT/OT/RN    SNF/LTC    CHRISTIAN    ________________________________________________________________________  TOTAL TIME:  72 Minutes    Critical Care Provided     Minutes non procedure based      Comments    x Reviewed previous records   >50% of visit spent in counseling and coordination of care x Discussion with patient and/or family and questions answered       ________________________________________________________________________  Signed: Leny Best MD    Procedures: see electronic medical records for all procedures/Xrays and details which were not copied into this note but were reviewed prior to creation of Plan.     LAB DATA REVIEWED:    Recent Results (from the past 24 hour(s))   EKG, 12 LEAD, INITIAL    Collection Time: 07/30/20  2:02 AM   Result Value Ref Range    Ventricular Rate 68 BPM    Atrial Rate 68 BPM    P-R Interval 480 ms    QRS Duration 122 ms    Q-T Interval 396 ms    QTC Calculation (Bezet) 421 ms    Calculated R Axis 61 degrees    Calculated T Axis -7 degrees    Diagnosis       Sinus rhythm with complete heart block  Right bundle branch block  Nonspecific ST and T wave abnormality  When compared with ECG of 04-JUN-2017 14:52,  Right bundle branch block is now present    Confirmed by Duy Fernandes MD, Sushma Lux ([id number]) on 7/30/2020 9:17:18 AM     CBC WITH AUTOMATED DIFF    Collection Time: 07/30/20  2:10 AM   Result Value Ref Range    WBC 7.1 4.1 - 11.1 K/uL    RBC 3.85 (L) 4.10 - 5.70 M/uL    HGB 11.9 (L) 12.1 - 17.0 g/dL    HCT 36.8 36.6 - 50.3 %    MCV 95.6 80.0 - 99.0 FL    MCH 30.9 26.0 - 34.0 PG    MCHC 32.3 30.0 - 36.5 g/dL    RDW 14.3 11.5 - 14.5 %    PLATELET 272 754 - 334 K/uL    MPV 11.2 8.9 - 12.9 FL    NRBC 0.0 0  WBC    ABSOLUTE NRBC 0.00 0.00 - 0.01 K/uL    NEUTROPHILS 58 32 - 75 %    LYMPHOCYTES 28 12 - 49 %    MONOCYTES 11 5 - 13 %    EOSINOPHILS 3 0 - 7 %    BASOPHILS 0 0 - 1 %    IMMATURE GRANULOCYTES 0 0.0 - 0.5 %    ABS. NEUTROPHILS 4.1 1.8 - 8.0 K/UL    ABS. LYMPHOCYTES 2.0 0.8 - 3.5 K/UL    ABS. MONOCYTES 0.8 0.0 - 1.0 K/UL    ABS. EOSINOPHILS 0.2 0.0 - 0.4 K/UL    ABS. BASOPHILS 0.0 0.0 - 0.1 K/UL    ABS. IMM.  GRANS. 0.0 0.00 - 0.04 K/UL    DF AUTOMATED     METABOLIC PANEL, BASIC    Collection Time: 07/30/20  3:14 AM   Result Value Ref Range    Sodium 138 136 - 145 mmol/L    Potassium 4.3 3.5 - 5.1 mmol/L    Chloride 109 (H) 97 - 108 mmol/L    CO2 21 21 - 32 mmol/L    Anion gap 8 5 - 15 mmol/L    Glucose 96 65 - 100 mg/dL    BUN 34 (H) 6 - 20 MG/DL    Creatinine 1.82 (H) 0.70 - 1.30 MG/DL    BUN/Creatinine ratio 19 12 - 20      GFR est AA 43 (L) >60 ml/min/1.73m2    GFR est non-AA 35 (L) >60 ml/min/1.73m2    Calcium 8.9 8.5 - 10.1 MG/DL   TROPONIN I    Collection Time: 07/30/20  3:14 AM   Result Value Ref Range    Troponin-I, Qt. <0.05 <0.05 ng/mL   NT-PRO BNP    Collection Time: 07/30/20  3:14 AM   Result Value Ref Range    NT pro-BNP 6,962 (H) <450 PG/ML   MAGNESIUM    Collection Time: 07/30/20  3:14 AM   Result Value Ref Range    Magnesium 2.5 (H) 1.6 - 2.4 mg/dL   PHOSPHORUS    Collection Time: 07/30/20  3:14 AM   Result Value Ref Range    Phosphorus 3.4 2.6 - 4.7 MG/DL   ECHO ADULT COMPLETE Collection Time: 07/30/20 10:37 AM   Result Value Ref Range    IVSd 1.81 (A) 0.6 - 1.0 cm    LVIDd 3.85 (A) 4.2 - 5.9 cm    LVIDs 3.27 cm    LVOT d 1.54 cm    LVPWd 1.12 (A) 0.6 - 1.0 cm    LVOT Cardiac Output 2.91 liter/minute    LVOT Peak Gradient 5.32 mmHg    LVOT Peak Gradient 5.86 mmHg    Left Ventricular Outflow Tract Mean Gradient 2.25 mmHg    LVOT SV 48.2 mL    LVOT Peak Velocity 121.01 cm/s    LVOT Peak Velocity 115.34 cm/s    LVOT VTI 26.06 cm    RVSP 50.86 mmHg    Left Atrium Major Axis 3.82 cm    LA Area 4C 13.95 cm2    LA Vol 4C 28.83 18 - 58 mL    Left Atrium to Aortic Root Ratio 1.52     Right Atrial Area 4C 15.24 cm2    Est. RA Pressure 10.00 mmHg    AoV PG 5.42 mmHg    Pulmonic Regurgitant End Max Velocity 115.31 cm/s    Aortic Valve Area by Continuity of Peak Velocity 1.85 cm2    Aortic Valve Area by Continuity of Peak Velocity 1.94 cm2    MV A [place] 84.10 cm/s    Mitral Valve E Wave Deceleration Time 0.16 s    MV E [place] 99.48 cm/s    E/E' ratio (averaged) 11.70     E/E' lateral 9.59     E/E' septal 13.80     LV E' Lateral Velocity 10.37 cm/s    LV E' Septal Velocity 7.21 cm/s    MVA VTI 1.31 cm2    Pulmonic Regurgitant End Max Velocity 141.36 cm/s    MV Peak Gradient 5.55 mmHg    MV Mean Gradient 1.94 mmHg    Mitral Valve Max Velocity 117.77 cm/s    Mitral Valve Annulus Velocity Time Integral 36.81 cm    Triscuspid Valve Regurgitation Peak Gradient 40.86 mmHg    TR Max Velocity 319.12 cm/s    Ao Root D 3.55 cm    MV E/A 1.18     LV Mass .4 88 - 224 g    LV Mass AL Index 113.8 49 - 115 g/m2    Left Atrium Minor Axis 2.05 cm    LA Vol Index 15.45 16 - 28 ml/m2   URINALYSIS W/ REFLEX CULTURE    Collection Time: 07/30/20 10:40 AM    Specimen: Urine   Result Value Ref Range    Color YELLOW/STRAW      Appearance CLEAR CLEAR      Specific gravity 1.009 1.003 - 1.030      pH (UA) 5.0 5.0 - 8.0      Protein Negative NEG mg/dL    Glucose Negative NEG mg/dL    Ketone Negative NEG mg/dL    Bilirubin Negative NEG      Blood Negative NEG      Urobilinogen 0.2 0.2 - 1.0 EU/dL    Nitrites Negative NEG      Leukocyte Esterase Negative NEG      WBC 0-4 0 - 4 /hpf    RBC 0-5 0 - 5 /hpf    Epithelial cells FEW FEW /lpf    Bacteria Negative NEG /hpf    UA:UC IF INDICATED CULTURE NOT INDICATED BY UA RESULT CNI      Hyaline cast 0-2 0 - 5 /lpf   URIC ACID    Collection Time: 07/30/20 10:40 AM   Result Value Ref Range    Uric acid 7.5 (H) 3.5 - 7.2 MG/DL   CHLORIDE, URINE RANDOM    Collection Time: 07/30/20 10:40 AM   Result Value Ref Range    Chloride,urine random 151 MMOL/L   SODIUM, UR, RANDOM    Collection Time: 07/30/20 10:40 AM   Result Value Ref Range    Sodium,urine random 138 MMOL/L   CK    Collection Time: 07/30/20 10:40 AM   Result Value Ref Range     39 - 308 U/L   TROPONIN I    Collection Time: 07/30/20 10:40 AM   Result Value Ref Range    Troponin-I, Qt. 0.05 (H) <0.05 ng/mL

## 2020-07-30 NOTE — ED NOTES
Assumed care of patient via EMS. Patient reporting chest pressure that started about 3 PM yesterday and has persisted till now causing him to feel weak and SOB on exertion. Patient also having edema in lower extremities. Patient in no apparent distress at this time, on monitor x3 call bell in reach.

## 2020-07-30 NOTE — Clinical Note
TRANSFER - IN REPORT:     Verbal report received from: RN - [id number]. Report consisted of patient's Situation, Background, Assessment and   Recommendations(SBAR). Opportunity for questions and clarification was provided. Assessment completed upon patient's arrival to unit and care assumed. Patient transported with a Registered Nurse.

## 2020-07-30 NOTE — Clinical Note
TRANSFER - OUT REPORT:     Verbal report given to: [facility]. Report consisted of patient's Situation, Background, Assessment and   Recommendations(SBAR). Opportunity for questions and clarification was provided. Patient transported with a Registered Nurse. Patient transported to: [facility].

## 2020-07-30 NOTE — ED NOTES
TRANSFER - OUT REPORT:    Verbal report given to Emilie (name) on Génesis Head  being transferred to observation (unit) for routine progression of care       Report consisted of patients Situation, Background, Assessment and   Recommendations(SBAR). Information from the following report(s) SBAR, ED Summary, [facility] and Recent Results was reviewed with the receiving nurse. Lines:   Peripheral IV 07/30/20 Right Hand (Active)   Site Assessment Clean, dry, & intact 07/30/20 0214   Phlebitis Assessment 0 07/30/20 0214   Infiltration Assessment 0 07/30/20 0214   Dressing Status Clean, dry, & intact 07/30/20 0214   Dressing Type Tape;Transparent 07/30/20 0214   Hub Color/Line Status Patent;Pink;Capped;Flushed 07/30/20 0214   Action Taken Blood drawn 07/30/20 0214        Opportunity for questions and clarification was provided.

## 2020-07-31 ENCOUNTER — APPOINTMENT (OUTPATIENT)
Dept: GENERAL RADIOLOGY | Age: 85
DRG: 242 | End: 2020-07-31
Attending: INTERNAL MEDICINE
Payer: MEDICARE

## 2020-07-31 PROBLEM — I44.2 CHB (COMPLETE HEART BLOCK) (HCC): Status: ACTIVE | Noted: 2020-07-31

## 2020-07-31 LAB
ANION GAP SERPL CALC-SCNC: 5 MMOL/L (ref 5–15)
BASOPHILS # BLD: 0 K/UL (ref 0–0.1)
BASOPHILS NFR BLD: 0 % (ref 0–1)
BUN SERPL-MCNC: 36 MG/DL (ref 6–20)
BUN/CREAT SERPL: 19 (ref 12–20)
C3 SERPL-MCNC: 99 MG/DL (ref 82–167)
C4 SERPL-MCNC: 23 MG/DL (ref 14–44)
CALCIUM SERPL-MCNC: 8.6 MG/DL (ref 8.5–10.1)
CHLORIDE SERPL-SCNC: 106 MMOL/L (ref 97–108)
CO2 SERPL-SCNC: 27 MMOL/L (ref 21–32)
CREAT SERPL-MCNC: 1.9 MG/DL (ref 0.7–1.3)
DIFFERENTIAL METHOD BLD: ABNORMAL
EOSINOPHIL # BLD: 0.3 K/UL (ref 0–0.4)
EOSINOPHIL NFR BLD: 4 % (ref 0–7)
ERYTHROCYTE [DISTWIDTH] IN BLOOD BY AUTOMATED COUNT: 13.8 % (ref 11.5–14.5)
GLUCOSE SERPL-MCNC: 86 MG/DL (ref 65–100)
HCT VFR BLD AUTO: 37.8 % (ref 36.6–50.3)
HGB BLD-MCNC: 12.4 G/DL (ref 12.1–17)
IMM GRANULOCYTES # BLD AUTO: 0 K/UL (ref 0–0.04)
IMM GRANULOCYTES NFR BLD AUTO: 0 % (ref 0–0.5)
LYMPHOCYTES # BLD: 1.6 K/UL (ref 0.8–3.5)
LYMPHOCYTES NFR BLD: 25 % (ref 12–49)
MAGNESIUM SERPL-MCNC: 2.4 MG/DL (ref 1.6–2.4)
MCH RBC QN AUTO: 31.2 PG (ref 26–34)
MCHC RBC AUTO-ENTMCNC: 32.8 G/DL (ref 30–36.5)
MCV RBC AUTO: 95 FL (ref 80–99)
MONOCYTES # BLD: 0.7 K/UL (ref 0–1)
MONOCYTES NFR BLD: 10 % (ref 5–13)
NEUTS SEG # BLD: 4 K/UL (ref 1.8–8)
NEUTS SEG NFR BLD: 61 % (ref 32–75)
NRBC # BLD: 0 K/UL (ref 0–0.01)
NRBC BLD-RTO: 0 PER 100 WBC
PLATELET # BLD AUTO: 204 K/UL (ref 150–400)
PMV BLD AUTO: 11.1 FL (ref 8.9–12.9)
POTASSIUM SERPL-SCNC: 3.9 MMOL/L (ref 3.5–5.1)
RBC # BLD AUTO: 3.98 M/UL (ref 4.1–5.7)
SODIUM SERPL-SCNC: 138 MMOL/L (ref 136–145)
TSH SERPL DL<=0.05 MIU/L-ACNC: 0.75 UIU/ML (ref 0.36–3.74)
WBC # BLD AUTO: 6.7 K/UL (ref 4.1–11.1)

## 2020-07-31 PROCEDURE — 80048 BASIC METABOLIC PNL TOTAL CA: CPT

## 2020-07-31 PROCEDURE — 36415 COLL VENOUS BLD VENIPUNCTURE: CPT

## 2020-07-31 PROCEDURE — 74011250637 HC RX REV CODE- 250/637: Performed by: INTERNAL MEDICINE

## 2020-07-31 PROCEDURE — C1898 LEAD, PMKR, OTHER THAN TRANS: HCPCS | Performed by: INTERNAL MEDICINE

## 2020-07-31 PROCEDURE — 0JH606Z INSERTION OF PACEMAKER, DUAL CHAMBER INTO CHEST SUBCUTANEOUS TISSUE AND FASCIA, OPEN APPROACH: ICD-10-PCS | Performed by: INTERNAL MEDICINE

## 2020-07-31 PROCEDURE — 77030018729 HC ELECTRD DEFIB PAD CARD -B: Performed by: INTERNAL MEDICINE

## 2020-07-31 PROCEDURE — C1893 INTRO/SHEATH, FIXED,NON-PEEL: HCPCS | Performed by: INTERNAL MEDICINE

## 2020-07-31 PROCEDURE — 74011000250 HC RX REV CODE- 250: Performed by: INTERNAL MEDICINE

## 2020-07-31 PROCEDURE — 99153 MOD SED SAME PHYS/QHP EA: CPT | Performed by: INTERNAL MEDICINE

## 2020-07-31 PROCEDURE — 77030002996 HC SUT SLK J&J -A: Performed by: INTERNAL MEDICINE

## 2020-07-31 PROCEDURE — 85025 COMPLETE CBC W/AUTO DIFF WBC: CPT

## 2020-07-31 PROCEDURE — 65610000006 HC RM INTENSIVE CARE

## 2020-07-31 PROCEDURE — 02HK3JZ INSERTION OF PACEMAKER LEAD INTO RIGHT VENTRICLE, PERCUTANEOUS APPROACH: ICD-10-PCS | Performed by: INTERNAL MEDICINE

## 2020-07-31 PROCEDURE — 83735 ASSAY OF MAGNESIUM: CPT

## 2020-07-31 PROCEDURE — 84443 ASSAY THYROID STIM HORMONE: CPT

## 2020-07-31 PROCEDURE — 74011250636 HC RX REV CODE- 250/636: Performed by: INTERNAL MEDICINE

## 2020-07-31 PROCEDURE — C1785 PMKR, DUAL, RATE-RESP: HCPCS | Performed by: INTERNAL MEDICINE

## 2020-07-31 PROCEDURE — 77030037875 HC DRSG MEPILEX <16IN BORD MOLN -A: Performed by: INTERNAL MEDICINE

## 2020-07-31 PROCEDURE — 77030031139 HC SUT VCRL2 J&J -A: Performed by: INTERNAL MEDICINE

## 2020-07-31 PROCEDURE — C1894 INTRO/SHEATH, NON-LASER: HCPCS | Performed by: INTERNAL MEDICINE

## 2020-07-31 PROCEDURE — 71045 X-RAY EXAM CHEST 1 VIEW: CPT

## 2020-07-31 PROCEDURE — 33208 INSRT HEART PM ATRIAL & VENT: CPT | Performed by: INTERNAL MEDICINE

## 2020-07-31 PROCEDURE — 02H63JZ INSERTION OF PACEMAKER LEAD INTO RIGHT ATRIUM, PERCUTANEOUS APPROACH: ICD-10-PCS | Performed by: INTERNAL MEDICINE

## 2020-07-31 PROCEDURE — 99152 MOD SED SAME PHYS/QHP 5/>YRS: CPT | Performed by: INTERNAL MEDICINE

## 2020-07-31 PROCEDURE — 77030037713 HC CLOSR DEV INCIS ZIP STRY -B: Performed by: INTERNAL MEDICINE

## 2020-07-31 DEVICE — LEAD PCMKR CAPSUR FIX NOVUS 58 --: Type: IMPLANTABLE DEVICE | Status: FUNCTIONAL

## 2020-07-31 DEVICE — LEAD PCMKR CAPSUR FIX NOVUS 52 --: Type: IMPLANTABLE DEVICE | Status: FUNCTIONAL

## 2020-07-31 DEVICE — PCMKR AZURE XT DR MRI --: Type: IMPLANTABLE DEVICE | Status: FUNCTIONAL

## 2020-07-31 RX ORDER — SODIUM CHLORIDE 0.9 % (FLUSH) 0.9 %
5-40 SYRINGE (ML) INJECTION AS NEEDED
Status: DISCONTINUED | OUTPATIENT
Start: 2020-07-31 | End: 2020-08-01 | Stop reason: HOSPADM

## 2020-07-31 RX ORDER — FUROSEMIDE 40 MG/1
40 TABLET ORAL DAILY
Status: DISCONTINUED | OUTPATIENT
Start: 2020-07-31 | End: 2020-08-01 | Stop reason: HOSPADM

## 2020-07-31 RX ORDER — LIDOCAINE HYDROCHLORIDE 10 MG/ML
INJECTION INFILTRATION; PERINEURAL AS NEEDED
Status: DISCONTINUED | OUTPATIENT
Start: 2020-07-31 | End: 2020-07-31 | Stop reason: HOSPADM

## 2020-07-31 RX ORDER — SODIUM CHLORIDE 0.9 % (FLUSH) 0.9 %
5-40 SYRINGE (ML) INJECTION EVERY 8 HOURS
Status: DISCONTINUED | OUTPATIENT
Start: 2020-07-31 | End: 2020-08-01 | Stop reason: HOSPADM

## 2020-07-31 RX ORDER — METOPROLOL SUCCINATE 50 MG/1
50 TABLET, EXTENDED RELEASE ORAL DAILY
Qty: 30 TAB | Refills: 11 | Status: SHIPPED | OUTPATIENT
Start: 2020-07-31

## 2020-07-31 RX ORDER — FENTANYL CITRATE 50 UG/ML
INJECTION, SOLUTION INTRAMUSCULAR; INTRAVENOUS AS NEEDED
Status: DISCONTINUED | OUTPATIENT
Start: 2020-07-31 | End: 2020-07-31 | Stop reason: HOSPADM

## 2020-07-31 RX ORDER — MIDAZOLAM HYDROCHLORIDE 1 MG/ML
INJECTION, SOLUTION INTRAMUSCULAR; INTRAVENOUS AS NEEDED
Status: DISCONTINUED | OUTPATIENT
Start: 2020-07-31 | End: 2020-07-31 | Stop reason: HOSPADM

## 2020-07-31 RX ORDER — HYDRALAZINE HYDROCHLORIDE 50 MG/1
50 TABLET, FILM COATED ORAL 2 TIMES DAILY
Qty: 60 TAB | Refills: 11 | Status: SHIPPED | OUTPATIENT
Start: 2020-07-31 | End: 2022-02-08 | Stop reason: SDUPTHER

## 2020-07-31 RX ORDER — NALOXONE HYDROCHLORIDE 0.4 MG/ML
0.4 INJECTION, SOLUTION INTRAMUSCULAR; INTRAVENOUS; SUBCUTANEOUS AS NEEDED
Status: DISCONTINUED | OUTPATIENT
Start: 2020-07-31 | End: 2020-08-01 | Stop reason: HOSPADM

## 2020-07-31 RX ORDER — METOPROLOL SUCCINATE 50 MG/1
50 TABLET, EXTENDED RELEASE ORAL DAILY
Status: DISCONTINUED | OUTPATIENT
Start: 2020-07-31 | End: 2020-08-01 | Stop reason: HOSPADM

## 2020-07-31 RX ORDER — FUROSEMIDE 40 MG/1
TABLET ORAL
Qty: 30 TAB | Refills: 11 | Status: SHIPPED | OUTPATIENT
Start: 2020-07-31 | End: 2020-08-04

## 2020-07-31 RX ADMIN — Medication 10 ML: at 05:12

## 2020-07-31 RX ADMIN — Medication 10 ML: at 22:21

## 2020-07-31 RX ADMIN — Medication 10 ML: at 14:00

## 2020-07-31 RX ADMIN — HYDRALAZINE HYDROCHLORIDE 10 MG: 20 INJECTION INTRAMUSCULAR; INTRAVENOUS at 10:10

## 2020-07-31 RX ADMIN — ACETAMINOPHEN 650 MG: 325 TABLET ORAL at 16:38

## 2020-07-31 RX ADMIN — METOPROLOL SUCCINATE 50 MG: 50 TABLET, EXTENDED RELEASE ORAL at 12:01

## 2020-07-31 RX ADMIN — ACETAMINOPHEN 650 MG: 325 TABLET ORAL at 22:20

## 2020-07-31 RX ADMIN — HYDRALAZINE HYDROCHLORIDE 50 MG: 50 TABLET, FILM COATED ORAL at 18:38

## 2020-07-31 RX ADMIN — FUROSEMIDE 40 MG: 40 TABLET ORAL at 22:20

## 2020-07-31 NOTE — PROGRESS NOTES
[address] [place] [address], [address] [facility]  [phone]      Cardiology Progress Note      7/31/2020 10:56 AM    Admit Date: 7/30/2020    Admit Diagnosis:   Congestive heart failure (HCC) [I50.9]  CHF (congestive heart failure) ([address] [I50.9]    Subjective:     OLESYA Saab has no cardiac complaints this am.     S/p PPM insertion. Slightly hypertensive, started on BB today. Labs steady, TSH 0.75, Echo reviewed, K 3.9.        Visit Vitals  /69   Pulse 73   Temp 98 °F (36.7 °C)   Resp 16   Ht 5' 4\" (1.626 m)   Wt 76.9 kg (169 lb 8.5 oz)   SpO2 96%   BMI 29.10 kg/m²       Current Facility-Administered Medications   Medication Dose Route Frequency    sodium chloride (NS) flush 5-40 mL  5-40 mL IntraVENous Q8H    sodium chloride (NS) flush 5-40 mL  5-40 mL IntraVENous PRN    naloxone (NARCAN) injection 0.4 mg  0.4 mg IntraVENous PRN    furosemide (LASIX) tablet 40 mg  40 mg Oral DAILY    metoprolol succinate (TOPROL-XL) XL tablet 50 mg  50 mg Oral DAILY    sodium chloride (NS) flush 5-40 mL  5-40 mL IntraVENous Q8H    sodium chloride (NS) flush 5-40 mL  5-40 mL IntraVENous PRN    acetaminophen (TYLENOL) tablet 650 mg  650 mg Oral Q6H PRN    Or    acetaminophen (TYLENOL) suppository 650 mg  650 mg Rectal Q6H PRN    polyethylene glycol (MIRALAX) packet 17 g  17 g Oral DAILY PRN    promethazine (PHENERGAN) tablet 12.5 mg  12.5 mg Oral Q6H PRN    Or    ondansetron (ZOFRAN) injection 4 mg  4 mg IntraVENous Q6H PRN    enoxaparin (LOVENOX) injection 30 mg  30 mg SubCUTAneous DAILY    levothyroxine (SYNTHROID) tablet 100 mcg  100 mcg Oral ACB    hydrALAZINE (APRESOLINE) tablet 50 mg  50 mg Oral BID    hydrALAZINE (APRESOLINE) 20 mg/mL injection 10 mg  10 mg IntraVENous Q6H PRN    pantoprazole (PROTONIX) tablet 40 mg  40 mg Oral ACB       Objective:      Physical Exam:  General: pleasant, well-nourished elderly  male in NAD  Heart: regular, no m/S3/JVD, no carotid bruits   Lungs: clear diminished   Abdomen: Soft, +BS, NTND   Extremities: LE matt +DP/PT, 1-2+ edema BLE  Neurologic: Grossly normal, good historian, A x O x 3      Data Review:   Recent Labs     07/31/20  0314 07/30/20  0210   WBC 6.7 7.1   HGB 12.4 11.9*   HCT 37.8 36.8    209     Recent Labs     07/31/20  0314 07/30/20  0314    138   K 3.9 4.3    109*   CO2 27 21   GLU 86 96   BUN 36* 34*   CREA 1.90* 1.82*   CA 8.6 8.9   MG 2.4 2.5*   PHOS  --  3.4       Recent Labs     07/30/20  1040 07/30/20  0314   TROIQ 0.05* <0.05     --        No intake or output data in the 24 hours ending 07/31/20 1056     Cardiographics     Telemetry: AV paced 70's  ECG: SR with 1 degree AVB, RBBB, CHB   Echocardiogram:   ·  LV: Normal cavity size. Mild concentric hypertrophy. Mild-to-moderate systolic function. Estimated left ventricular ejection fraction is 55 - 60%. · RA: Moderately dilated right atrium. · MV: Mitral valve thickening. Mild mitral valve regurgitation is present. · PA: Mild to moderate pulmonary hypertension. CXRAY: \"No evidence of pneumothorax status post lead placement. \"      Assessment:     Active Problems:    PVC (premature ventricular contraction) (8/29/2017)      Bradycardia (8/29/2017)      Mobitz type 1 second degree AV block (8/31/2017)      Hypercholesterolemia (6/17/2019)      Essential hypertension (7/18/2019)      Congestive heart failure ([address] (7/30/2020)      CHF (congestive heart failure) ([address] (7/30/2020)      CHB (complete heart block) ([address] (7/31/2020)        Plan:   Malu Murray is a 80 y.o. male with a PMH significant for sinus bradycardia with 1st degree AVB, HLD, GERD, 2:1 AVB, hypothyroidism admitted for Congestive heart failure.      Diastolic CHF exacerbation/HFpEF:EF 55-60% NT pro-BNP 6,962  -Repeat ECHO reviewed  -Continue PO lasix daily   -Patient counseled to check BP and weights daily   -Venous duplexes (-)  -Started on BB     HTN: improving   -conitnue lasix, continue hydralazine PO, start BB     GERD:   -Manage per internal medicine      Acute on chronic CKD stage 3: baseline 1.62, at 1.9.      CHB:   -s/p PPM insertion.   -Check TSH (WNL)       Patient ok for discharge with close follow-up with Dr. Darrell Tang. Appointment scheduled.      Jose DEMPSEYJPRJSYLWIA GUERRERO  MSN,RN,AG-ACNP-BC

## 2020-07-31 NOTE — PROGRESS NOTES
Pt remains in [facility] recovery room, waiting on inpt room (prior room was given to [facility]). Ambulated to rest room x 2 with EAN Tillman, tolerated activity well. Explained delay with room assignment pt verbalized understanding of same. Dinner order called to nutrition services.

## 2020-07-31 NOTE — PROGRESS NOTES
Bedrest complete. Assisted patient with walking in the [place]. No signs or symptoms of chest pain, shortness of breath, or dizziness. Tolerated well. VSS.

## 2020-07-31 NOTE — PROGRESS NOTES
Pt c/o soreness at insertion site, ice pack replaced and medicated with tylenol. Will continue to monitor.

## 2020-07-31 NOTE — PROGRESS NOTES
Hospitalist Progress Note    NAME: Kiersten Olson   :  10/25/1928   MRN:  [id number]       Assessment / Plan:  Acute on chronic diastolic CHF  S/p  IV Lasix 40 mg daily,switched to PO lasix  daily weight I's and O's  Repeat 2D echo showed EF of 55%     Complete heart block seen on the EKG  S/p pacemaker on   Avoid   aV victorina blocking agents   TSH wnl     Worsening CKD 3  Baseline creat around 1.6  Creat trending up to 1.90  Will monitor , if stable or trend down   Will DC pt   Renal US showed no hydronephrosis     Uncontrolled hypertension  Resume hydralazine  PRN hydralazine     GERD  Restart Protonix  Code Status: Full code  Surrogate Decision Maker:     DVT Prophylaxis: Lovenox   GI Prophylaxis: not indicated     Baseline: ambulatory     25.0 - 29.9 Overweight / Body mass index is 29.1 kg/m². Subjective:     Chief Complaint / Reason for Physician Visit  FU CHF/bradycardia . Seen after pacemaker placement at the recovery unit . Discussed with RN events overnight. Review of Systems:  Symptom Y/N Comments  Symptom Y/N Comments   Fever/Chills    Chest Pain n    Poor Appetite    Edema     Cough    Abdominal Pain n    Sputum    Joint Pain     SOB/ALANIS n   Pruritis/Rash     Nausea/vomit n   Tolerating PT/OT     Diarrhea    Tolerating Diet     Constipation    Other       Could NOT obtain due to:      Objective:     VITALS:   Last 24hrs VS reviewed since prior progress note.  Most recent are:  Patient Vitals for the past 24 hrs:   Temp Pulse Resp BP SpO2   20 1600 97.6 °F (36.4 °C) 85  (!) 139/107 94 %   20 1530  85  145/77 97 %   20 1500  85 15 134/77 96 %   20 1430  85 16 (!) 149/100 96 %   20 1400  85 15 (!) 155/97 96 %   20 1330  85 15 165/88 96 %   20 1300  85 15 163/81 95 %   20 1222  71 15 159/74 95 %   20 1130  75 14 152/57 96 %   20 1100  78 19 166/56 97 %   20 1030  73 16 170/69 96 %   20 1000  77 21 199/74 91 %   07/31/20 0930  70 16 196/64 97 %   07/31/20 0900  66 13 185/59 96 %   07/31/20 0845  66 13 172/72 97 %   07/31/20 0830  60 13 179/67 96 %   07/31/20 0823 98 °F (36.7 °C) 60 14 180/69 96 %   07/31/20 0304 98 °F (36.7 °C) (!) 45 18 142/47 96 %   07/30/20 2256 98 °F (36.7 °C) 62 18 145/47 97 %   07/30/20 1926 97.7 °F (36.5 °C) (!) 55 18 137/52 94 %   07/30/20 1843    134/49      No intake or output data in the 24 hours ending 07/31/20 1647     PHYSICAL EXAM:  General: WD, WN. Alert, cooperative, no acute distress    EENT:  EOMI. Anicteric sclerae. MMM  Resp:  CTA bilaterally, no wheezing or rales. No accessory muscle use  CV:  Regular  rhythm,  No edema  Pacemaker site intact   GI:  Soft, Non distended, Non tender.  +Bowel sounds  Neurologic:  Alert and oriented X 3, normal speech,   Psych:   Good insight. Not anxious nor agitated  Skin:  No rashes. No jaundice    Reviewed most current lab test results and cultures  YES  Reviewed most current radiology test results   YES  Review and summation of old records today    NO  Reviewed patient's current orders and MAR    YES  PMH/ reviewed - no change compared to H&P  ________________________________________________________________________  Care Plan discussed with:    Comments   Patient x    Family      RN x    Care Manager     Consultant                        Multidiciplinary team rounds were held today with , nursing, pharmacist and clinical coordinator. Patient's plan of care was discussed; medications were reviewed and discharge planning was addressed.      ________________________________________________________________________  Total NON critical care TIME:  35  Minutes    Total CRITICAL CARE TIME Spent:   Minutes non procedure based      Comments   >50% of visit spent in counseling and coordination of care     ________________________________________________________________________  Kelsie Wayne MD     Procedures: see electronic medical records for all procedures/Xrays and details which were not copied into this note but were reviewed prior to creation of Plan. LABS:  I reviewed today's most current labs and imaging studies.   Pertinent labs include:  Recent Labs     07/31/20 0314 07/30/20  0210   WBC 6.7 7.1   HGB 12.4 11.9*   HCT 37.8 36.8    209     Recent Labs     07/31/20 0314 07/30/20 0314    138   K 3.9 4.3    109*   CO2 27 21   GLU 86 96   BUN 36* 34*   CREA 1.90* 1.82*   CA 8.6 8.9   MG 2.4 2.5*   PHOS  --  3.4       Signed: Kimo Yao MD

## 2020-07-31 NOTE — DISCHARGE INSTRUCTIONS
[address] [place] [address]  [phone]        NEW PACEMAKER IMPLANT DISCHARGE INSTRUCTIONS    Patient ID:  Parul Harvey  [id number]  16 y.o.  10/25/1928    Admit Date: 7/30/2020    Discharge Date: 7/31/2020     Admitting Physician: Mami Valentine MD     Discharge Physician: Mami Valentine MD    Admission Diagnoses:   Congestive heart failure ([address] [I50.9]  CHF (congestive heart failure) ([address] [I50.9]    Discharge Diagnoses: Active Problems:    PVC (premature ventricular contraction) (8/29/2017)      Bradycardia (8/29/2017)      Mobitz type 1 second degree AV block (8/31/2017)      Hypercholesterolemia (6/17/2019)      Essential hypertension (7/18/2019)      Congestive heart failure ([address] (7/30/2020)      CHF (congestive heart failure) ([address] (7/30/2020)      CHB (complete heart block) ([address] (7/31/2020)        Discharge Condition: Good    Cardiology Procedures this Admission:  Pacemaker insertion. Disposition: home    Reference discharge instructions provided by nursing for diet and activity. Follow-up with device clinic in three weeks. Call [phone] to make an appointment. Signed:  CHRISTIAN Carter  7/31/2020  8:17 AM      DISCHARGE INSTRUCTIONS FOR PATIENTS WITH PACEMAKERS    1. Remember to call for an appointment for 3 weeks [phone] to check healing and implant programming. 2. Medic Alert Bracelets are available from your pharmacist to wear at all times if you choose to wear one. 3. Carry your ID card for pacemaker with you at all times. This card will be given to you in the hospital or mailed to you. 4. The pacemaker will bulge slightly under your skin. The bulge will decrease in size over the next few weeks. Please notify the doctor's office if you notice any of the following around your site:   A.  A bruise that does not go away. B.  Soreness or yellow, green, or brown drainage from the site. C. Any swelling from the site. D.   If you have a fever of 100 degrees or higher that lasts for a few days. INCISION CARE       1.  Leave the dressing over your site until your follow up visit. 2.  You may not shower until after follow up visit. 3.  For comfort, wear loose fitting clothing. 1. 4.  Ice pack to affected shoulder for first 24 hours, wear your sling for 2 days. 2. 5.  Report any signs of infection, fever, pain, swelling, redness, oozing, or heat at site especially if these symptoms increase after the first 3 to 4 days. ACTIVITY PRECAUTIONS     1. Avoid rough contact with the implant site. 2. No driving for 14 days. 3. Avoid lifting your arm over your head, carrying anything on the affected side, or lifting over 10 pounds for 90 days. For the first 2 days only bend your arm at the elbow. 4. Any extreme activity such as golf, weight lifting or exercise biking should be restricted for 60 days. 5. Do not carry objects by holding them against your implant site. 6.  No shooting rifles or any type of gun with the affected shoulder permanently. SPECIAL PRECAUTIONS     1. You should avoid all strong magnetic fields, such as arc welding, large transformers, large motors. 2.  You may or may not (depending on your device) have an MRI which uses a strong magnet to take pictures. 3.  Treatments or surgery that requires diathermy or electrocautery should be discussed with your doctor before scheduled. 4. Avoid radio frequency transmitters, including radar. 5. Advise dentist or other medical personnel you see that you have a pacemaker. 6.  Cell phones and microwave oven use is okay. 7.  If you plan to move or take a trip to a new area, the doctor's office will give you a name of a doctor to contact for any problems. ANTIBIOTIC THERAPY    During the first 8 weeks after your pacemaker insertion, you may need antibiotics before any dental work or certain tests or operations.   Let the dentist or doctor who is caring for you know that you have had an implanted device.

## 2020-07-31 NOTE — PROGRESS NOTES
Spoke with both  Damaris NP for Dr. Awilda Ochoa and Yvette Ivan NP for cardiology. They both stated that patient can be D/C'd today from their standpoint. [facility] Dr Lydia Arteaga at this time to let her know if we could  D/C from CCL recovery or if he needed to be admitted to an [place] [address] room.

## 2020-07-31 NOTE — PROGRESS NOTES
Oncology End of Shift Note      Bedside shift change report given to EAN Peralta (incoming nurse) by Alejandra Peng RN (outgoing nurse) on [facility]. Report included the following information SBAR, Kardex and Cardiac Rhythm 1st degree AV block. Shift Summary: Pt slept intermittently during night, ambulatory to bathroom with steady gait, no complaints of pain or dizziness. 2 CHG baths given, preop checklist started. Issues for Physician to Address:  labs     Patient on Cardiac Monitoring?    [x] Yes  [] No    Rhythm:  1st degree AV block, SB, PAC's     Caleb Scale:     Caleb Score: 21        If Caleb Scale less than 15   Patient Mobility Ordered  No  Speciality Bed Ordered   No     Boots Applied                  No      Shift Events--      Alejandra Peng RN

## 2020-07-31 NOTE — PROGRESS NOTES
Problem: Falls - Risk of  Goal: *Absence of Falls  Description: Document Cely Dear Fall Risk and appropriate interventions in the flowsheet.   Outcome: Progressing Towards Goal  Note: Fall Risk Interventions:            Medication Interventions: Teach patient to arise slowly

## 2020-07-31 NOTE — CONSULTS
Subjective:                  [address], [place], [address] [facility]  [phone]    Date of  Admission: 7/30/2020  1:50 AM     Admission type:Emergency    Rubina Yao is a 80 y.o. male admitted for Congestive heart failure (HCC) [I50.9];CHF (congestive heart failure) ([facility] [I50.9]. Found to be in chb. Had progressed from 2:1 HB in the past. Asked to see regarding chb. Has fatigue/sob. Denies cp      Patient Active Problem List    Diagnosis Date Noted    CHB (complete heart block) ([facility] 07/31/2020    Congestive heart failure ([facility] 07/30/2020    CHF (congestive heart failure) ([facility] 07/30/2020    Bilateral carotid artery stenosis 07/18/2019    Essential hypertension 07/18/2019    Hypercholesterolemia 06/17/2019    Mobitz type 1 second degree AV block 08/31/2017    PVC (premature ventricular contraction) 08/29/2017    Bradycardia 08/29/2017    AV block, 1st degree 08/29/2017    Right groin pain 07/09/2013      Lydia Rubi MD  Past Medical History:   Diagnosis Date    Abdominal pain     Dyspepsia and other specified disorders of function of stomach     GERD (gastroesophageal reflux disease)     Hypercholesterolemia     Thyroid disease       Past Surgical History:   Procedure Laterality Date    HX CATARACT REMOVAL      bilateral    HX CATARACT REMOVAL      HX CHOLECYSTECTOMY      HX GI  2010    colon surgery?     HX HERNIA REPAIR      HX OPEN CHOLECYSTECTOMY      HX ORTHOPAEDIC      ingrown left toenail-great toe    HX OTHER SURGICAL      colonoscopy    HX TONSILLECTOMY       Allergies   Allergen Reactions    Morphine Nausea and Vomiting    Morphine Nausea and Vomiting    Pcn [Penicillins] Rash and Swelling    Pcn [Penicillins] Rash     Social History     Tobacco Use    Smoking status: Never Smoker    Smokeless tobacco: Never Used   Substance Use Topics    Alcohol use: Yes     Comment: social    Drug use: Unknown     Types: Prescription     Family History   Problem Relation Age of Onset    Heart Disease Mother     Heart Disease Father       Current Facility-Administered Medications   Medication Dose Route Frequency    sodium chloride (NS) flush 5-40 mL  5-40 mL IntraVENous Q8H    sodium chloride (NS) flush 5-40 mL  5-40 mL IntraVENous PRN    acetaminophen (TYLENOL) tablet 650 mg  650 mg Oral Q6H PRN    Or    acetaminophen (TYLENOL) suppository 650 mg  650 mg Rectal Q6H PRN    polyethylene glycol (MIRALAX) packet 17 g  17 g Oral DAILY PRN    promethazine (PHENERGAN) tablet 12.5 mg  12.5 mg Oral Q6H PRN    Or    ondansetron (ZOFRAN) injection 4 mg  4 mg IntraVENous Q6H PRN    enoxaparin (LOVENOX) injection 30 mg  30 mg SubCUTAneous DAILY    levothyroxine (SYNTHROID) tablet 100 mcg  100 mcg Oral ACB    furosemide (LASIX) injection 40 mg  40 mg IntraVENous DAILY    hydrALAZINE (APRESOLINE) tablet 50 mg  50 mg Oral BID    hydrALAZINE (APRESOLINE) 20 mg/mL injection 10 mg  10 mg IntraVENous Q6H PRN    pantoprazole (PROTONIX) tablet 40 mg  40 mg Oral ACB         Review of Symptoms:  Constitutional: negative  Eyes: negative  Ears, nose, mouth, throat, and face: negative  Respiratory: sob  Cardiovascular: negative  Gastrointestinal: negative  Genitourinary: negative  Musculoskeletal: negative  Neurological: negative  Behvioral/Psych: negative  Endocrine: negative     Subjective:      Visit Vitals  /47 (BP 1 Location: Left arm, BP Patient Position: At rest)   Pulse (!) 45   Temp 98 °F (36.7 °C)   Resp 18   Ht 5' 4\" (1.626 m)   Wt 169 lb 8.5 oz (76.9 kg)   SpO2 96%   BMI 29.10 kg/m²       Physical Exam  Abdomen: soft, non-tender.    Extremities: extremities normal  Heart: bradycardia  Lungs: clear to auscultation bilaterally  Pulses: 2+ and symmetric    Cardiographics    Telemetry: nsr, chb    ECG: nsr, chb    Echocardiogram: nl lvef    Labs:  Recent Labs     07/31/20  0314 07/30/20  0210   WBC 6.7 7.1   HGB 12.4 11.9*   HCT 37.8 36.8    209     Recent Labs 07/31/20  0314 07/30/20  0314    138   K 3.9 4.3    109*   CO2 27 21   GLU 86 96   BUN 36* 34*   CREA 1.90* 1.82*   CA 8.6 8.9   MG 2.4 2.5*   PHOS  --  3.4       Recent Labs     07/30/20  1040 07/30/20  0314   TROIQ 0.05* <0.05     --        No intake or output data in the 24 hours ending 07/31/20 0813      Assessment:     Assessment:       Active Problems:    PVC (premature ventricular contraction) (8/29/2017)      Bradycardia (8/29/2017)      Mobitz type 1 second degree AV block (8/31/2017)      Hypercholesterolemia (6/17/2019)      Essential hypertension (7/18/2019)      Congestive heart failure ([address] (7/30/2020)      CHF (congestive heart failure) ([address] (7/30/2020)      CHB (complete heart block) ([facility] (7/31/2020)         Plan:     OLESYA Calderón is a pleasant gentleman with new chb. He is a candidate for a dc ppm. I discussed the risks/benefits/alternatives of the procedure with the patient. Risks include (but are not limited to) bleeding, heart block, infection, cva/mi/tamponade/death. The patient understands and agrees to proceed. Thank you for this interesting consultation.         Ravi Lemus MD, [facility]    7/31/2020

## 2020-08-01 ENCOUNTER — HOSPITAL ENCOUNTER (EMERGENCY)
Age: 85
Discharge: HOME | End: 2020-08-01
Attending: EMERGENCY MEDICINE
Payer: MEDICARE

## 2020-08-01 VITALS
BODY MASS INDEX: 28.64 KG/M2 | SYSTOLIC BLOOD PRESSURE: 115 MMHG | RESPIRATION RATE: 18 BRPM | HEIGHT: 64 IN | TEMPERATURE: 97.9 F | OXYGEN SATURATION: 97 % | DIASTOLIC BLOOD PRESSURE: 101 MMHG | HEART RATE: 84 BPM | WEIGHT: 167.77 LBS

## 2020-08-01 VITALS
DIASTOLIC BLOOD PRESSURE: 84 MMHG | SYSTOLIC BLOOD PRESSURE: 108 MMHG | WEIGHT: 169.53 LBS | HEIGHT: 64 IN | RESPIRATION RATE: 19 BRPM | BODY MASS INDEX: 28.94 KG/M2 | OXYGEN SATURATION: 97 % | HEART RATE: 86 BPM | TEMPERATURE: 98.1 F

## 2020-08-01 DIAGNOSIS — I95.9 TRANSIENT HYPOTENSION: Primary | ICD-10-CM

## 2020-08-01 DIAGNOSIS — R11.0 NAUSEA WITHOUT VOMITING: ICD-10-CM

## 2020-08-01 LAB
ALBUMIN SERPL-MCNC: 3.9 G/DL (ref 3.5–5)
ALBUMIN/GLOB SERPL: 1.1 {RATIO} (ref 1.1–2.2)
ALP SERPL-CCNC: 104 U/L (ref 45–117)
ALT SERPL-CCNC: 26 U/L (ref 12–78)
ANION GAP SERPL CALC-SCNC: 3 MMOL/L (ref 5–15)
ANION GAP SERPL CALC-SCNC: 5 MMOL/L (ref 5–15)
APPEARANCE UR: CLEAR
AST SERPL-CCNC: 28 U/L (ref 15–37)
ATRIAL RATE: 84 BPM
BACTERIA URNS QL MICRO: NEGATIVE /HPF
BASOPHILS # BLD: 0 K/UL (ref 0–0.1)
BASOPHILS NFR BLD: 0 % (ref 0–1)
BILIRUB SERPL-MCNC: 1.2 MG/DL (ref 0.2–1)
BILIRUB UR QL CFM: NEGATIVE
BUN SERPL-MCNC: 30 MG/DL (ref 6–20)
BUN SERPL-MCNC: 35 MG/DL (ref 6–20)
BUN/CREAT SERPL: 17 (ref 12–20)
BUN/CREAT SERPL: 19 (ref 12–20)
CALCIUM SERPL-MCNC: 8.1 MG/DL (ref 8.5–10.1)
CALCIUM SERPL-MCNC: 8.9 MG/DL (ref 8.5–10.1)
CALCULATED P AXIS, ECG09: 107 DEGREES
CALCULATED R AXIS, ECG10: -80 DEGREES
CALCULATED T AXIS, ECG11: 88 DEGREES
CHLORIDE SERPL-SCNC: 104 MMOL/L (ref 97–108)
CHLORIDE SERPL-SCNC: 105 MMOL/L (ref 97–108)
CO2 SERPL-SCNC: 27 MMOL/L (ref 21–32)
CO2 SERPL-SCNC: 30 MMOL/L (ref 21–32)
COLOR UR: ABNORMAL
COMMENT, HOLDF: NORMAL
CREAT SERPL-MCNC: 1.58 MG/DL (ref 0.7–1.3)
CREAT SERPL-MCNC: 2.09 MG/DL (ref 0.7–1.3)
DIAGNOSIS, 93000: NORMAL
DIFFERENTIAL METHOD BLD: NORMAL
EOSINOPHIL # BLD: 0.2 K/UL (ref 0–0.4)
EOSINOPHIL NFR BLD: 2 % (ref 0–7)
EPITH CASTS URNS QL MICRO: ABNORMAL /LPF
ERYTHROCYTE [DISTWIDTH] IN BLOOD BY AUTOMATED COUNT: 13.9 % (ref 11.5–14.5)
GLOBULIN SER CALC-MCNC: 3.6 G/DL (ref 2–4)
GLUCOSE SERPL-MCNC: 108 MG/DL (ref 65–100)
GLUCOSE SERPL-MCNC: 85 MG/DL (ref 65–100)
GLUCOSE UR STRIP.AUTO-MCNC: NEGATIVE MG/DL
HCT VFR BLD AUTO: 41.8 % (ref 36.6–50.3)
HGB BLD-MCNC: 13.7 G/DL (ref 12.1–17)
HGB UR QL STRIP: NEGATIVE
HYALINE CASTS URNS QL MICRO: ABNORMAL /LPF (ref 0–5)
IMM GRANULOCYTES # BLD AUTO: 0 K/UL (ref 0–0.04)
IMM GRANULOCYTES NFR BLD AUTO: 0 % (ref 0–0.5)
KETONES UR QL STRIP.AUTO: ABNORMAL MG/DL
LEUKOCYTE ESTERASE UR QL STRIP.AUTO: NEGATIVE
LYMPHOCYTES # BLD: 1.3 K/UL (ref 0.8–3.5)
LYMPHOCYTES NFR BLD: 19 % (ref 12–49)
MAGNESIUM SERPL-MCNC: 2.4 MG/DL (ref 1.6–2.4)
MCH RBC QN AUTO: 31.1 PG (ref 26–34)
MCHC RBC AUTO-ENTMCNC: 32.8 G/DL (ref 30–36.5)
MCV RBC AUTO: 95 FL (ref 80–99)
MONOCYTES # BLD: 0.7 K/UL (ref 0–1)
MONOCYTES NFR BLD: 11 % (ref 5–13)
MUCOUS THREADS URNS QL MICRO: ABNORMAL /LPF
NEUTS SEG # BLD: 4.7 K/UL (ref 1.8–8)
NEUTS SEG NFR BLD: 68 % (ref 32–75)
NITRITE UR QL STRIP.AUTO: NEGATIVE
NRBC # BLD: 0 K/UL (ref 0–0.01)
NRBC BLD-RTO: 0 PER 100 WBC
P-R INTERVAL, ECG05: 178 MS
PH UR STRIP: 5 [PH] (ref 5–8)
PLATELET # BLD AUTO: 172 K/UL (ref 150–400)
PMV BLD AUTO: 10.8 FL (ref 8.9–12.9)
POTASSIUM SERPL-SCNC: 4.1 MMOL/L (ref 3.5–5.1)
POTASSIUM SERPL-SCNC: 4.5 MMOL/L (ref 3.5–5.1)
PROT SERPL-MCNC: 7.5 G/DL (ref 6.4–8.2)
PROT UR STRIP-MCNC: 30 MG/DL
Q-T INTERVAL, ECG07: 464 MS
QRS DURATION, ECG06: 172 MS
QTC CALCULATION (BEZET), ECG08: 548 MS
RBC # BLD AUTO: 4.4 M/UL (ref 4.1–5.7)
RBC #/AREA URNS HPF: ABNORMAL /HPF (ref 0–5)
SAMPLES BEING HELD,HOLD: NORMAL
SODIUM SERPL-SCNC: 137 MMOL/L (ref 136–145)
SODIUM SERPL-SCNC: 137 MMOL/L (ref 136–145)
SP GR UR REFRACTOMETRY: 1.02 (ref 1–1.03)
UROBILINOGEN UR QL STRIP.AUTO: 0.2 EU/DL (ref 0.2–1)
VENTRICULAR RATE, ECG03: 84 BPM
WBC # BLD AUTO: 6.9 K/UL (ref 4.1–11.1)
WBC URNS QL MICRO: ABNORMAL /HPF (ref 0–4)

## 2020-08-01 PROCEDURE — 36415 COLL VENOUS BLD VENIPUNCTURE: CPT

## 2020-08-01 PROCEDURE — 81001 URINALYSIS AUTO W/SCOPE: CPT

## 2020-08-01 PROCEDURE — 99285 EMERGENCY DEPT VISIT HI MDM: CPT

## 2020-08-01 PROCEDURE — 93005 ELECTROCARDIOGRAM TRACING: CPT

## 2020-08-01 PROCEDURE — 74011250636 HC RX REV CODE- 250/636: Performed by: INTERNAL MEDICINE

## 2020-08-01 PROCEDURE — 80053 COMPREHEN METABOLIC PANEL: CPT

## 2020-08-01 PROCEDURE — 85025 COMPLETE CBC W/AUTO DIFF WBC: CPT

## 2020-08-01 PROCEDURE — 83735 ASSAY OF MAGNESIUM: CPT

## 2020-08-01 PROCEDURE — 74011250637 HC RX REV CODE- 250/637: Performed by: INTERNAL MEDICINE

## 2020-08-01 PROCEDURE — 96360 HYDRATION IV INFUSION INIT: CPT

## 2020-08-01 PROCEDURE — 74011250636 HC RX REV CODE- 250/636: Performed by: EMERGENCY MEDICINE

## 2020-08-01 RX ORDER — ONDANSETRON 4 MG/1
4 TABLET, ORALLY DISINTEGRATING ORAL
Qty: 10 TAB | Refills: 0 | Status: SHIPPED | OUTPATIENT
Start: 2020-08-01 | End: 2020-08-24

## 2020-08-01 RX ADMIN — PANTOPRAZOLE SODIUM 40 MG: 40 TABLET, DELAYED RELEASE ORAL at 08:58

## 2020-08-01 RX ADMIN — SODIUM CHLORIDE 500 ML: 900 INJECTION, SOLUTION INTRAVENOUS at 21:29

## 2020-08-01 RX ADMIN — LEVOTHYROXINE SODIUM 100 MCG: 0.1 TABLET ORAL at 08:58

## 2020-08-01 RX ADMIN — Medication 10 ML: at 05:47

## 2020-08-01 RX ADMIN — ENOXAPARIN SODIUM 30 MG: 30 INJECTION SUBCUTANEOUS at 08:58

## 2020-08-01 RX ADMIN — HYDRALAZINE HYDROCHLORIDE 50 MG: 50 TABLET, FILM COATED ORAL at 08:58

## 2020-08-01 RX ADMIN — METOPROLOL SUCCINATE 50 MG: 50 TABLET, EXTENDED RELEASE ORAL at 08:58

## 2020-08-01 NOTE — PROGRESS NOTES
Bedside shift change report given to Ernestine Dukes (oncoming nurse) by Amanda Cervantes RN (offgoing nurse). Report included the following information SBAR, Kardex, Procedure Summary, Intake/Output, MAR, Accordion, Recent Results, Med Rec Status, Cardiac Rhythm Paced, Alarm Parameters , Pre Procedure Checklist, Procedure Verification, Quality Measures and Dual Neuro Assessment. Left chest Pacemaker site dressing intact, no bleeding and hematoma. Pt. Is asymptomatic and stable vital signs.

## 2020-08-01 NOTE — DISCHARGE SUMMARY
Hospitalist Discharge Summary     Patient ID:  Rubina Yao  [id number]  85 y.o.  10/25/1928  7/30/2020    PCP on record: Lydia Rubi MD    Admit date: 7/30/2020  Discharge date and time: 8/1/2020    DISCHARGE DIAGNOSIS:  Active Problems:    PVC (premature ventricular contraction) (8/29/2017)      Bradycardia (8/29/2017)      Mobitz type 1 second degree AV block (8/31/2017)      Hypercholesterolemia (6/17/2019)      Essential hypertension (7/18/2019)      Congestive heart failure ([facility] (7/30/2020)      CHF (congestive heart failure) ([facility] (7/30/2020)      CHB (complete heart block) ([facility] (7/31/2020)        CONSULTATIONS:  IP CONSULT TO ELECTROPHYSIOLOGY    Excerpted HPI from H&P of Vidhya Romero MD:  Rubina Yao is a 80 y.o.  male with past medical history of GERD, hyperlipidemia, thyroid disease who presents with shortness of breath and chest tightness started yesterday associated with increasing lower extremity edema. He also reported that his blood pressure was noted to be elevated  Patient reported compliance to his medication  In the ED, his blood pressure was elevated, was bradycardic. Review of his labs are unremarkable except for elevated proBNP  Initial troponin less than 0.05. EKG showedSinus rhythm with complete heart block , Right bundle branch block    Patient denies any current chest pain reported feeling better    ______________________________________________________________________  DISCHARGE SUMMARY/HOSPITAL COURSE:  for full details see H&P, daily progress notes, labs, consult notes.    Acute on chronic diastolic CHF  S/p  IV Lasix 40 mg daily,switched to PO lasix  daily weight I's and O's  Repeat 2D echo showed EF of 55%     Complete heart block seen on the EKG  S/p pacemaker on 07/31  Avoid   aV victorina blocking agents   TSH wnl      Worsening CKD 3  Baseline creat around 1.6  Creat trending up to 1.90  Creat trending down to baseline   Renal US showed no hydronephrosis      Uncontrolled hypertension  Resume hydralazine  PRN hydralazine  BP controlled now      GERD  Restart Protonix    _______________________________________________________________________  Patient seen and examined by me on discharge day. Pertinent Findings:  Gen:    Not in distress  Chest: Clear lungs  CVS:   Regular rhythm. No edema  Abd:  Soft, not distended, not tender  Neuro:  Alert, orientedx4  _______________________________________________________________________  DISCHARGE MEDICATIONS:   Current Discharge Medication List      START taking these medications    Details   furosemide (LASIX) 40 mg tablet Patient has diastolic HF  Indications: visible water retention  Qty: 30 Tab, Refills: 11      metoprolol succinate (TOPROL-XL) 50 mg XL tablet Take 1 Tab by mouth daily. Qty: 30 Tab, Refills: 11         CONTINUE these medications which have CHANGED    Details   hydrALAZINE (APRESOLINE) 50 mg tablet Take 1 Tab by mouth two (2) times a day. Qty: 60 Tab, Refills: 11         CONTINUE these medications which have NOT CHANGED    Details   omeprazole (PRILOSEC) 20 mg capsule Take 20 mg by mouth daily. colestipol (COLESTID) 1 gram tablet Take 1 g by mouth two (2) times a day. Separate 1-2 hours from other drugs. Max: 4 tabs      levothyroxine (SYNTHROID) 100 mcg tablet Take 100 mcg by mouth daily (before breakfast). STOP taking these medications       hydroCHLOROthiazide (HYDRODIURIL) 12.5 mg tablet Comments:   Reason for Stopping:                 Patient Follow Up Instructions: Activity: Activity as tolerated  Diet: Cardiac Diet  Wound Care: None needed    Follow-up with PCP  in 7 days.   Follow-up tests/labs   Follow-up Information     Follow up With Specialties Details Why Contact Info    [address] [facility] [address]inder Abrams, [address] [facility]  [phone]          ________________________________________________________________    Risk of deterioration: Low    Condition at Discharge:  Stable  __________________________________________________________________    Disposition  Home with family, no needs    ____________________________________________________________________    Code Status: Full Code  ___________________________________________________________________      Total time in minutes spent coordinating this discharge (includes going over instructions, follow-up, prescriptions, and preparing report for sign off to her PCP) :  35 minutes    Signed:  Toan Weems MD

## 2020-08-01 NOTE — PROGRESS NOTES
Discharge instructions reviewed with pt and son including site care, restrictions, follow-ups, medications, and pain control. No further questions at this time.

## 2020-08-01 NOTE — ED NOTES
Pt presents from waiting room after pacemaker placement yesterday. Pt w/ c/o low BP at home and some dizziness. Pt A&O x4, 3 points of VS, daughter at bedside.

## 2020-08-01 NOTE — PROGRESS NOTES
Problem: Falls - Risk of  Goal: *Absence of Falls  Description: Document Erik Lafleur Fall Risk and appropriate interventions in the flowsheet. Outcome: Progressing Towards Goal  Note: Fall Risk Interventions:            Medication Interventions: Assess postural VS orthostatic hypotension, Evaluate medications/consider consulting pharmacy, Patient to call before getting OOB, Teach patient to arise slowly                   Problem: Patient Education: Go to Patient Education Activity  Goal: Patient/Family Education  Outcome: Progressing Towards Goal     Problem: Pain  Goal: *Control of Pain  Outcome: Progressing Towards Goal  Goal: *PALLIATIVE CARE:  Alleviation of Pain  Outcome: Progressing Towards Goal     Problem: Patient Education: Go to Patient Education Activity  Goal: Patient/Family Education  Outcome: Progressing Towards Goal     Problem: Pressure Injury - Risk of  Goal: *Prevention of pressure injury  Description: Document Caleb Scale and appropriate interventions in the flowsheet.   Outcome: Progressing Towards Goal  Note: Pressure Injury Interventions:                 Mobility Interventions: HOB 30 degrees or less    Nutrition Interventions: Document food/fluid/supplement intake                     Problem: Patient Education: Go to Patient Education Activity  Goal: Patient/Family Education  Outcome: Progressing Towards Goal     Problem: Patient Education: Go to Patient Education Activity  Goal: Patient/Family Education  Outcome: Progressing Towards Goal     Problem: Pacer/ICD: Post-Procedure  Goal: Off Pathway (Use only if patient is Off Pathway)  Outcome: Progressing Towards Goal  Goal: Activity/Safety  Outcome: Progressing Towards Goal  Goal: Consults, if ordered  Outcome: Progressing Towards Goal  Goal: Diagnostic Test/Procedures  Outcome: Progressing Towards Goal  Goal: Nutrition/Diet  Outcome: Progressing Towards Goal  Goal: Discharge Planning  Outcome: Progressing Towards Goal  Goal: Medications  Outcome: Progressing Towards Goal  Goal: Respiratory  Outcome: Progressing Towards Goal  Goal: Treatments/Interventions/Procedures  Outcome: Progressing Towards Goal  Goal: Psychosocial  Outcome: Progressing Towards Goal  Goal: *Hemodynamically stable  Outcome: Progressing Towards Goal  Goal: *Optimal pain control at patient's stated goal  Outcome: Progressing Towards Goal  Goal: *Absence of signs and symptoms of infection or wound complication  Outcome: Progressing Towards Goal

## 2020-08-01 NOTE — PROGRESS NOTES
3:45 PM Received concerned phone call from patient's daughter, Demetrice Zapien. Demetrice Nicholes states, \"my dad feels a little unsteady and a little nauseous. \" Notified Dr. Kaylee Gerard of patient's complaints. Daughter states she also called Dr. Yancy Regalado and will check Mr. Farah's BP and HR and she will call Dr. Yancy Regalado back with results. Daughter verbalizes that she will call 911 if patient's symptoms worsen. Dr. Kaylee Gerard aware and agrees with plan.

## 2020-08-01 NOTE — PROGRESS NOTES
Bedside shift change report given to Rosalva Escalante RN (oncoming nurse) by Clara Meneses RN (offgoing nurse). Report included the following information SBAR, Kardex, Procedure Summary, Intake/Output, MAR, Accordion, Recent Results, Med Rec Status, Cardiac Rhythm Paced, Alarm Parameters , Pre Procedure Checklist, Procedure Verification, Quality Measures and Dual Neuro Assessment. Left chest Pacemaker site dressing intact, no bleeding and hematoma. Pt. Is asymptomatic and stable vital signs.

## 2020-08-02 NOTE — ED PROVIDER NOTES
EMERGENCY DEPARTMENT HISTORY AND PHYSICAL EXAM      Date: 8/1/2020  Patient Name: Kennyth Moritz    History of Presenting Illness     Chief Complaint   Patient presents with    Referral / Consult     Patient had a pacemaker placed yesterday. Discharged from hospital today. Home cuff showed low BP and wanted him further evaluated. History Provided By: Patient and Patient's Daughter    HPI: Kennyth Moritz, 80 y.o. male presents to the ED with cc of low blood pressure. Patient was discharged from the hospital this morning. He had a pacemaker placed yesterday. He felt nauseated and \"funny\" earlier today. His daughter took his blood pressure and it was 80 systolic several times. He called his cardiologist and they said he should come in for an evaluation. He denies any symptoms currently. He denies dizziness, chest pain, cough, fever or chills. He has a baseline shortness of breath which he had prior to admission to the hospital.  He had 2 chest x-rays within the last 2 days which were unremarkable. He denies dysuria or diarrhea. He was started on metoprolol yesterday. There are no other complaints, changes, or physical findings at this time.     PCP: Yair Milian MD    Current Facility-Administered Medications on File Prior to Encounter   Medication Dose Route Frequency Provider Last Rate Last Dose    [DISCONTINUED] sodium chloride (NS) flush 5-40 mL  5-40 mL IntraVENous Q8H Erick Browne MD   10 mL at 08/01/20 0547    [DISCONTINUED] sodium chloride (NS) flush 5-40 mL  5-40 mL IntraVENous PRN Lexii Browne MD        [DISCONTINUED] naloxone (NARCAN) injection 0.4 mg  0.4 mg IntraVENous PRN Mary Cohen MD        [DISCONTINUED] furosemide (LASIX) tablet 40 mg  40 mg Oral DAILY Cheyenne Leon MD   40 mg at 07/31/20 2220    [DISCONTINUED] metoprolol succinate (TOPROL-XL) XL tablet 50 mg  50 mg Oral DAILY Mary Cohen MD   50 mg at 08/01/20 0858    [DISCONTINUED] sodium chloride (NS) flush 5-40 mL  5-40 mL IntraVENous Q8H Pooja Mead MD   10 mL at 07/31/20 0512    [DISCONTINUED] sodium chloride (NS) flush 5-40 mL  5-40 mL IntraVENous PRN Pooja Mead MD        [DISCONTINUED] acetaminophen (TYLENOL) tablet 650 mg  650 mg Oral Q6H PRN Pooja Mead MD   650 mg at 07/31/20 2220    [DISCONTINUED] acetaminophen (TYLENOL) suppository 650 mg  650 mg Rectal Q6H PRN Pooja Mead MD        [DISCONTINUED] polyethylene glycol (MIRALAX) packet 17 g  17 g Oral DAILY PRN Pooja Mead MD        [DISCONTINUED] promethazine (PHENERGAN) tablet 12.5 mg  12.5 mg Oral Q6H PRN Pooja Mead MD        [DISCONTINUED] ondansetron (ZOFRAN) injection 4 mg  4 mg IntraVENous Q6H PRN Pooja Mead MD        [DISCONTINUED] enoxaparin (LOVENOX) injection 30 mg  30 mg SubCUTAneous DAILY Pooja Mead MD   30 mg at 08/01/20 0858    [DISCONTINUED] levothyroxine (SYNTHROID) tablet 100 mcg  100 mcg Oral ACB Pooja Mead MD   100 mcg at 08/01/20 0858    [DISCONTINUED] hydrALAZINE (APRESOLINE) tablet 50 mg  50 mg Oral BID Steve Lainez MD   50 mg at 08/01/20 0858    [DISCONTINUED] hydrALAZINE (APRESOLINE) 20 mg/mL injection 10 mg  10 mg IntraVENous Q6H PRN Steve Lainez MD   10 mg at 07/31/20 1010    [DISCONTINUED] pantoprazole (PROTONIX) tablet 40 mg  40 mg Oral ACB Steve Lainez MD   40 mg at 08/01/20 4749     Current Outpatient Medications on File Prior to Encounter   Medication Sig Dispense Refill    hydrALAZINE (APRESOLINE) 50 mg tablet Take 1 Tab by mouth two (2) times a day. 60 Tab 11    furosemide (LASIX) 40 mg tablet Patient has diastolic HF  Indications: visible water retention 30 Tab 11    metoprolol succinate (TOPROL-XL) 50 mg XL tablet Take 1 Tab by mouth daily. 30 Tab 11    omeprazole (PRILOSEC) 20 mg capsule Take 20 mg by mouth daily.  [DISCONTINUED] hydrALAZINE (APRESOLINE) 25 mg tablet Take 25 mg by mouth two (2) times a day.       [DISCONTINUED] hydroCHLOROthiazide (HYDRODIURIL) 12.5 mg tablet 1 tab by mouth daily 90 Tab 1    colestipol (COLESTID) 1 gram tablet Take 1 g by mouth two (2) times a day. Separate 1-2 hours from other drugs. Max: 4 tabs      levothyroxine (SYNTHROID) 100 mcg tablet Take 100 mcg by mouth daily (before breakfast). Past History     Past Medical History:  Past Medical History:   Diagnosis Date    Abdominal pain     Dyspepsia and other specified disorders of function of stomach     GERD (gastroesophageal reflux disease)     Hypercholesterolemia     Thyroid disease        Past Surgical History:  Past Surgical History:   Procedure Laterality Date    HX CATARACT REMOVAL      bilateral    HX CATARACT REMOVAL      HX CHOLECYSTECTOMY      HX GI  2010    colon surgery?  HX HERNIA REPAIR      HX OPEN CHOLECYSTECTOMY      HX ORTHOPAEDIC      ingrown left toenail-great toe    HX OTHER SURGICAL      colonoscopy    HX TONSILLECTOMY         Family History:  Family History   Problem Relation Age of Onset    Heart Disease Mother     Heart Disease Father        Social History:  Social History     Tobacco Use    Smoking status: Never Smoker    Smokeless tobacco: Never Used   Substance Use Topics    Alcohol use: Yes     Comment: social    Drug use: Unknown     Types: Prescription       Allergies: Allergies   Allergen Reactions    Morphine Nausea and Vomiting    Morphine Nausea and Vomiting    Pcn [Penicillins] Rash and Swelling    Pcn [Penicillins] Rash         Review of Systems   Review of Systems   Constitutional: Negative for fever. HENT: Negative for congestion. Eyes: Negative. Respiratory: Negative for shortness of breath. Cardiovascular: Negative for chest pain. Gastrointestinal: Positive for nausea. Negative for abdominal pain. Endocrine: Negative for heat intolerance. Genitourinary: Negative for dysuria. Musculoskeletal: Negative for back pain. Skin: Negative for rash. Allergic/Immunologic: Negative for immunocompromised state. Neurological: Negative for headaches. Hematological: Does not bruise/bleed easily. Psychiatric/Behavioral: Negative. All other systems reviewed and are negative. Physical Exam   Physical Exam  Vitals signs and nursing note reviewed. Constitutional:       General: He is not in acute distress. Appearance: He is well-developed. HENT:      Head: Normocephalic and atraumatic. Eyes:      Extraocular Movements: Extraocular movements intact. Pupils: Pupils are equal, round, and reactive to light. Neck:      Musculoskeletal: Normal range of motion. Cardiovascular:      Rate and Rhythm: Normal rate and regular rhythm. Pulses: Normal pulses. Heart sounds: Normal heart sounds. Comments: Pacer site clean and dry  Pulmonary:      Effort: Pulmonary effort is normal.      Breath sounds: Normal breath sounds. Abdominal:      General: Bowel sounds are normal.      Palpations: Abdomen is soft. Musculoskeletal: Normal range of motion. Skin:     General: Skin is warm and dry. Neurological:      Mental Status: He is alert and oriented to person, place, and time. Coordination: Coordination normal.   Psychiatric:         Mood and Affect: Mood normal.         Behavior: Behavior normal.         Diagnostic Study Results     Labs -     Recent Results (from the past 12 hour(s))   CBC WITH AUTOMATED DIFF    Collection Time: 08/01/20  7:42 PM   Result Value Ref Range    WBC 6.9 4.1 - 11.1 K/uL    RBC 4.40 4. 10 - 5.70 M/uL    HGB 13.7 12.1 - 17.0 g/dL    HCT 41.8 36.6 - 50.3 %    MCV 95.0 80.0 - 99.0 FL    MCH 31.1 26.0 - 34.0 PG    MCHC 32.8 30.0 - 36.5 g/dL    RDW 13.9 11.5 - 14.5 %    PLATELET 195 236 - 080 K/uL    MPV 10.8 8.9 - 12.9 FL    NRBC 0.0 0  WBC    ABSOLUTE NRBC 0.00 0.00 - 0.01 K/uL    NEUTROPHILS 68 32 - 75 %    LYMPHOCYTES 19 12 - 49 %    MONOCYTES 11 5 - 13 %    EOSINOPHILS 2 0 - 7 %    BASOPHILS 0 0 - 1 %    IMMATURE GRANULOCYTES 0 0.0 - 0.5 %    ABS. NEUTROPHILS 4.7 1.8 - 8.0 K/UL    ABS. LYMPHOCYTES 1.3 0.8 - 3.5 K/UL    ABS. MONOCYTES 0.7 0.0 - 1.0 K/UL    ABS. EOSINOPHILS 0.2 0.0 - 0.4 K/UL    ABS. BASOPHILS 0.0 0.0 - 0.1 K/UL    ABS. IMM. GRANS. 0.0 0.00 - 0.04 K/UL    DF AUTOMATED     SAMPLES BEING HELD    Collection Time: 08/01/20  7:42 PM   Result Value Ref Range    SAMPLES BEING HELD  robin     COMMENT        Add-on orders for these samples will be processed based on acceptable specimen integrity and analyte stability, which may vary by analyte. METABOLIC PANEL, COMPREHENSIVE    Collection Time: 08/01/20  7:42 PM   Result Value Ref Range    Sodium 137 136 - 145 mmol/L    Potassium 4.5 3.5 - 5.1 mmol/L    Chloride 104 97 - 108 mmol/L    CO2 30 21 - 32 mmol/L    Anion gap 3 (L) 5 - 15 mmol/L    Glucose 108 (H) 65 - 100 mg/dL    BUN 35 (H) 6 - 20 MG/DL    Creatinine 2.09 (H) 0.70 - 1.30 MG/DL    BUN/Creatinine ratio 17 12 - 20      GFR est AA 36 (L) >60 ml/min/1.73m2    GFR est non-AA 30 (L) >60 ml/min/1.73m2    Calcium 8.9 8.5 - 10.1 MG/DL    Bilirubin, total 1.2 (H) 0.2 - 1.0 MG/DL    ALT (SGPT) 26 12 - 78 U/L    AST (SGOT) 28 15 - 37 U/L    Alk. phosphatase 104 45 - 117 U/L    Protein, total 7.5 6.4 - 8.2 g/dL    Albumin 3.9 3.5 - 5.0 g/dL    Globulin 3.6 2.0 - 4.0 g/dL    A-G Ratio 1.1 1.1 - 2.2     MAGNESIUM    Collection Time: 08/01/20  7:42 PM   Result Value Ref Range    Magnesium 2.4 1.6 - 2.4 mg/dL       Radiologic Studies -   No orders to display     CT Results  (Last 48 hours)    None        CXR Results  (Last 48 hours)               07/31/20 0843  XR CHEST PORT Final result    Impression:  IMPRESSION: No evidence of pneumothorax status post lead placement. Narrative:  EXAM: XR CHEST PORT       INDICATION: Evaluate for pneumothorax       COMPARISON: July 30       FINDINGS: A portable AP radiograph of the chest was obtained at 0835 hours.  Left   subclavian leads are seen in the region of the right atrium and right ventricle. The patient is on a cardiac monitor. The lungs are hypoinflated but appear   clear. There is atherosclerosis of the aorta. There is a scoliosis. There are   clips in the upper abdomen. Medical Decision Making   I am the first provider for this patient. I reviewed the vital signs, available nursing notes, past medical history, past surgical history, family history and social history. Vital Signs-Reviewed the patient's vital signs. Patient Vitals for the past 12 hrs:   Temp Pulse Resp BP SpO2   08/01/20 2108    108/84    08/01/20 2100  86 19 121/58 97 %   08/01/20 2059  86 21 130/75 96 %   08/01/20 2057  85 18 132/77 97 %   08/01/20 1757 98.1 °F (36.7 °C) 86 18 108/57 96 %           Records Reviewed: Nursing Notes, Old Medical Records, Previous Radiology Studies and Previous Laboratory Studies    Provider Notes (Medical Decision Making):   Medication side effect, dehydration, electrolyte abnormality, UTI, anemia    ED Course:   Initial assessment performed. The patients presenting problems have been discussed, and they are in agreement with the care plan formulated and outlined with them. I have encouraged them to ask questions as they arise throughout their visit. Progress note:    Patient's results were reviewed. Patient is feeling better. He is advised to follow-up and return ER if worse         Critical Care Time:   0    Disposition:  home    DISCHARGE PLAN:  1. Current Discharge Medication List      START taking these medications    Details   ondansetron (Zofran ODT) 4 mg disintegrating tablet Take 1 Tab by mouth every eight (8) hours as needed for Nausea. Qty: 10 Tab, Refills: 0           2.    Follow-up Information     Follow up With Specialties Details Why Contact Info    Yair Milian MD Family Medicine In 2 days As needed [address] [place] [address]  [phone]      [facility] EMERGENCY DEPT Emergency Medicine  If symptoms worsen [address] [place] [address]  [phone]        3. Return to ED if worse     Diagnosis     Clinical Impression:   1. Transient hypotension    2. Nausea without vomiting        Attestations:    Rachel Simmons MD    Please note that this dictation was completed with [employer], the computer voice recognition software. Quite often unanticipated grammatical, syntax, homophones, and other interpretive errors are inadvertently transcribed by the computer software. Please disregard these errors. Please excuse any errors that have escaped final proofreading. Thank you.

## 2020-08-02 NOTE — DISCHARGE INSTRUCTIONS
Patient Education        Low Blood Pressure: Care Instructions  Your Care Instructions     Blood pressure is a measurement of the force of the blood against the walls of the blood vessels during and after each beat of the heart. Low blood pressure is also called hypotension. It means that your blood pressure is much lower than normal. Some people, especially young, slim women, may have slightly low blood pressure without symptoms. But in many people, low blood pressure can cause symptoms such as feeling dizzy or lightheaded. When your blood pressure is too low, your heart, brain, and other organs do not get enough blood. Low blood pressure can be caused by many things, including heart problems and some medicines. Diabetes that is not under control can cause your blood pressure to drop. And so can a severe allergic reaction or infection. Another cause is dehydration, which is when your body loses too much fluid. Treatment for low blood pressure depends on the cause. Follow-up care is a key part of your treatment and safety. Be sure to make and go to all appointments, and call your doctor if you are having problems. It's also a good idea to know your test results and keep a list of the medicines you take. How can you care for yourself at home? · Be safe with medicines. Call your doctor if you think you are having a problem with your medicine. You will get more details on the specific medicines your doctor prescribes. · If you feel dizzy or lightheaded, sit down or lie down for a few minutes. Or you can sit down and put your head between your knees. This will help your blood pressure go back to normal and help your symptoms go away. · Follow your doctor's suggestions for ways to prevent symptoms like dizziness. These suggestions may include:  ? Get up slowly from bed or after sitting for a long time. If you are in bed, roll to your side and swing your legs over the edge of the bed and onto the floor.  Push your body up to a sitting position. Wait for a while before you slowly stand up.  ? Add more salt to your diet, if your doctor recommends it. ? Drink plenty of fluids, enough so that your urine is light yellow or clear like water. Choose water and other caffeine-free clear liquids. If you have kidney, heart, or liver disease and have to limit fluids, talk with your doctor before you increase the amount of fluids you drink. ? Avoid or limit alcohol to 2 drinks a day for men and 1 drink a day for women. Alcohol may interfere with your medicine. In addition, alcohol can make your low blood pressure worse by causing your body to lose water. ? Avoid or limit caffeine. Caffeine can cause your body to lose water. ? Wear compression stockings to help improve blood flow. When should you call for help? [id number] anytime you think you may need emergency care. For example, call if:  · You passed out (lost consciousness). Call your doctor now or seek immediate medical care if:  · You are dizzy or lightheaded, or you feel like you may faint. Watch closely for changes in your health, and be sure to contact your doctor if you have any problems. Where can you learn more? Go to http://sarah beth-srinivas.info/  Enter C304 in the search box to learn more about \"Low Blood Pressure: Care Instructions. \"  Current as of: December 16, 2019               Content Version: 12.5  © 1483-6791 Healthwise, [employer]. Care instructions adapted under license by [employer] (which disclaims liability or warranty for this information). If you have questions about a medical condition or this instruction, always ask your healthcare professional. [place] [address] any warranty or liability for your use of this information.          Patient Education        Nausea and Vomiting: Care Instructions  Your Care Instructions     When you are nauseated, you may feel weak and sweaty and notice a lot of saliva in your mouth. Nausea often leads to vomiting. Most of the time you do not need to worry about nausea and vomiting, but they can be signs of other illnesses. Two common causes of nausea and vomiting are stomach flu and food poisoning. Nausea and vomiting from viral stomach flu will usually start to improve within 24 hours. Nausea and vomiting from food poisoning may last from 12 to 48 hours. The doctor has checked you carefully, but problems can develop later. If you notice any problems or new symptoms, get medical treatment right away. Follow-up care is a key part of your treatment and safety. Be sure to make and go to all appointments, and call your doctor if you are having problems. It's also a good idea to know your test results and keep a list of the medicines you take. How can you care for yourself at home? · To prevent dehydration, drink plenty of fluids, enough so that your urine is light yellow or clear like water. Choose water and other caffeine-free clear liquids until you feel better. If you have kidney, heart, or liver disease and have to limit fluids, talk with your doctor before you increase the amount of fluids you drink. · Rest in bed until you feel better. · When you are able to eat, try clear soups, mild foods, and liquids until all symptoms are gone for 12 to 48 hours. Other good choices include dry toast, crackers, cooked cereal, and gelatin dessert, such as Jell-O. When should you call for help? [id number] anytime you think you may need emergency care. For example, call if:  · You passed out (lost consciousness). Call your doctor now or seek immediate medical care if:  · You have symptoms of dehydration, such as:  ? Dry eyes and a dry mouth. ? Passing only a little dark urine. ? Feeling thirstier than usual.  · You have new or worsening belly pain. · You have a new or higher fever. · You vomit blood or what looks like coffee grounds.   Watch closely for changes in your health, and be sure to contact your doctor if:  · You have ongoing nausea and vomiting. · Your vomiting is getting worse. · Your vomiting lasts longer than 2 days. · You are not getting better as expected. Where can you learn more? Go to http://www.mercer.com/  Enter H591 in the search box to learn more about \"Nausea and Vomiting: Care Instructions. \"  Current as of: June 26, 2019               Content Version: 12.5  © 2761-2323 Healthwise, [employer]. Care instructions adapted under license by [employer] (which disclaims liability or warranty for this information). If you have questions about a medical condition or this instruction, always ask your healthcare professional. [address] any warranty or liability for your use of this information.

## 2020-08-02 NOTE — PROGRESS NOTES
8/2/2020 at 2:25 PM    Received concerned phone call from patient's daughter, Ms. Zapata, regarding patient's prescriptions. Patient's daughter states his lasix was never filled by [facility] d/t the prescription written was incomplete. Patient's daughter is also concerned that patient has been taking 25 mg PO hydralazine twice daily and the new script is 50 mg PO hydralazine twice daily and given low BP and ER visit yesterday, daughter is inquiring as to whether or not the dose should have been increased. Dr. Howie Anton paged. If no call back from Dr. Howie Anton will ask Ms. Zapata to call MD's office at [phone].

## 2020-08-03 ENCOUNTER — PATIENT OUTREACH (OUTPATIENT)
Dept: CASE MANAGEMENT | Age: 85
End: 2020-08-03

## 2020-08-03 ENCOUNTER — TELEPHONE (OUTPATIENT)
Dept: CARDIOLOGY CLINIC | Age: 85
End: 2020-08-03

## 2020-08-03 NOTE — TELEPHONE ENCOUNTER
Message   Received: Today   Message Contents   Ami Sloop, LPN    Caller: Unspecified (Today, 10:47 AM)               Scheduled for 8/4        Noted, thanks. Will close out encounter as pt has been scheduled. Will see if  wants to change any medications and then call daughter.

## 2020-08-03 NOTE — PROGRESS NOTES
8/3-9/3 - Care transitions  Shauna Kaur RN, [facility], [facility]  Care transitions nurse [phone]  [facility] Coordination Team    Call to and reached pt's daughter, Kailash Alonzo - who requests call for clarification on discharge meds - and changes - she called on call yesterday and talked to Dr. Selvin Taylor - who advised to hold meds and allow him to review - He or his office will call her today. CTN reviewed discharge from [id number] [facility] on 7/31 with pt's daughter and the events leading to ER visit on 8/1 -  CTN forwarded specific questions to Dr. Selvin Taylor - re: meds and follow up -to Kip Cruz and office nurse. BP 8/2 122/70, pulse 80  BP 8/3 129/89, pulse 85 - (this with no meds for 24 hours)    Patient was admitted to [facility] on 7/31 and discharged on 8/1/2020  for hypertension, weakness, heart block. Patient was contacted within 1 business days of discharge. Top Discharge Challenges to be reviewed by the provider   Additional needs identified to be addressed with provider yes    Clarification of medication regimen - staff message to cardiology    Discussed COVID-19 related testing which was not done at this time. Test results were not done. Patient informed of results, if available? N/a    Method of communication with provider : staff message       Goals      Knowledge and adherence of prescribed medication (ie. action, side effects, missed dose, etc.).      8/3/2020  1 - Metoprolol started - continue every day? 2 - Hydralazine - previous dose was 25 mg bid - AVS had 50 mg bid - desired dose? 3 - Lasix 40 mg - [facility] pharmacy needs administration instructions - they have not filled - pt has not started   HCTZ stopped. Pt has not needed Zofran.     Pt presented to ER on 8/1 with hypotension and nausea -   ON call cardiology advised to hold meds - for him to review -  Daughter called office 8/3 -   CTN did follow up with daughter and the above questions to MD -  MD office to call pt for clarification. ttk       Returns to baseline activity level. 8/3/20    Discussed with daughter benefits of daily am weight and record. MOnitor bp am/ pm for medication efficacy - evaluation. Daily temperature - post PPI and Covid. ttk        Advance Care Planning:   Does patient have an Advance Directive:  reviewed and current     Inpatient Readmission Risk score: 10%  Was this a readmission? no   Patient stated reason for the admission: slow heart rate, weak, dizzy  Patients top risk factors for readmission: functional physical ability, lack of knowledge about disease, medical condition, medication management and utilization of services  Interventions to address risk factors: REview of medication questions with daughter and message to provider    Care Transition Nurse (CTN) contacted the family by telephone to perform post hospital discharge assessment. Verified name and  with family as identifiers. Provided introduction to self, and explanation of the CTN role. CTN reviewed discharge instructions, medical action plan and red flags with family who verbalized understanding. Family given an opportunity to ask questions and does not have any further questions or concerns at this time. The family agrees to contact the PCP office for questions related to their healthcare. Medication reconciliation was performed with family, who verbalizes understanding of administration of home medications. Advised obtaining a 90-day supply of all daily and as-needed medications.    Referral to Pharm D needed: no     Home Health/Outpatient orders at discharge: [address]: n/a  Date of initial visit: [facility] ordered at discharge: none  [address] received: n/a    Covid Risk Education    Patient has following risk factors of: heart failure/ post PPI    Education provided regarding infection prevention, and signs and symptoms of COVID-19 and when to seek medical attention with family who verbalized understanding. Discussed exposure protocols and quarantine From CDC: Are you at higher risk for severe illness?  and given an opportunity for questions and concerns. The family agrees to contact the COVID-19 hotline [phone] or PCP office for questions related to COVID-19. For more information on steps you can take to protect yourself, see CDC's How to Protect Yourself     Patient/family/caregiver given information for Amy Reynoso and agrees to enroll yes  Patient's preferred e-mail: [email or web address]  Patient's preferred phone number: [phone]    Discussed follow-up appointments. If no appointment was previously scheduled, appointment scheduling offered: yes  [address] follow up appointment(s):   Future Appointments   Date Time Provider Jyoti Reis   8/24/2020 11:15 AM Reginaldo Elliott MD [facility] BS AMB   8/24/2020 11:40 AM EMA, DOREEN [facility] BS AMB     Non-[facility] follow up appointment(s): none noted    Plan for follow-up call in 5-7 days based on severity of symptoms and risk factors. CTN provided contact information for future needs.     Gricel Martinez RN, [facility], [facility]  Care transitions nurse [phone]  [facility] Coordination Team

## 2020-08-03 NOTE — TELEPHONE ENCOUNTER
----- Message from Tyesha Horton MD sent at 8/3/2020  8:39 AM EDT -----  Viktor Sethi can you reach out to him. Got call over the weekend. Was at hospital recently and there has been confusion about his meds. Came back to ER. He needs to follow up with NP this week for med adjustment. Home number mail box is full. Left message on cell number to call me back.

## 2020-08-03 NOTE — TELEPHONE ENCOUNTER
Please call daughter Kelin Stoll (listed on HIPAA)    [phone]      Do not call patient he is hard of hearing and wants you to call daughter.         Thanks  Marlo Mejía

## 2020-08-04 ENCOUNTER — APPOINTMENT (OUTPATIENT)
Dept: CARDIOLOGY CLINIC | Age: 85
End: 2020-08-04

## 2020-08-04 ENCOUNTER — OFFICE VISIT (OUTPATIENT)
Dept: CARDIOLOGY CLINIC | Age: 85
End: 2020-08-04
Payer: MEDICARE

## 2020-08-04 VITALS
BODY MASS INDEX: 29.43 KG/M2 | WEIGHT: 172.4 LBS | DIASTOLIC BLOOD PRESSURE: 68 MMHG | RESPIRATION RATE: 18 BRPM | HEIGHT: 64 IN | OXYGEN SATURATION: 98 % | SYSTOLIC BLOOD PRESSURE: 136 MMHG | HEART RATE: 86 BPM

## 2020-08-04 DIAGNOSIS — R06.02 SOB (SHORTNESS OF BREATH): ICD-10-CM

## 2020-08-04 DIAGNOSIS — I50.32 CHRONIC DIASTOLIC CONGESTIVE HEART FAILURE (HCC): Primary | ICD-10-CM

## 2020-08-04 DIAGNOSIS — I10 ESSENTIAL HYPERTENSION: ICD-10-CM

## 2020-08-04 DIAGNOSIS — N28.9 RENAL INSUFFICIENCY: ICD-10-CM

## 2020-08-04 DIAGNOSIS — I44.2 CHB (COMPLETE HEART BLOCK) (HCC): ICD-10-CM

## 2020-08-04 DIAGNOSIS — Z95.0 PACEMAKER: ICD-10-CM

## 2020-08-04 PROCEDURE — 99214 OFFICE O/P EST MOD 30 MIN: CPT | Performed by: INTERNAL MEDICINE

## 2020-08-04 PROCEDURE — G8419 CALC BMI OUT NRM PARAM NOF/U: HCPCS | Performed by: INTERNAL MEDICINE

## 2020-08-04 PROCEDURE — 93010 ELECTROCARDIOGRAM REPORT: CPT | Performed by: INTERNAL MEDICINE

## 2020-08-04 PROCEDURE — G8427 DOCREV CUR MEDS BY ELIG CLIN: HCPCS | Performed by: INTERNAL MEDICINE

## 2020-08-04 PROCEDURE — 1101F PT FALLS ASSESS-DOCD LE1/YR: CPT | Performed by: INTERNAL MEDICINE

## 2020-08-04 PROCEDURE — 1111F DSCHRG MED/CURRENT MED MERGE: CPT | Performed by: INTERNAL MEDICINE

## 2020-08-04 PROCEDURE — G8536 NO DOC ELDER MAL SCRN: HCPCS | Performed by: INTERNAL MEDICINE

## 2020-08-04 PROCEDURE — G8510 SCR DEP NEG, NO PLAN REQD: HCPCS | Performed by: INTERNAL MEDICINE

## 2020-08-04 RX ORDER — FUROSEMIDE 20 MG/1
20 TABLET ORAL DAILY
Qty: 90 TAB | Refills: 1
Start: 2020-08-04 | End: 2020-08-04 | Stop reason: SDUPTHER

## 2020-08-04 RX ORDER — FUROSEMIDE 20 MG/1
20 TABLET ORAL DAILY
Qty: 90 TAB | Refills: 1 | Status: SHIPPED | OUTPATIENT
Start: 2020-08-04 | End: 2020-12-14

## 2020-08-04 RX ORDER — ACETAMINOPHEN 325 MG/1
TABLET ORAL
COMMUNITY
End: 2020-08-24

## 2020-08-04 NOTE — PROGRESS NOTES
1. Have you been to the ER, urgent care clinic since your last visit? Hospitalized since your last visit? 7- [facility] ER CP, SOB, 8-1-2020 [facility] ER, low BP.    2. Have you seen or consulted any other health care providers outside of the [facility] since your last visit? Include any pap smears or colon screening.  No

## 2020-08-04 NOTE — PROGRESS NOTES
[address] [place] [address] [address] [facility]  [phone]     Subjective:      OLESYA Cerda is a 80 y.o. male is here for post hospital and ED follow up. Initially admitted 7/30-8/1/2020 for acute on chronic diastolic failure, found to have CHB /SSS and underwent pacemaker insertion. Patient went back to ED 8/1/2020 afternoon Due low BP at home and some dizziness. Received IVF bolus. Was advised to hold Lasix and Toprol XL. Daughter restarted Hydralazine 25 mg BID yesterday due to high BP readings. Symptoms wise, pt reports mild sob and bilateral leg swelling. Wt has been stable. The patient denies chest pain, orthopnea, PND, palpitations, syncope, or presyncope. Patient Active Problem List    Diagnosis Date Noted    CHB (complete heart block) ([facility] 07/31/2020    Congestive heart failure ([facility] 07/30/2020    CHF (congestive heart failure) ([facility] 07/30/2020    Bilateral carotid artery stenosis 07/18/2019    Essential hypertension 07/18/2019    Hypercholesterolemia 06/17/2019    Mobitz type 1 second degree AV block 08/31/2017    PVC (premature ventricular contraction) 08/29/2017    Bradycardia 08/29/2017    AV block, 1st degree 08/29/2017    Right groin pain 07/09/2013      Tawana Weber MD  Past Medical History:   Diagnosis Date    Abdominal pain     Dyspepsia and other specified disorders of function of stomach     GERD (gastroesophageal reflux disease)     Hypercholesterolemia     Thyroid disease       Past Surgical History:   Procedure Laterality Date    HX CATARACT REMOVAL      bilateral    HX CATARACT REMOVAL      HX CHOLECYSTECTOMY      HX GI  2010    colon surgery?     HX HERNIA REPAIR      HX OPEN CHOLECYSTECTOMY      HX ORTHOPAEDIC      ingrown left toenail-great toe    HX OTHER SURGICAL      colonoscopy    HX TONSILLECTOMY      CA INS NEW/RPLCMT PRM PM W/TRANSV ELTRD ATRIAL&VENT N/A 7/31/2020    INSERT PPM DUAL performed by Josefa Bach MD KWAME at [facility]     Allergies   Allergen Reactions    Morphine Nausea and Vomiting    Morphine Nausea and Vomiting    Pcn [Penicillins] Rash and Swelling    Pcn [Penicillins] Rash      Family History   Problem Relation Age of Onset    Heart Disease Mother     Heart Disease Father       Social History     Socioeconomic History    Marital status:      Spouse name: Not on file    Number of children: Not on file    Years of education: Not on file    Highest education level: Not on file   Occupational History    Not on file   Social Needs    Financial resource strain: Not on file    Food insecurity     Worry: Not on file     Inability: Not on file    Transportation needs     Medical: Not on file     Non-medical: Not on file   Tobacco Use    Smoking status: Never Smoker    Smokeless tobacco: Never Used   Substance and Sexual Activity    Alcohol use: Yes     Comment: social    Drug use: Unknown     Types: Prescription    Sexual activity: Not on file   Lifestyle    Physical activity     Days per week: Not on file     Minutes per session: Not on file    Stress: Not on file   Relationships    Social connections     Talks on phone: Not on file     Gets together: Not on file     Attends Mosque service: Not on file     Active member of club or organization: Not on file     Attends meetings of clubs or organizations: Not on file     Relationship status: Not on file    Intimate partner violence     Fear of current or ex partner: Not on file     Emotionally abused: Not on file     Physically abused: Not on file     Forced sexual activity: Not on file   Other Topics Concern    Not on file   Social History Narrative    ** Merged History Encounter **           Current Outpatient Medications   Medication Sig    acetaminophen (TylenoL) 325 mg tablet Take  by mouth every four (4) hours as needed for Pain.  hydrALAZINE (APRESOLINE) 50 mg tablet Take 1 Tab by mouth two (2) times a day. (Patient taking differently: Take 25 mg by mouth two (2) times a day.)    omeprazole (PRILOSEC) 20 mg capsule Take 20 mg by mouth daily.  colestipol (COLESTID) 1 gram tablet Take 1 g by mouth two (2) times a day. Separate 1-2 hours from other drugs. Max: 4 tabs    levothyroxine (SYNTHROID) 100 mcg tablet Take 100 mcg by mouth daily (before breakfast).  ondansetron (Zofran ODT) 4 mg disintegrating tablet Take 1 Tab by mouth every eight (8) hours as needed for Nausea.  furosemide (LASIX) 40 mg tablet Patient has diastolic HF  Indications: visible water retention    metoprolol succinate (TOPROL-XL) 50 mg XL tablet Take 1 Tab by mouth daily. No current facility-administered medications for this visit. Review of Symptoms:  11 systems reviewed, negative other than as stated in the HPI    Physical ExamPhysical Exam:    Vitals:    08/04/20 1135   BP: 136/68   Pulse: 86   Resp: 18   SpO2: 98%   Weight: 172 lb 6.4 oz (78.2 kg)   Height: 5' 4\" (1.626 m)     Body mass index is 29.59 kg/m². General PE  Gen:  NAD  Mental Status - Alert. General Appearance - Not in acute distress. HEENT:  PERRL, no carotid bruits or JVD  Chest and Lung Exam   Inspection: Accessory muscles - No use of accessory muscles in breathing. Auscultation:   Breath sounds: - Normal.   Cardiovascular   Inspection: Jugular vein - Bilateral - Inspection Normal.   Palpation/Percussion:   Apical Impulse: - Normal.   Auscultation: Rhythm - Regular. Heart Sounds - S1 WNL and S2 WNL. No S3 or S4. Murmurs & Other Heart Sounds: Auscultation of the heart reveals - No Murmurs. Peripheral Vascular   Upper Extremity: Inspection - Bilateral - No Cyanotic nailbeds or Digital clubbing. Lower Extremity:   Palpation: Edema - Bilateral - + 2 pitting   Abdomen:   Soft, non-tender, bowel sounds are active.   Neuro: A&O times 3, CN and motor grossly WNL    Labs:   Lab Results   Component Value Date/Time    Cholesterol, total 194 06/13/2018 03:27 PM    HDL Cholesterol 51 06/13/2018 03:27 PM    LDL, calculated 114 (H) 06/13/2018 03:27 PM    Triglyceride 143 06/13/2018 03:27 PM     Lab Results   Component Value Date/Time     07/30/2020 10:40 AM     Lab Results   Component Value Date/Time    Sodium 137 08/01/2020 07:42 PM    Potassium 4.5 08/01/2020 07:42 PM    Chloride 104 08/01/2020 07:42 PM    CO2 30 08/01/2020 07:42 PM    Anion gap 3 (L) 08/01/2020 07:42 PM    Glucose 108 (H) 08/01/2020 07:42 PM    BUN 35 (H) 08/01/2020 07:42 PM    Creatinine 2.09 (H) 08/01/2020 07:42 PM    BUN/Creatinine ratio 17 08/01/2020 07:42 PM    GFR est AA 36 (L) 08/01/2020 07:42 PM    GFR est non-AA 30 (L) 08/01/2020 07:42 PM    Calcium 8.9 08/01/2020 07:42 PM    Bilirubin, total 1.2 (H) 08/01/2020 07:42 PM    Alk. phosphatase 104 08/01/2020 07:42 PM    Protein, total 7.5 08/01/2020 07:42 PM    Albumin 3.9 08/01/2020 07:42 PM    Globulin 3.6 08/01/2020 07:42 PM    A-G Ratio 1.1 08/01/2020 07:42 PM    ALT (SGPT) 26 08/01/2020 07:42 PM       EKG:  AV Paced     Assessment:     Assessment:      1. Chronic diastolic congestive heart failure ([address]    2. SOB (shortness of breath)    3. Essential hypertension    4. CHB (complete heart block) (HCC)    5. Pacemaker        Orders Placed This Encounter    AMB POC EKG ROUTINE W/ 12 LEADS, INTER & REP     Order Specific Question:   Reason for Exam:     Answer:   routine    acetaminophen (TylenoL) 325 mg tablet     Sig: Take  by mouth every four (4) hours as needed for Pain.         Plan:     HFpEF  Normal EF mild MR per echo 7/2020  NT pro-BNP 6,962 7/30/2020, still some pitting edema on exam  Continue daily weight, call for wt gain 2-3 lbs overnight  Restart Furosemide at lower dose--20 mg daily, repeat BMP 2weeks    HTN  Already restarted Hydralazine 25 mg BID  Adding low dose lasix  Continue to hold Toprol XL, will restart if needed     CHB/SSS  S/p pacemaker on 07/31  TSH wnl   F/u with Dr Sofia Marshall as planned     CKD 3  Cr 2.09    Continue current care and f/u in 1 mos with labs for BP check Clesimeoneen Been) and 6 mos with Dr Esteban Gonzalez NP       Patient seen and examined by me with nurse practitioner. I personally performed all components of the history, physical, and medical decision making and agree with the assessment and plan as noted. F/u labs.      Irena Mcdaniel MD

## 2020-08-19 ENCOUNTER — HOSPITAL ENCOUNTER (OUTPATIENT)
Dept: LAB | Age: 85
Discharge: HOME | End: 2020-08-19
Payer: MEDICARE

## 2020-08-19 DIAGNOSIS — I50.32 CHRONIC DIASTOLIC CONGESTIVE HEART FAILURE (HCC): ICD-10-CM

## 2020-08-19 DIAGNOSIS — R06.02 SOB (SHORTNESS OF BREATH): ICD-10-CM

## 2020-08-19 DIAGNOSIS — I44.2 CHB (COMPLETE HEART BLOCK) (HCC): ICD-10-CM

## 2020-08-19 DIAGNOSIS — Z95.0 PACEMAKER: ICD-10-CM

## 2020-08-19 DIAGNOSIS — I10 ESSENTIAL HYPERTENSION: ICD-10-CM

## 2020-08-19 PROCEDURE — 80048 BASIC METABOLIC PNL TOTAL CA: CPT

## 2020-08-19 PROCEDURE — 36415 COLL VENOUS BLD VENIPUNCTURE: CPT

## 2020-08-20 LAB
BUN SERPL-MCNC: 30 MG/DL (ref 10–36)
BUN/CREAT SERPL: 17 (ref 10–24)
CALCIUM SERPL-MCNC: 9.3 MG/DL (ref 8.6–10.2)
CHLORIDE SERPL-SCNC: 102 MMOL/L (ref 96–106)
CO2 SERPL-SCNC: 20 MMOL/L (ref 20–29)
CREAT SERPL-MCNC: 1.76 MG/DL (ref 0.76–1.27)
GLUCOSE SERPL-MCNC: 171 MG/DL (ref 65–99)
INTERPRETATION: NORMAL
POTASSIUM SERPL-SCNC: 4.4 MMOL/L (ref 3.5–5.2)
SODIUM SERPL-SCNC: 138 MMOL/L (ref 134–144)

## 2020-08-23 NOTE — PROGRESS NOTES
Kidney fxn stable( if not slightly better) on low dose of furosemide, ok to continue current therapy

## 2020-08-24 ENCOUNTER — CLINICAL SUPPORT (OUTPATIENT)
Dept: CARDIOLOGY CLINIC | Age: 85
End: 2020-08-24
Payer: MEDICARE

## 2020-08-24 ENCOUNTER — TELEPHONE (OUTPATIENT)
Dept: CARDIOLOGY CLINIC | Age: 85
End: 2020-08-24

## 2020-08-24 ENCOUNTER — OFFICE VISIT (OUTPATIENT)
Dept: CARDIOLOGY CLINIC | Age: 85
End: 2020-08-24
Payer: MEDICARE

## 2020-08-24 VITALS
OXYGEN SATURATION: 97 % | BODY MASS INDEX: 28.73 KG/M2 | DIASTOLIC BLOOD PRESSURE: 80 MMHG | HEART RATE: 90 BPM | WEIGHT: 168.3 LBS | SYSTOLIC BLOOD PRESSURE: 140 MMHG | HEIGHT: 64 IN

## 2020-08-24 DIAGNOSIS — E78.2 MIXED HYPERLIPIDEMIA: ICD-10-CM

## 2020-08-24 DIAGNOSIS — I65.23 BILATERAL CAROTID ARTERY STENOSIS: ICD-10-CM

## 2020-08-24 DIAGNOSIS — I44.2 CHB (COMPLETE HEART BLOCK) (HCC): ICD-10-CM

## 2020-08-24 DIAGNOSIS — Z95.0 PACEMAKER: ICD-10-CM

## 2020-08-24 DIAGNOSIS — I10 ESSENTIAL HYPERTENSION: ICD-10-CM

## 2020-08-24 DIAGNOSIS — I44.0 AV BLOCK, 1ST DEGREE: ICD-10-CM

## 2020-08-24 DIAGNOSIS — I50.32 CHRONIC DIASTOLIC CONGESTIVE HEART FAILURE (HCC): ICD-10-CM

## 2020-08-24 DIAGNOSIS — I10 ESSENTIAL HYPERTENSION: Primary | ICD-10-CM

## 2020-08-24 DIAGNOSIS — Z95.0 PACEMAKER: Primary | ICD-10-CM

## 2020-08-24 PROCEDURE — 93280 PM DEVICE PROGR EVAL DUAL: CPT | Performed by: INTERNAL MEDICINE

## 2020-08-24 PROCEDURE — 1111F DSCHRG MED/CURRENT MED MERGE: CPT | Performed by: INTERNAL MEDICINE

## 2020-08-24 PROCEDURE — G8432 DEP SCR NOT DOC, RNG: HCPCS | Performed by: INTERNAL MEDICINE

## 2020-08-24 PROCEDURE — G8427 DOCREV CUR MEDS BY ELIG CLIN: HCPCS | Performed by: INTERNAL MEDICINE

## 2020-08-24 PROCEDURE — G8536 NO DOC ELDER MAL SCRN: HCPCS | Performed by: INTERNAL MEDICINE

## 2020-08-24 PROCEDURE — 99214 OFFICE O/P EST MOD 30 MIN: CPT | Performed by: INTERNAL MEDICINE

## 2020-08-24 PROCEDURE — G8419 CALC BMI OUT NRM PARAM NOF/U: HCPCS | Performed by: INTERNAL MEDICINE

## 2020-08-24 PROCEDURE — 1101F PT FALLS ASSESS-DOCD LE1/YR: CPT | Performed by: INTERNAL MEDICINE

## 2020-08-24 PROCEDURE — G0463 HOSPITAL OUTPT CLINIC VISIT: HCPCS | Performed by: INTERNAL MEDICINE

## 2020-08-24 NOTE — PROGRESS NOTES
Chief Complaint   Patient presents with    Follow-up    CHF       1. Have you been to the ER, urgent care clinic since your last visit? Hospitalized since your last visit? No    2. Have you seen or consulted any other health care providers outside of the [address] [place] [address] since your last visit? Include any pap smears or colon screening. No    Patient C/O SOB ,chest pressure  and nausea with medications with unsteadiness.

## 2020-08-24 NOTE — TELEPHONE ENCOUNTER
----- Message from Beto Gray NP sent at 8/23/2020  6:07 PM EDT -----  Kidney fxn stable( if not slightly better) on low dose of furosemide, ok to continue current therapy      Called pt and left message to call me back.

## 2020-08-24 NOTE — PROGRESS NOTES
Subjective/HPI:     OLESYA Crum is a 80 y.o. male is here for routine f/u. He has a PMHx of sinus bradycardia with 1st degree AV block, HLD and GERD. At last visit, we started furosemide and hydralazine for elevated BP and increased LE edema. Today, wt down 4 lbs. He reports improved sob though he has some sob when he is anxious, leg swelling has resolved. He endorses \"unsteadiness\" / intermittent lightheadedness but denies any syncopal episode. PCP Provider  Yair Milian MD    Past Medical History:   Diagnosis Date    Abdominal pain     Dyspepsia and other specified disorders of function of stomach     GERD (gastroesophageal reflux disease)     Hypercholesterolemia     Thyroid disease         Allergies   Allergen Reactions    Morphine Nausea and Vomiting    Morphine Nausea and Vomiting    Pcn [Penicillins] Rash and Swelling    Pcn [Penicillins] Rash        Outpatient Encounter Medications as of 8/24/2020   Medication Sig Dispense Refill    acetaminophen (TylenoL) 325 mg tablet Take  by mouth every four (4) hours as needed for Pain.  furosemide (LASIX) 20 mg tablet Take 1 Tab by mouth daily. Patient has diastolic HF  Indications: visible water retention 90 Tab 1    ondansetron (Zofran ODT) 4 mg disintegrating tablet Take 1 Tab by mouth every eight (8) hours as needed for Nausea. 10 Tab 0    hydrALAZINE (APRESOLINE) 50 mg tablet Take 1 Tab by mouth two (2) times a day. (Patient taking differently: Take 25 mg by mouth two (2) times a day.) 60 Tab 11    metoprolol succinate (TOPROL-XL) 50 mg XL tablet Take 1 Tab by mouth daily. 30 Tab 11    omeprazole (PRILOSEC) 20 mg capsule Take 20 mg by mouth daily.  colestipol (COLESTID) 1 gram tablet Take 1 g by mouth two (2) times a day. Separate 1-2 hours from other drugs. Max: 4 tabs      levothyroxine (SYNTHROID) 100 mcg tablet Take 100 mcg by mouth daily (before breakfast).        No facility-administered encounter medications on file as of 8/24/2020. Review of Symptoms:    Review of Systems   Constitutional: Negative for chills, fever and weight loss. HENT: Negative for nosebleeds. Eyes: Negative for blurred vision and double vision. Respiratory: Negative for cough, shortness of breath and wheezing. Cardiovascular: Negative for chest pain, palpitations, orthopnea, leg swelling and PND. Gastrointestinal: Negative for abdominal pain, blood in stool, diarrhea, nausea and vomiting. Musculoskeletal: Negative for joint pain. Skin: Negative for rash. Neurological: Positive for dizziness. Negative for tingling and loss of consciousness. Endo/Heme/Allergies: Does not bruise/bleed easily. Physical Exam:      General: Well developed, in no acute distress, cooperative and alert  HEENT: No carotid bruits, no JVD, trach is midline. Neck Supple, PEERL, EOM intact. Heart:  reg rate and rhythm; normal S1/S2; no murmurs, no gallops or rubs. Respiratory: Clear bilaterally x 4, no wheezing or rales  Abdomen:   Soft, non-tender, no distention, no masses. + BS. Extremities:  Normal cap refill, no cyanosis, atraumatic. No edema. Neuro: A&Ox3, speech clear, gait stable. Skin: Skin color is normal. No rashes or lesions.  Non diaphoretic  Vascular: 2+ pulses symmetric in all extremities    Vitals:    08/24/20 1142   Pulse: 90   SpO2: 97%   Weight: 168 lb 4.8 oz (76.3 kg)   Height: 5' 4\" (1.626 m)       ECG:     Cardiology Labs:    Lab Results   Component Value Date/Time    Cholesterol, total 194 06/13/2018 03:27 PM    HDL Cholesterol 51 06/13/2018 03:27 PM    LDL, calculated 114 (H) 06/13/2018 03:27 PM    VLDL, calculated 29 06/13/2018 03:27 PM       No results found for: HBA1C, KTB1MLJC, NRO6RWKH, EJA9UHZL    Lab Results   Component Value Date/Time    Sodium 138 08/19/2020 02:13 PM    Potassium 4.4 08/19/2020 02:13 PM    Chloride 102 08/19/2020 02:13 PM    CO2 20 08/19/2020 02:13 PM    Glucose 171 (H) 08/19/2020 02:13 PM    BUN 30 08/19/2020 02:13 PM    Creatinine 1.76 (H) 08/19/2020 02:13 PM    BUN/Creatinine ratio 17 08/19/2020 02:13 PM    GFR est AA 38 (L) 08/19/2020 02:13 PM    GFR est non-AA 33 (L) 08/19/2020 02:13 PM    Calcium 9.3 08/19/2020 02:13 PM    Anion gap 3 (L) 08/01/2020 07:42 PM    Bilirubin, total 1.2 (H) 08/01/2020 07:42 PM    ALT (SGPT) 26 08/01/2020 07:42 PM    Alk. phosphatase 104 08/01/2020 07:42 PM    Protein, total 7.5 08/01/2020 07:42 PM    Albumin 3.9 08/01/2020 07:42 PM    Globulin 3.6 08/01/2020 07:42 PM    A-G Ratio 1.1 08/01/2020 07:42 PM          Assessment:       ICD-10-CM ICD-9-CM    1. Essential hypertension  I10 401.9    2. Chronic diastolic congestive heart failure (HCC)  I50.32 428.32      428.0    3. Bilateral carotid artery stenosis  I65.23 433.10      433.30    4. AV block, 1st degree  I44.0 426.11    5. Mixed hyperlipidemia  E78.2 272.2    6. Pacemaker  Z95.0 V45.01         Plan:     1. Essential hypertension  Continue Hydralazine 25 mg BID  Continue to hold Toprol; continue Lasix    2. HFpEF  Normal EF mild MR per echo 7/2020  Weight down 4 lbs  since starting lasix -- continue same dose, renal function remains at baseline if not betetr  Discussed daily weights, may take additional dose of Lasix for weight gain > 3 lbs/day or > 6 lbs/week  Encouraged sodium restricted diet  Will check BMP in 3 mos     3. Bilateral carotid artery stenosis  Carotid duplex in 6/2019 with bilateral (50-69%) stenosis. Repeat carotid duplex  Continue with Colestipol     4. CHB/SSS S/p pacemaker on 07/31/2020  Followed by Dr Lester Vila     5. Hypercholesterolemia  6/18 LDL at 114 Labs and lipids per PCP    Continue current therapy and f/u in 6 mos, sooner if needed      Patient seen and examined by me with nurse practitioner. I personally performed all components of the history, physical, and medical decision making and agree with the assessment and plan as noted.     [address] Facundo Roberson, MD

## 2020-08-24 NOTE — TELEPHONE ENCOUNTER
Pt was seen in office today. Labs were gone over with the pt in office today. Will close out this encounter.

## 2020-08-24 NOTE — PROGRESS NOTES
Patient feels well following pacemaker implantation. Subclavian pacemaker site approximated well. Small amount of bloody oozing from small skin tear. Anti-microbial ointment and band-aid applied. No erythema or heat. Device interrogation WNL. Follow up as planned in 3 mo.      Marily Rob RN

## 2020-09-01 ENCOUNTER — ANCILLARY PROCEDURE (OUTPATIENT)
Dept: CARDIOLOGY CLINIC | Age: 85
End: 2020-09-01
Payer: MEDICARE

## 2020-09-01 PROCEDURE — 93880 EXTRACRANIAL BILAT STUDY: CPT | Performed by: INTERNAL MEDICINE

## 2020-09-02 ENCOUNTER — TELEPHONE (OUTPATIENT)
Dept: CARDIOLOGY CLINIC | Age: 85
End: 2020-09-02

## 2020-09-02 LAB
LEFT BULB EDV: 37 CM/S
LEFT BULB PSV: 195 CM/S
LEFT CCA DIST DIAS: 15 CM/S
LEFT CCA DIST SYS: 61 CM/S
LEFT CCA MID DIAS: 13 CM/S
LEFT CCA MID SYS: 66 CM/S
LEFT CCA PROX DIAS: 15 CM/S
LEFT CCA PROX SYS: 80 CM/S
LEFT ECA DIAS: 15 CM/S
LEFT ECA SYS: 208 CM/S
LEFT ICA DIST DIAS: 30 CM/S
LEFT ICA DIST SYS: 105 CM/S
LEFT ICA MID DIAS: 28 CM/S
LEFT ICA MID SYS: 129 CM/S
LEFT ICA PROX DIAS: 26 CM/S
LEFT ICA PROX SYS: 172 CM/S
LEFT ICA/CCA SYS: 2.15
LEFT SUBCLAVIAN SYS: 77 CM/S
LEFT VERTEBRAL DIAS: 13 CM/S
LEFT VERTEBRAL SYS: 44 CM/S
RIGHT CCA DIST DIAS: 26 CM/S
RIGHT CCA DIST SYS: 83 CM/S
RIGHT CCA MID DIAS: 25 CM/S
RIGHT CCA MID SYS: 72 CM/S
RIGHT CCA PROX DIAS: 26 CM/S
RIGHT CCA PROX SYS: 83 CM/S
RIGHT ECA DIAS: 13 CM/S
RIGHT ECA SYS: 94 CM/S
RIGHT ICA DIST DIAS: 28 CM/S
RIGHT ICA DIST SYS: 88 CM/S
RIGHT ICA MID DIAS: 36 CM/S
RIGHT ICA MID SYS: 136 CM/S
RIGHT ICA PROX DIAS: 39 CM/S
RIGHT ICA PROX SYS: 204 CM/S
RIGHT ICA/CCA SYS: 2.45
RIGHT SUBCLAVIAN SYS: 85 CM/S
RIGHT VERTEBRAL DIAS: 12 CM/S
RIGHT VERTEBRAL SYS: 41 CM/S

## 2020-09-02 NOTE — TELEPHONE ENCOUNTER
----- Message from Augusta Flores MD sent at 9/2/2020  4:23 PM EDT -----  Inform him on carotid doppler, carotid stenosis is stable. Called pt,verified pt with two pt identifiers, advised pt his carotid doppler showed his carotid stenosis is stable. Pt verbalized understanding.

## 2020-09-14 ENCOUNTER — CLINICAL SUPPORT (OUTPATIENT)
Dept: CARDIOLOGY CLINIC | Age: 85
End: 2020-09-14
Payer: MEDICARE

## 2020-09-14 VITALS
WEIGHT: 170 LBS | OXYGEN SATURATION: 97 % | DIASTOLIC BLOOD PRESSURE: 80 MMHG | BODY MASS INDEX: 29.02 KG/M2 | RESPIRATION RATE: 16 BRPM | SYSTOLIC BLOOD PRESSURE: 152 MMHG | HEIGHT: 64 IN | HEART RATE: 86 BPM

## 2020-09-14 DIAGNOSIS — I10 ESSENTIAL HYPERTENSION: Primary | ICD-10-CM

## 2020-09-14 PROCEDURE — 90000 NO LOS: CPT | Performed by: NURSE PRACTITIONER

## 2020-09-14 NOTE — PROGRESS NOTES
1. Have you been to the ER, urgent care clinic since your last visit? Hospitalized since your last visit? No.    2. Have you seen or consulted any other health care providers outside of the [address] [place] [address] since your last visit? Include any pap smears or colon screening.    No.    Chief Complaint   Patient presents with    Hypertension     BP check          PT HAS NOT HAD MEDICATIONS THIS AM.

## 2020-10-16 DIAGNOSIS — I10 ESSENTIAL HYPERTENSION: ICD-10-CM

## 2020-10-16 RX ORDER — HYDROCHLOROTHIAZIDE 12.5 MG/1
TABLET ORAL
Qty: 90 TAB | Refills: 1 | Status: SHIPPED | OUTPATIENT
Start: 2020-10-16 | End: 2020-11-05

## 2020-10-30 NOTE — PROGRESS NOTES
12/5/2017 11:22 AM      Subjective:     OLESYA Ken is here for f/u visit. He denies chest pain, chest pressure/discomfort, dyspnea, palpitations, irregular heart beats, near-syncope, syncope, fatigue, orthopnea, paroxysmal nocturnal dyspnea, exertional chest pressure/discomfort, claudication, lower extremity edema. Visit Vitals    /80 (BP 1 Location: Right arm, BP Patient Position: Sitting)    Pulse (!) 30  Comment: irregular    Resp 16    Ht 5' 4\" (1.626 m)    Wt 172 lb 4.8 oz (78.2 kg)    SpO2 97%    BMI 29.58 kg/m2     Current Outpatient Prescriptions   Medication Sig    colestipol (COLESTID) 1 gram tablet Take 1 g by mouth three (3) times daily.  omeprazole (PRILOSEC) 20 mg capsule Take 20 mg by mouth daily.  levothyroxine (SYNTHROID) 100 mcg tablet Take 100 mcg by mouth daily (before breakfast). No current facility-administered medications for this visit. Objective:      Visit Vitals    /80 (BP 1 Location: Right arm, BP Patient Position: Sitting)    Pulse (!) 30    Resp 16    Ht 5' 4\" (1.626 m)    Wt 172 lb 4.8 oz (78.2 kg)    SpO2 97%    BMI 29.58 kg/m2       Data Review:     EKG: Normal sinus rhythm, 1st degree AV block, PVCs - vent bigeminy, unchanged    Reviewed and/or ordered active problem list, medication list tests    Past Medical History:   Diagnosis Date    Abdominal pain     Dyspepsia and other specified disorders of function of stomach     GERD (gastroesophageal reflux disease)     Hypercholesterolemia     Thyroid disease       Past Surgical History:   Procedure Laterality Date    HX CATARACT REMOVAL      bilateral    HX CATARACT REMOVAL      HX CHOLECYSTECTOMY      HX GI  2010    colon surgery?     HX HERNIA REPAIR      HX OPEN CHOLECYSTECTOMY      HX ORTHOPAEDIC      ingrown left toenail-great toe    HX OTHER SURGICAL      colonoscopy    HX TONSILLECTOMY       Allergies   Allergen Reactions    Morphine Please contact patient and inform of negative COVID-19 test. Thank you. Nausea and Vomiting    Morphine Nausea and Vomiting    Pcn [Penicillins] Rash and Swelling    Pcn [Penicillins] Rash      Family History   Problem Relation Age of Onset    Heart Disease Mother     Heart Disease Father       Social History     Social History    Marital status:      Spouse name: N/A    Number of children: N/A    Years of education: N/A     Occupational History    Not on file. Social History Main Topics    Smoking status: Never Smoker    Smokeless tobacco: Not on file    Alcohol use Yes      Comment: social    Drug use: Not on file    Sexual activity: Not on file     Other Topics Concern    Not on file     Social History Narrative    ** Merged History Encounter **            Review of Systems   General: Not Present- Dietary Changes, Fatigue and Medication Changes. Skin: Not Present- Bruising and Excessive Sweating. HEENT: Not Present- Headache, Visual Loss and Vertigo. Respiratory: Not Present- Cough, Decreased Exercise Tolerance and Wheezing. Cardiovascular: Not Present- Abnormal Blood Pressure, Chest Pain, Claudications, Difficulty Breathing Lying Down, Difficulty Breathing On Exertion, Edema, Fainting / Blacking Out, Irregular Heart Beat, Palpitations, Paroxysmal Nocturnal Dyspnea, Rapid Heart Rate and Shortness of Breath. Gastrointestinal: Not Present- Black, Tarry Stool, Bloody Stool, Diarrhea, Hematemesis, Rectal Bleeding and Vomiting. Musculoskeletal: Not Present- Muscle Pain and Muscle Weakness. Neurological: Not Present- Dizziness. Psychiatric: Not Present- Depression. Endocrine: Not Present- Cold Intolerance, Heat Intolerance and Thyroid Problems. Hematology: Not Present- Abnormal Bleeding, Anemia, Blood Clots and Easy Bruising.       Physical Exam   The physical exam findings are as follows:       General   Mental Status - Alert. General Appearance - Not in acute distress. Neck   Carotid Arteries - normal upstroke. No Bruits.       Chest and Lung Exam   Inspection: Accessory muscles - No use of accessory muscles in breathing. Auscultation:   Breath sounds: - Normal.      Cardiovascular   Inspection: Jugular vein - Bilateral - Inspection Normal.  Palpation/Percussion:   Apical Impulse: - Normal.  Auscultation: Rhythm - Regular. Heart Sounds - S1 WNL and S2 WNL. No S3 or S4. Murmurs & Other Heart Sounds: Auscultation of the heart reveals - No Murmurs. Abdomen   Palpation/Percussion: Palpation and Percussion of the abdomen reveal - No Palpable abdominal masses. Auscultation: Auscultation of the abdomen reveals - Bowel sounds normal.      Peripheral Vascular   Upper Extremity: Inspection - Bilateral - No Cyanotic nailbeds or Digital clubbing. Lower Extremity:   Palpation: Edema - Bilateral - No edema. Neurologic   Mental Status: Affect - normal.  Motor: - Normal. Gait - Normal.      Assessment:       ICD-10-CM ICD-9-CM    1. AV block, 1st degree I44.0 426.11    2. Bradycardia R00.1 427.89 AMB POC EKG ROUTINE W/ 12 LEADS, INTER & REP   3. PVC (premature ventricular contraction) I49.3 427.69        Plan:     1. Sinus bradycardia, 1st degree AV block. Continue to monitor. No -ve chronotropics. Normal LVEF.    2. Per pt FLP is done and monitor by PCP and he has an appt with PCP in Jan.

## 2020-11-03 ENCOUNTER — TELEPHONE (OUTPATIENT)
Dept: CARDIOLOGY CLINIC | Age: 85
End: 2020-11-03

## 2020-11-03 NOTE — TELEPHONE ENCOUNTER
HCTZ was stopped at hospital visit on 7/30/20. He was not restarted. You sent the script in on 10/16/20- I think the pharmacy generated it. He is not suppose to be on this medication is he? Please advise, thanks. Message   Received: Today   Message Contents   MD Michael Judd, LPN    Caller: Unspecified (2 days ago, 11:22 AM)               Yes stop it. Returned call and left message to call me back. Called pt and left 2nd message to call me back regarding his medication.

## 2020-11-03 NOTE — TELEPHONE ENCOUNTER
Please call patient regarding medication      Pharmacy sent over wrong medication refill request for     Hydrochlorothiazide he thought Dr Re Jason took him off of this medication.     [phone]    Thanks  Diana Wynn

## 2020-11-09 NOTE — TELEPHONE ENCOUNTER
Called pt,verified pt with two pt identifiers, advised pt that he is not to take the HCTZ. The pharmacy must have sent by mistake and pt is not to take the medication. Advised I will call  The pharmacy also. Pt is aware of not taking the med and verbalized understanding. Called pharmacy and advised pt is not taking the HCTZ and need to get off of pt's record. She advised she would deactivate it . She verbalized understanding.

## 2020-11-11 ENCOUNTER — PATIENT OUTREACH (OUTPATIENT)
Dept: CASE MANAGEMENT | Age: 85
End: 2020-11-11

## 2020-11-11 NOTE — ACP (ADVANCE CARE PLANNING)
11/11/20  Mr. Larry Francis has an advanced directive and a power of  noted from 10/18/2010     Healthcare decision makers noted -  Healthcare Decision Makers History     Show: [x]All emergency contacts    Active? Name [address] Relationship Legal Guardian?  Primary Home [address] [place] [address]              Duong Sena Daughter   [phone] [facility]) [phone]    Notify on Admission?: No  Attach Document       Geo Pruitt   [phone] [facility]) [phone]    Notify on Admission?: No  Attach Document       Farhana Reid Daughter   [phone] [facility]) [phone]    Notify on Admission?: No  Attach Document       Serene BARILLAS   [phone] [facility]) [phone]         Merlene Rae

## 2020-11-11 NOTE — PROGRESS NOTES
11/11/20 Care transitions    Mr. Declan Shetty had follow up with Dr. Sandra Camargo on 8/24 -   At that time he was adivsed to take:  Hydralazine 25 mg bid,   Hold Metoprolol -  Continue Lasix and could add additional Lasix if weight gain over 3 lbs 1 day, 6 lbs in a week -    He has the following upcoming cardiology appts-   Provider  Jyoti Reis    12/1/2020 11:00 AM  PACEMAKER, [facility]  BS AMB    12/1/2020 11:15 AM  Viv Connell MD  [facility]  BS AMB    3/4/2021 1:00 PM  Brandi Elliott MD  [facility]  BS AMB      OREN closed - no readmissions -   Goals addressed.     Juan Francisco Angeles RN, [facility], [facility]  Care transitions nurse [phone]  Joint [facility] between [facility] Resources Coordination Team

## 2020-11-16 ENCOUNTER — HOSPITAL ENCOUNTER (OUTPATIENT)
Dept: LAB | Age: 85
Discharge: HOME | End: 2020-11-16
Payer: MEDICARE

## 2020-11-16 DIAGNOSIS — I44.0 AV BLOCK, 1ST DEGREE: ICD-10-CM

## 2020-11-16 DIAGNOSIS — I50.32 CHRONIC DIASTOLIC CONGESTIVE HEART FAILURE (HCC): ICD-10-CM

## 2020-11-16 DIAGNOSIS — Z95.0 PACEMAKER: ICD-10-CM

## 2020-11-16 DIAGNOSIS — I10 ESSENTIAL HYPERTENSION: ICD-10-CM

## 2020-11-16 DIAGNOSIS — E78.2 MIXED HYPERLIPIDEMIA: ICD-10-CM

## 2020-11-16 DIAGNOSIS — I65.23 BILATERAL CAROTID ARTERY STENOSIS: ICD-10-CM

## 2020-11-16 PROCEDURE — 36415 COLL VENOUS BLD VENIPUNCTURE: CPT

## 2020-11-16 PROCEDURE — 80048 BASIC METABOLIC PNL TOTAL CA: CPT

## 2020-11-17 LAB
BUN SERPL-MCNC: 32 MG/DL (ref 10–36)
BUN/CREAT SERPL: 19 (ref 10–24)
CALCIUM SERPL-MCNC: 9.3 MG/DL (ref 8.6–10.2)
CHLORIDE SERPL-SCNC: 102 MMOL/L (ref 96–106)
CO2 SERPL-SCNC: 26 MMOL/L (ref 20–29)
CREAT SERPL-MCNC: 1.71 MG/DL (ref 0.76–1.27)
GLUCOSE SERPL-MCNC: 115 MG/DL (ref 65–99)
INTERPRETATION: NORMAL
POTASSIUM SERPL-SCNC: 4.5 MMOL/L (ref 3.5–5.2)
SODIUM SERPL-SCNC: 141 MMOL/L (ref 134–144)

## 2020-11-20 ENCOUNTER — TELEPHONE (OUTPATIENT)
Dept: CARDIOLOGY CLINIC | Age: 85
End: 2020-11-20

## 2020-11-20 NOTE — TELEPHONE ENCOUNTER
----- Message from Kulwant Hay MD sent at 11/17/2020 10:04 AM EST -----  Inform him labs are stable.

## 2020-12-01 ENCOUNTER — OFFICE VISIT (OUTPATIENT)
Dept: CARDIOLOGY CLINIC | Age: 85
End: 2020-12-01
Payer: MEDICARE

## 2020-12-01 ENCOUNTER — CLINICAL SUPPORT (OUTPATIENT)
Dept: CARDIOLOGY CLINIC | Age: 85
End: 2020-12-01
Payer: MEDICARE

## 2020-12-01 VITALS
WEIGHT: 166 LBS | BODY MASS INDEX: 28.34 KG/M2 | OXYGEN SATURATION: 98 % | SYSTOLIC BLOOD PRESSURE: 138 MMHG | RESPIRATION RATE: 18 BRPM | HEIGHT: 64 IN | DIASTOLIC BLOOD PRESSURE: 74 MMHG | HEART RATE: 70 BPM

## 2020-12-01 DIAGNOSIS — Z95.0 CARDIAC PACEMAKER IN SITU: Primary | ICD-10-CM

## 2020-12-01 DIAGNOSIS — I49.5 SSS (SICK SINUS SYNDROME) (HCC): ICD-10-CM

## 2020-12-01 DIAGNOSIS — E78.00 HYPERCHOLESTEROLEMIA: ICD-10-CM

## 2020-12-01 DIAGNOSIS — I10 ESSENTIAL HYPERTENSION: ICD-10-CM

## 2020-12-01 DIAGNOSIS — I44.2 CHB (COMPLETE HEART BLOCK) (HCC): Primary | ICD-10-CM

## 2020-12-01 DIAGNOSIS — I44.0 AV BLOCK, 1ST DEGREE: ICD-10-CM

## 2020-12-01 DIAGNOSIS — Z95.0 PACEMAKER: ICD-10-CM

## 2020-12-01 DIAGNOSIS — I44.2 CHB (COMPLETE HEART BLOCK) (HCC): ICD-10-CM

## 2020-12-01 PROCEDURE — 93010 ELECTROCARDIOGRAM REPORT: CPT | Performed by: INTERNAL MEDICINE

## 2020-12-01 PROCEDURE — 93280 PM DEVICE PROGR EVAL DUAL: CPT | Performed by: INTERNAL MEDICINE

## 2020-12-01 PROCEDURE — 1101F PT FALLS ASSESS-DOCD LE1/YR: CPT | Performed by: INTERNAL MEDICINE

## 2020-12-01 PROCEDURE — 93005 ELECTROCARDIOGRAM TRACING: CPT | Performed by: INTERNAL MEDICINE

## 2020-12-01 PROCEDURE — G8510 SCR DEP NEG, NO PLAN REQD: HCPCS | Performed by: INTERNAL MEDICINE

## 2020-12-01 PROCEDURE — G8419 CALC BMI OUT NRM PARAM NOF/U: HCPCS | Performed by: INTERNAL MEDICINE

## 2020-12-01 PROCEDURE — G0463 HOSPITAL OUTPT CLINIC VISIT: HCPCS | Performed by: INTERNAL MEDICINE

## 2020-12-01 PROCEDURE — G8427 DOCREV CUR MEDS BY ELIG CLIN: HCPCS | Performed by: INTERNAL MEDICINE

## 2020-12-01 PROCEDURE — G8536 NO DOC ELDER MAL SCRN: HCPCS | Performed by: INTERNAL MEDICINE

## 2020-12-01 PROCEDURE — 99214 OFFICE O/P EST MOD 30 MIN: CPT | Performed by: INTERNAL MEDICINE

## 2020-12-01 RX ORDER — MONTELUKAST SODIUM 4 MG/1
1 TABLET, CHEWABLE ORAL 2 TIMES DAILY
Qty: 60 TAB | Refills: 5 | Status: SHIPPED | OUTPATIENT
Start: 2020-12-01 | End: 2021-04-26

## 2020-12-01 NOTE — PROGRESS NOTES
ELECTROPHYSIOLOGY        Subjective:      OLESYA Young is a 80 y.o. male is here for EP follow up. The patient denies chest pain   orthopnea, PND, LE edema, palpitations, syncope, presyncope or fatigue. He endorses ALANIS. Patient Active Problem List    Diagnosis Date Noted    Pacemaker 08/04/2020    Renal insufficiency 08/04/2020    CHB (complete heart block) ([facility] [address] 07/31/2020    Congestive heart failure ([address] 07/30/2020    CHF (congestive heart failure) ([facility] [address] 07/30/2020    Bilateral carotid artery stenosis 07/18/2019    Essential hypertension 07/18/2019    Hypercholesterolemia 06/17/2019    Mobitz type 1 second degree AV block 08/31/2017    PVC (premature ventricular contraction) 08/29/2017    Bradycardia 08/29/2017    AV block, 1st degree 08/29/2017    Right groin pain 07/09/2013      Brad Dent MD  Past Medical History:   Diagnosis Date    Abdominal pain     Dyspepsia and other specified disorders of function of stomach     GERD (gastroesophageal reflux disease)     Hypercholesterolemia     Thyroid disease       Past Surgical History:   Procedure Laterality Date    HX CATARACT REMOVAL      bilateral    HX CATARACT REMOVAL      HX CHOLECYSTECTOMY      HX GI  2010    colon surgery?     HX HERNIA REPAIR      HX MALIGNANT SKIN LESION EXCISION Right 11/30/2020    right cheek     HX OPEN CHOLECYSTECTOMY      HX ORTHOPAEDIC      ingrown left toenail-great toe    HX OTHER SURGICAL      colonoscopy    HX TONSILLECTOMY      NV INS NEW/RPLCMT PRM PM W/TRANSV ELTRD ATRIAL&VENT N/A 7/31/2020    INSERT PPM DUAL performed by Gwendolyn Barone MD at [facility] CARDIAC CATH LAB     Allergies   Allergen Reactions    Morphine Nausea and Vomiting    Morphine Nausea and Vomiting    Pcn [Penicillins] Rash and Swelling    Pcn [Penicillins] Rash      Family History   Problem Relation Age of Onset    Heart Disease Mother     Heart Disease Father     negative for cardiac disease  Social History     Socioeconomic History    Marital status:      Spouse name: Not on file    Number of children: Not on file    Years of education: Not on file    Highest education level: Not on file   Tobacco Use    Smoking status: Never Smoker    Smokeless tobacco: Never Used   Substance and Sexual Activity    Alcohol use: Yes     Comment: social    Drug use: Unknown     Types: Prescription   Social History Narrative    ** Merged History Encounter **          Current Outpatient Medications   Medication Sig    furosemide (LASIX) 20 mg tablet Take 1 Tab by mouth daily. Patient has diastolic HF  Indications: visible water retention    hydrALAZINE (APRESOLINE) 50 mg tablet Take 1 Tab by mouth two (2) times a day. (Patient taking differently: Take 25 mg by mouth two (2) times a day.)    omeprazole (PRILOSEC) 20 mg capsule Take 20 mg by mouth daily.  colestipol (COLESTID) 1 gram tablet Take 1 g by mouth two (2) times a day. Separate 1-2 hours from other drugs. Max: 4 tabs    levothyroxine (SYNTHROID) 100 mcg tablet Take 100 mcg by mouth daily (before breakfast).  metoprolol succinate (TOPROL-XL) 50 mg XL tablet Take 1 Tab by mouth daily. (Patient taking differently: Take 50 mg by mouth daily. 1/2  Tab twice daily)     No current facility-administered medications for this visit. Vitals:    12/01/20 1148   BP: 138/74   Pulse: 70   Resp: 18   SpO2: 98%   Weight: 166 lb (75.3 kg)   Height: 5' 4\" (1.626 m)       I have reviewed the nurses notes, vitals, problem list, allergy list, medical history, family, social history and medications. Review of Symptoms:    General: Pt denies excessive weight gain or loss. Pt is able to conduct ADL's  HEENT: Denies blurred vision, headaches, epistaxis and difficulty swallowing. Respiratory: Reports shortness of breath, ALANIS,Denies wheezing or stridor.   Cardiovascular: Denies precordial pain, palpitations, edema or PND  Gastrointestinal: Denies poor appetite, indigestion, abdominal pain or blood in stool  Urinary: Denies dysuria, pyuria  Musculoskeletal: Denies pain or swelling from muscles or joints  Neurologic: Denies tremor, paresthesias, or sensory motor disturbance  Skin: Denies rash, itching or texture change. Psych: Denies depression      Physical Exam:      General: Well developed, in no acute distress. HEENT: Eyes - PERRL, no jvd  Heart:  Normal S1/S2 negative S3 or S4. Regular, no murmur, gallop or rub. Respiratory: Clear bilaterally x 4, no wheezing or rales  Extremities:  No edema, normal cap refill, no cyanosis. Musculoskeletal: No clubbing  Neuro: A&Ox3, speech clear, gait stable. Skin: Skin color is normal. No rashes or lesions. Non diaphoretic. no ulcers  Vascular: 2+ pulses symmetric in all extremities  Psych - judgement intact and orientation is wnl       Cardiographics    Ekg: V paced    Pacer - A paced 99%, v paced 99%    Results for orders placed or performed during the hospital encounter of 07/30/20   EKG, 12 LEAD, INITIAL   Result Value Ref Range    Ventricular Rate 84 BPM    Atrial Rate 84 BPM    P-R Interval 178 ms    QRS Duration 172 ms    Q-T Interval 464 ms    QTC Calculation (Bezet) 548 ms    Calculated P Axis 107 degrees    Calculated R Axis -80 degrees    Calculated T Axis 88 degrees    Diagnosis       AV dual-paced rhythm    Confirmed by Barb Guzman MD, Adeline Nascimento ([id number]) on 8/1/2020 2:56:19 PM       Echo:  · LV: Normal cavity size. Mild concentric hypertrophy. Mild-to-moderate systolic function. Estimated left ventricular ejection fraction is 55 - 60%. · RA: Moderately dilated right atrium. · MV: Mitral valve thickening. Mild mitral valve regurgitation is present. · PA: Mild to moderate pulmonary hypertension.     Lab Results   Component Value Date/Time    WBC 6.9 08/01/2020 07:42 PM    HGB 13.7 08/01/2020 07:42 PM    HCT 41.8 08/01/2020 07:42 PM    PLATELET 060 06/12/0070 07:42 PM    MCV 95.0 08/01/2020 07:42 PM      Lab Results Component Value Date/Time    Sodium 141 11/16/2020 04:26 PM    Potassium 4.5 11/16/2020 04:26 PM    Chloride 102 11/16/2020 04:26 PM    CO2 26 11/16/2020 04:26 PM    Anion gap 3 (L) 08/01/2020 07:42 PM    Glucose 115 (H) 11/16/2020 04:26 PM    BUN 32 11/16/2020 04:26 PM    Creatinine 1.71 (H) 11/16/2020 04:26 PM    BUN/Creatinine ratio 19 11/16/2020 04:26 PM    GFR est AA 39 (L) 11/16/2020 04:26 PM    GFR est non-AA 34 (L) 11/16/2020 04:26 PM    Calcium 9.3 11/16/2020 04:26 PM    Bilirubin, total 1.2 (H) 08/01/2020 07:42 PM    Alk. phosphatase 104 08/01/2020 07:42 PM    Protein, total 7.5 08/01/2020 07:42 PM    Albumin 3.9 08/01/2020 07:42 PM    Globulin 3.6 08/01/2020 07:42 PM    A-G Ratio 1.1 08/01/2020 07:42 PM    ALT (SGPT) 26 08/01/2020 07:42 PM      Lab Results   Component Value Date/Time    TSH 0.75 07/31/2020 03:14 AM           Assessment:           ICD-10-CM ICD-9-CM    1. CHB (complete heart block) (HCC)  I44.2 426.0    2. AV block, 1st degree  I44.0 426.11    3. Pacemaker  Z95.0 V45.01 AMB POC EKG ROUTINE W/ 12 LEADS, INTER & REP   4. Essential hypertension  I10 401.9    5. Hypercholesterolemia  E78.00 272.0    6. SSS (sick sinus syndrome) ([facility] [address]  I49.5 427.81      Orders Placed This Encounter    AMB POC EKG ROUTINE W/ 12 LEADS, INTER & REP     Order Specific Question:   Reason for Exam:     Answer:   routine        Plan:     Mr Jodee Mccormack is Successful dc ppm for chb 7/31/2020. He is 99.5% AP and 99.9% RVP. No new events. Echo 7/2020 with EF 55-60%. Given his ALANIS, I will repeat an echo. Enrolled in remote monitoring. Follow up in 1 year. Continue medical management for CHF, HTN and hyperlipidemia. Thank you for allowing me to participate in [facility] 's care. Johanna Rivera NP    Patient seen and examined by me with nurse practitioner.   I personally performed all components of the history, physical, and medical decision making and agree with the assessment and plan with minor modifications as noted. A paced 99% for sick sinus. V paced 99% for chb. He is having sob - will obtain an echo to assess lvef. Cont med rx for htn and hyperlipidemia. F/u in one year.     Chio Rivera MD, Neri Coleman

## 2020-12-01 NOTE — LETTER
12/1/20 Patient: Polo Choi YOB: 1928 Date of Visit: 12/1/2020 Najma Do MD 
[address] [address] [address] VIA Facsimile: [phone] Dear Najma Do MD, Thank you for referring Mr. Polo Choi to [address] [place] [address] for evaluation. My notes for this consultation are attached. If you have questions, please do not hesitate to call me. I look forward to following your patient along with you. Sincerely, Manda Lazaro MD

## 2020-12-01 NOTE — PROGRESS NOTES
Chief Complaint   Patient presents with    Pacemaker Check     PPM insert on     Palpitations     upon exertion     Shortness of Breath     upon exertion     Dizziness     feels unbalanced when walking     Ankle swelling     states better since meds were changed      1. Have you been to the ER, urgent care clinic since your last visit? Hospitalized since your last visit? No     2. Have you seen or consulted any other health care providers outside of the [facility] [place] [address] [facility] since your last visit? Include any pap smears or colon screening.   Yes [facility]

## 2020-12-09 ENCOUNTER — ANCILLARY PROCEDURE (OUTPATIENT)
Dept: CARDIOLOGY CLINIC | Age: 85
End: 2020-12-09
Payer: MEDICARE

## 2020-12-09 VITALS
DIASTOLIC BLOOD PRESSURE: 74 MMHG | SYSTOLIC BLOOD PRESSURE: 138 MMHG | BODY MASS INDEX: 28.34 KG/M2 | WEIGHT: 166 LBS | HEIGHT: 64 IN

## 2020-12-09 DIAGNOSIS — R06.09 DOE (DYSPNEA ON EXERTION): ICD-10-CM

## 2020-12-09 LAB
ECHO AO ASC DIAM: 2.9 CM
ECHO AO ROOT DIAM: 3.36 CM
ECHO AV AREA PEAK VELOCITY: 3.24 CM2
ECHO AV AREA/BSA PEAK VELOCITY: 1.8 CM2/M2
ECHO AV PEAK GRADIENT: 4.68 MMHG
ECHO AV PEAK VELOCITY: 99.46 CM/S
ECHO LA VOL 2C: 44.42 ML (ref 18–58)
ECHO LA VOL 4C: 29.05 ML (ref 18–58)
ECHO LA VOLUME INDEX A2C: 24.58 ML/M2 (ref 16–28)
ECHO LA VOLUME INDEX A4C: 16.08 ML/M2 (ref 16–28)
ECHO LV INTERNAL DIMENSION DIASTOLIC: 4.23 CM (ref 4.2–5.9)
ECHO LV INTERNAL DIMENSION SYSTOLIC: 2.33 CM
ECHO LV IVSD: 1.1 CM (ref 0.6–1)
ECHO LV MASS 2D: 158.8 G (ref 88–224)
ECHO LV MASS INDEX 2D: 87.9 G/M2 (ref 49–115)
ECHO LV POSTERIOR WALL DIASTOLIC: 1.1 CM (ref 0.6–1)
ECHO LVOT DIAM: 2.04 CM
ECHO LVOT PEAK GRADIENT: 3.88 MMHG
ECHO LVOT PEAK VELOCITY: 98.53 CM/S
ECHO LVOT SV: 75.6 ML
ECHO LVOT VTI: 23.11 CM
ECHO MV A VELOCITY: 85.74 CENTIMETER/SECOND
ECHO MV E DECELERATION TIME (DT): 294.86 MS
ECHO MV E VELOCITY: 70.49 CENTIMETER/SECOND
ECHO PV REGURGITANT MAX VELOCITY: 140.08 CM/S
ECHO RA AREA 4C: 12.62 CM2
LVOT MG: 2.41 MMHG

## 2020-12-09 PROCEDURE — 93306 TTE W/DOPPLER COMPLETE: CPT | Performed by: INTERNAL MEDICINE

## 2021-03-01 ENCOUNTER — OFFICE VISIT (OUTPATIENT)
Dept: CARDIOLOGY CLINIC | Age: 86
End: 2021-03-01
Payer: MEDICARE

## 2021-03-01 DIAGNOSIS — Z95.0 CARDIAC PACEMAKER IN SITU: Primary | ICD-10-CM

## 2021-03-01 DIAGNOSIS — I44.2 CHB (COMPLETE HEART BLOCK) (HCC): ICD-10-CM

## 2021-03-01 PROCEDURE — 93294 REM INTERROG EVL PM/LDLS PM: CPT | Performed by: INTERNAL MEDICINE

## 2021-03-01 PROCEDURE — 93296 REM INTERROG EVL PM/IDS: CPT | Performed by: INTERNAL MEDICINE

## 2021-03-02 NOTE — PROGRESS NOTES
[address] [facility], [address] [facility]  [phone]     Subjective:      OLESYA Oates is a 80 y.o. male is here for routine f/u. He has pmhx Diastolic HF, CHB/SSS s/p PM, carotid artery disease, HTN, GERD and CKD III. Last seen by us in 8/2020 and by EP in 12/2020. C/o significant ALANIS. He denies chest pain, orthopnea, PND, LE edema, palpitations, syncope, or presyncope. Echo 12/2020  · LV: Estimated LVEF is 60 - 65%. Normal cavity size, wall thickness and systolic function (ejection fraction normal). Wall motion: normal. Mild (grade 1) left ventricular diastolic dysfunction. · RV: Pacing wire present and appears normal.  · PV: Mild pulmonic valve regurgitation is present. Patient Active Problem List    Diagnosis Date Noted    SOB (shortness of breath) 03/04/2021    Cardiac pacemaker in situ 03/04/2021    Pacemaker 08/04/2020    Renal insufficiency 08/04/2020    CHB (complete heart block) ([address] 07/31/2020    Congestive heart failure ([address] 07/30/2020    CHF (congestive heart failure) ([address] 07/30/2020    Bilateral carotid artery stenosis 07/18/2019    Essential hypertension 07/18/2019    Hypercholesterolemia 06/17/2019    Mobitz type 1 second degree AV block 08/31/2017    PVC (premature ventricular contraction) 08/29/2017    Bradycardia 08/29/2017    AV block, 1st degree 08/29/2017    Right groin pain 07/09/2013      Raegan Brar MD  Past Medical History:   Diagnosis Date    Abdominal pain     Dyspepsia and other specified disorders of function of stomach     GERD (gastroesophageal reflux disease)     Hypercholesterolemia     Thyroid disease       Past Surgical History:   Procedure Laterality Date    HX CATARACT REMOVAL      bilateral    HX CATARACT REMOVAL      HX CHOLECYSTECTOMY      HX GI  2010    colon surgery?     HX HERNIA REPAIR      HX MALIGNANT SKIN LESION EXCISION Right 11/30/2020    right cheek     HX OPEN CHOLECYSTECTOMY      HX ORTHOPAEDIC      ingrown left toenail-great toe    HX OTHER SURGICAL      colonoscopy    HX TONSILLECTOMY      [place] INS NEW/RPLCMT PRM PM W/TRANSV ELTRD ATRIAL&VENT N/A 7/31/2020    INSERT PPM DUAL performed by Bry Calvert MD at [facility] CARDIAC CATH LAB     Allergies   Allergen Reactions    Morphine Nausea and Vomiting    Morphine Nausea and Vomiting    Pcn [Penicillins] Rash and Swelling    Pcn [Penicillins] Rash      Family History   Problem Relation Age of Onset    Heart Disease Mother     Heart Disease Father       Social History     Socioeconomic History    Marital status:      Spouse name: Not on file    Number of children: Not on file    Years of education: Not on file    Highest education level: Not on file   Occupational History    Not on file   Social Needs    Financial resource strain: Not on file    Food insecurity     Worry: Not on file     Inability: Not on file    Transportation needs     Medical: Not on file     Non-medical: Not on file   Tobacco Use    Smoking status: Never Smoker    Smokeless tobacco: Never Used   Substance and Sexual Activity    Alcohol use: Yes     Comment: social    Drug use: Never     Types: Prescription    Sexual activity: Not on file   Lifestyle    Physical activity     Days per week: Not on file     Minutes per session: Not on file    Stress: Not on file   Relationships    Social connections     Talks on phone: Not on file     Gets together: Not on file     Attends Alevism service: Not on file     Active member of club or organization: Not on file     Attends meetings of clubs or organizations: Not on file     Relationship status: Not on file    Intimate partner violence     Fear of current or ex partner: Not on file     Emotionally abused: Not on file     Physically abused: Not on file     Forced sexual activity: Not on file   Other Topics Concern    Not on file   Social History Narrative    ** Merged History Encounter **   Current Outpatient Medications   Medication Sig   • furosemide (LASIX) 20 mg tablet TAKE 1 TAB BY MOUTH DAILY. PATIENT HAS DIASTOLIC HF INDICATIONS: VISIBLE WATER RETENTION   • colestipoL (Colestid) 1 gram tablet Take 1 Tab by mouth two (2) times a day. Separate 1-2 hours from other drugs. Max: 4 tabs   • hydrALAZINE (APRESOLINE) 50 mg tablet Take 1 Tab by mouth two (2) times a day. (Patient taking differently: Take 25 mg by mouth two (2) times a day.)   • omeprazole (PRILOSEC) 20 mg capsule Take 20 mg by mouth daily.   • levothyroxine (SYNTHROID) 100 mcg tablet Take 100 mcg by mouth daily (before breakfast).   • metoprolol succinate (TOPROL-XL) 50 mg XL tablet Take 1 Tab by mouth daily. (Patient taking differently: Take 50 mg by mouth daily. 1/2  Tab twice daily)     No current facility-administered medications for this visit.          Review of Symptoms:  11 systems reviewed, negative other than as stated in the HPI    Physical ExamPhysical Exam:    Vitals:    03/04/21 1302   BP: (!) 150/66   Pulse: 80   Resp: 18   SpO2: 95%   Weight: 166 lb 4.8 oz (75.4 kg)   Height: 5' 4\" (1.626 m)     Body mass index is 28.55 kg/m².  General PE  Gen:  NAD  Mental Status - Alert. General Appearance - Not in acute distress.   HEENT:  PERRL, no carotid bruits or JVD  Chest and Lung Exam   Inspection: Accessory muscles - No use of accessory muscles in breathing.   Auscultation:   Breath sounds: - Normal.   Cardiovascular   Inspection: Jugular vein - Bilateral - Inspection Normal.   Palpation/Percussion:   Apical Impulse: - Normal.   Auscultation: Rhythm - Regular. Heart Sounds - S1 WNL and S2 WNL. No S3 or S4.   Murmurs & Other Heart Sounds: Auscultation of the heart reveals - No Murmurs.   Peripheral Vascular   Upper Extremity: Inspection - Bilateral - No Cyanotic nailbeds or Digital clubbing.   Lower Extremity:   Palpation: Edema - Bilateral - No edema.  Abdomen:   Soft, non-tender, bowel sounds are active.  Neuro: A&O times 3,  CN and motor grossly WNL    Labs:   Lab Results   Component Value Date/Time    Cholesterol, total 194 06/13/2018 03:27 PM    HDL Cholesterol 51 06/13/2018 03:27 PM    LDL, calculated 114 (H) 06/13/2018 03:27 PM    Triglyceride 143 06/13/2018 03:27 PM     Lab Results   Component Value Date/Time     07/30/2020 10:40 AM     Lab Results   Component Value Date/Time    Sodium 141 11/16/2020 04:26 PM    Potassium 4.5 11/16/2020 04:26 PM    Chloride 102 11/16/2020 04:26 PM    CO2 26 11/16/2020 04:26 PM    Anion gap 3 (L) 08/01/2020 07:42 PM    Glucose 115 (H) 11/16/2020 04:26 PM    BUN 32 11/16/2020 04:26 PM    Creatinine 1.71 (H) 11/16/2020 04:26 PM    BUN/Creatinine ratio 19 11/16/2020 04:26 PM    GFR est AA 39 (L) 11/16/2020 04:26 PM    GFR est non-AA 34 (L) 11/16/2020 04:26 PM    Calcium 9.3 11/16/2020 04:26 PM    Bilirubin, total 1.2 (H) 08/01/2020 07:42 PM    Alk. phosphatase 104 08/01/2020 07:42 PM    Protein, total 7.5 08/01/2020 07:42 PM    Albumin 3.9 08/01/2020 07:42 PM    Globulin 3.6 08/01/2020 07:42 PM    A-G Ratio 1.1 08/01/2020 07:42 PM    ALT (SGPT) 26 08/01/2020 07:42 PM        Assessment:     Assessment:      1. SOB (shortness of breath)    2. SSS (sick sinus syndrome) (HCC)    3. Cardiac pacemaker in situ    4. Essential hypertension    5. Hypercholesterolemia    6. Renal insufficiency    7. Bilateral carotid artery stenosis       Plan:     SOB: check leximyoview. Last Echo noted. Continue current meds.      HTN  Controlled with current therapy     CHB/SSS  S/p DC pacemaker on 07/2020  Continue routine device interrogation  Last seen by EP 12/2020: He is 99.5% AP and 99.9% RVP. No new events     CKD 3  Cr 1.71 in 11/2020    HLD  Check FLP. B/l carotid stenosis: asymptomatic. Medical treatment. Check FLP. Consider statin afterwards.      Mariah Sharma MD

## 2021-03-04 ENCOUNTER — OFFICE VISIT (OUTPATIENT)
Dept: CARDIOLOGY CLINIC | Age: 86
End: 2021-03-04
Payer: MEDICARE

## 2021-03-04 VITALS
RESPIRATION RATE: 18 BRPM | HEIGHT: 64 IN | OXYGEN SATURATION: 95 % | WEIGHT: 166.3 LBS | HEART RATE: 80 BPM | DIASTOLIC BLOOD PRESSURE: 66 MMHG | SYSTOLIC BLOOD PRESSURE: 150 MMHG | BODY MASS INDEX: 28.39 KG/M2

## 2021-03-04 DIAGNOSIS — R06.02 SOB (SHORTNESS OF BREATH): Primary | ICD-10-CM

## 2021-03-04 DIAGNOSIS — I10 ESSENTIAL HYPERTENSION: ICD-10-CM

## 2021-03-04 DIAGNOSIS — I65.23 BILATERAL CAROTID ARTERY STENOSIS: ICD-10-CM

## 2021-03-04 DIAGNOSIS — N28.9 RENAL INSUFFICIENCY: ICD-10-CM

## 2021-03-04 DIAGNOSIS — E78.00 HYPERCHOLESTEROLEMIA: ICD-10-CM

## 2021-03-04 DIAGNOSIS — Z95.0 CARDIAC PACEMAKER IN SITU: ICD-10-CM

## 2021-03-04 DIAGNOSIS — I49.5 SSS (SICK SINUS SYNDROME) (HCC): ICD-10-CM

## 2021-03-04 PROCEDURE — G8419 CALC BMI OUT NRM PARAM NOF/U: HCPCS | Performed by: INTERNAL MEDICINE

## 2021-03-04 PROCEDURE — 93005 ELECTROCARDIOGRAM TRACING: CPT | Performed by: INTERNAL MEDICINE

## 2021-03-04 PROCEDURE — G8427 DOCREV CUR MEDS BY ELIG CLIN: HCPCS | Performed by: INTERNAL MEDICINE

## 2021-03-04 PROCEDURE — 99214 OFFICE O/P EST MOD 30 MIN: CPT | Performed by: INTERNAL MEDICINE

## 2021-03-04 PROCEDURE — G8536 NO DOC ELDER MAL SCRN: HCPCS | Performed by: INTERNAL MEDICINE

## 2021-03-04 PROCEDURE — G8510 SCR DEP NEG, NO PLAN REQD: HCPCS | Performed by: INTERNAL MEDICINE

## 2021-03-04 PROCEDURE — G0463 HOSPITAL OUTPT CLINIC VISIT: HCPCS | Performed by: INTERNAL MEDICINE

## 2021-03-04 PROCEDURE — 93010 ELECTROCARDIOGRAM REPORT: CPT | Performed by: INTERNAL MEDICINE

## 2021-03-04 PROCEDURE — 1101F PT FALLS ASSESS-DOCD LE1/YR: CPT | Performed by: INTERNAL MEDICINE

## 2021-03-04 NOTE — PROGRESS NOTES
1. Have you been to the ER, urgent care clinic since your last visit? Hospitalized since your last visit? No.    2. Have you seen or consulted any other health care providers outside of the [address] [place] [address] since your last visit? Include any pap smears or colon screening.    No.      Chief Complaint   Patient presents with    Hypertension     6 month-soboe    Cholesterol Problem

## 2021-03-18 ENCOUNTER — ANCILLARY PROCEDURE (OUTPATIENT)
Dept: CARDIOLOGY CLINIC | Age: 86
End: 2021-03-18
Payer: MEDICARE

## 2021-03-18 VITALS — HEIGHT: 64 IN | WEIGHT: 166 LBS | BODY MASS INDEX: 28.34 KG/M2

## 2021-03-18 DIAGNOSIS — Z95.0 CARDIAC PACEMAKER IN SITU: ICD-10-CM

## 2021-03-18 DIAGNOSIS — N28.9 RENAL INSUFFICIENCY: ICD-10-CM

## 2021-03-18 DIAGNOSIS — I65.23 BILATERAL CAROTID ARTERY STENOSIS: ICD-10-CM

## 2021-03-18 DIAGNOSIS — E78.00 HYPERCHOLESTEROLEMIA: ICD-10-CM

## 2021-03-18 DIAGNOSIS — R06.02 SOB (SHORTNESS OF BREATH): ICD-10-CM

## 2021-03-18 DIAGNOSIS — I10 ESSENTIAL HYPERTENSION: ICD-10-CM

## 2021-03-18 DIAGNOSIS — I49.5 SSS (SICK SINUS SYNDROME) (HCC): ICD-10-CM

## 2021-03-18 PROCEDURE — 78452 HT MUSCLE IMAGE SPECT MULT: CPT | Performed by: INTERNAL MEDICINE

## 2021-03-18 PROCEDURE — A9502 TC99M TETROFOSMIN: HCPCS | Performed by: INTERNAL MEDICINE

## 2021-03-18 PROCEDURE — 93018 CV STRESS TEST I&R ONLY: CPT | Performed by: INTERNAL MEDICINE

## 2021-03-18 PROCEDURE — 93016 CV STRESS TEST SUPVJ ONLY: CPT | Performed by: INTERNAL MEDICINE

## 2021-03-18 RX ADMIN — TETROFOSMIN 9.9 MILLICURIE: 1.38 INJECTION, POWDER, LYOPHILIZED, FOR SOLUTION INTRAVENOUS at 13:35

## 2021-03-19 ENCOUNTER — APPOINTMENT (OUTPATIENT)
Dept: CARDIOLOGY CLINIC | Age: 86
End: 2021-03-19

## 2021-03-19 RX ADMIN — REGADENOSON 0.4 MG: 0.08 INJECTION, SOLUTION INTRAVENOUS at 13:22

## 2021-03-19 RX ADMIN — TETROFOSMIN 33.2 MILLICURIE: 1.38 INJECTION, POWDER, LYOPHILIZED, FOR SOLUTION INTRAVENOUS at 13:22

## 2021-03-22 ENCOUNTER — TELEPHONE (OUTPATIENT)
Dept: CARDIOLOGY CLINIC | Age: 86
End: 2021-03-22

## 2021-03-22 LAB
STRESS BASELINE DIAS BP: 70 MMHG
STRESS BASELINE HR: 72 BPM
STRESS BASELINE SYS BP: 138 MMHG
STRESS PEAK DIAS BP: 70 MMHG
STRESS PEAK SYS BP: 138 MMHG
STRESS PERCENT HR ACHIEVED: 75 %
STRESS POST PEAK HR: 96 BPM
STRESS RATE PRESSURE PRODUCT: NORMAL BPM*MMHG
STRESS ST DEPRESSION: 0 MM
STRESS ST ELEVATION: 0 MM
STRESS TARGET HR: 128 BPM

## 2021-03-22 NOTE — TELEPHONE ENCOUNTER
----- Message from Lana Klein MD sent at 3/22/2021  1:08 PM EDT -----  Inform him stress test is k      Called pt and left message to call me back. Called pt,verified pt with two pt identifiers, advised pt his stress test is normal and it would be in his my chart. Pt verbalized understanding.

## 2021-03-24 ENCOUNTER — TELEPHONE (OUTPATIENT)
Dept: CARDIOLOGY CLINIC | Age: 86
End: 2021-03-24

## 2021-03-24 NOTE — TELEPHONE ENCOUNTER
----- Message from Mariah Sharma MD sent at 3/24/2021  2:18 PM EDT -----  Check with him if he see Endo for thyroid. He can consider lipitor for mildly elevated LDL. Other labs are ok. Called pt,verified pt with two pt identifiers, advised pt his Thyroid function is low and I am wondering if he sees someone for his thyroid function. Pt advised no one has tested his Thyroid function since the 1960's. He advised  is refilling the medication. Advised I would send a fax with his recent labs to the PCP office in case they need to adjust his medication. Advised pt his LDL is elevated and  would like for him to go on Lipitor-pt could not understand what I was saying to him  And he ask I text him or email him. Advised I could send him a message to him thru my chart. Pt advised that would be good since he is hard of hearing and does not understand. Pt verbalized understanding.

## 2021-03-24 NOTE — TELEPHONE ENCOUNTER
Faxed recent lab work to  office at [phone] stating see recent thyroid function labs. Received fax confirmation.

## 2021-03-25 ENCOUNTER — DOCUMENTATION ONLY (OUTPATIENT)
Dept: CARDIOLOGY CLINIC | Age: 86
End: 2021-03-25

## 2021-03-25 ENCOUNTER — TELEPHONE (OUTPATIENT)
Dept: CARDIOLOGY CLINIC | Age: 86
End: 2021-03-25

## 2021-03-25 RX ORDER — ATORVASTATIN CALCIUM 10 MG/1
10 TABLET, FILM COATED ORAL DAILY
Qty: 30 TAB | Refills: 3 | Status: SHIPPED | OUTPATIENT
Start: 2021-03-25 | End: 2021-04-26

## 2021-03-25 NOTE — PROGRESS NOTES
Received call from [facility] at [facility] office and she gave me the fax # [phone] to fax over pt's labs and they would review. Advised her that the fax number I had at [phone] did not go thru yesterday. Faxed pt's lab results to Dr.Heatwole Javon at [phone] advising to look at recent thyroid function. Received fax confirmation.

## 2021-03-25 NOTE — TELEPHONE ENCOUNTER
Mr. Birgit Ram would like to try the Lipitor . Please send to [facility] in [place] and I will advise the pt. Thanks.

## 2021-03-31 ENCOUNTER — TELEPHONE (OUTPATIENT)
Dept: CARDIOLOGY CLINIC | Age: 86
End: 2021-03-31

## 2021-03-31 NOTE — TELEPHONE ENCOUNTER
Message  Received: Today  Message Contents   Karina Rodriguez MD sent to Sara Moore LPN   Caller: Unspecified (Today,  8:20 AM)             Sure. Sundar Blakely, will place in your mail box for signing. There are 2 pgs.  I labeled, just sign and I will fill out the rest. Thanks!!

## 2021-03-31 NOTE — TELEPHONE ENCOUNTER
Received disabled parking permits on pt. He is asking for you to sign off on them. Please advise if you will and I will give them to you. Thanks!

## 2021-04-01 NOTE — TELEPHONE ENCOUNTER
signed placards for [facility]mv. I filled out rest of info. Sent message to pt thru my chart to let him know they are ready for .

## 2021-04-26 RX ORDER — MONTELUKAST SODIUM 4 MG/1
TABLET, CHEWABLE ORAL
Qty: 180 TAB | Refills: 1 | Status: SHIPPED | OUTPATIENT
Start: 2021-04-26 | End: 2021-10-30

## 2021-04-26 RX ORDER — ATORVASTATIN CALCIUM 10 MG/1
TABLET, FILM COATED ORAL
Qty: 90 TAB | Refills: 1 | Status: SHIPPED | OUTPATIENT
Start: 2021-04-26 | End: 2021-12-02

## 2021-06-01 ENCOUNTER — OFFICE VISIT (OUTPATIENT)
Dept: CARDIOLOGY CLINIC | Age: 86
End: 2021-06-01
Payer: MEDICARE

## 2021-06-01 DIAGNOSIS — R00.1 BRADYCARDIA: ICD-10-CM

## 2021-06-01 DIAGNOSIS — I49.5 SSS (SICK SINUS SYNDROME) (HCC): ICD-10-CM

## 2021-06-01 DIAGNOSIS — Z95.0 CARDIAC PACEMAKER IN SITU: Primary | ICD-10-CM

## 2021-06-01 PROCEDURE — 93296 REM INTERROG EVL PM/IDS: CPT | Performed by: INTERNAL MEDICINE

## 2021-06-01 PROCEDURE — 93294 REM INTERROG EVL PM/LDLS PM: CPT | Performed by: INTERNAL MEDICINE

## 2021-08-03 PROBLEM — I50.9 CONGESTIVE HEART FAILURE (HCC): Status: RESOLVED | Noted: 2020-07-30 | Resolved: 2021-08-03

## 2021-09-09 ENCOUNTER — OFFICE VISIT (OUTPATIENT)
Dept: CARDIOLOGY CLINIC | Age: 86
End: 2021-09-09
Payer: MEDICARE

## 2021-09-09 VITALS
HEIGHT: 64 IN | RESPIRATION RATE: 16 BRPM | OXYGEN SATURATION: 95 % | SYSTOLIC BLOOD PRESSURE: 120 MMHG | HEART RATE: 72 BPM | DIASTOLIC BLOOD PRESSURE: 60 MMHG | WEIGHT: 167.9 LBS | BODY MASS INDEX: 28.66 KG/M2

## 2021-09-09 DIAGNOSIS — I10 ESSENTIAL HYPERTENSION: ICD-10-CM

## 2021-09-09 DIAGNOSIS — I44.2 CHB (COMPLETE HEART BLOCK) (HCC): ICD-10-CM

## 2021-09-09 DIAGNOSIS — I65.23 BILATERAL CAROTID ARTERY STENOSIS: ICD-10-CM

## 2021-09-09 DIAGNOSIS — E78.2 MIXED HYPERLIPIDEMIA: ICD-10-CM

## 2021-09-09 DIAGNOSIS — Z95.0 CARDIAC PACEMAKER IN SITU: ICD-10-CM

## 2021-09-09 DIAGNOSIS — R06.02 SOB (SHORTNESS OF BREATH): Primary | ICD-10-CM

## 2021-09-09 PROCEDURE — 1101F PT FALLS ASSESS-DOCD LE1/YR: CPT | Performed by: INTERNAL MEDICINE

## 2021-09-09 PROCEDURE — G0463 HOSPITAL OUTPT CLINIC VISIT: HCPCS | Performed by: INTERNAL MEDICINE

## 2021-09-09 PROCEDURE — 93010 ELECTROCARDIOGRAM REPORT: CPT | Performed by: INTERNAL MEDICINE

## 2021-09-09 PROCEDURE — 99214 OFFICE O/P EST MOD 30 MIN: CPT | Performed by: INTERNAL MEDICINE

## 2021-09-09 PROCEDURE — 93005 ELECTROCARDIOGRAM TRACING: CPT | Performed by: INTERNAL MEDICINE

## 2021-09-09 PROCEDURE — G8510 SCR DEP NEG, NO PLAN REQD: HCPCS | Performed by: INTERNAL MEDICINE

## 2021-09-09 PROCEDURE — G8427 DOCREV CUR MEDS BY ELIG CLIN: HCPCS | Performed by: INTERNAL MEDICINE

## 2021-09-09 PROCEDURE — G8536 NO DOC ELDER MAL SCRN: HCPCS | Performed by: INTERNAL MEDICINE

## 2021-09-09 PROCEDURE — G8419 CALC BMI OUT NRM PARAM NOF/U: HCPCS | Performed by: INTERNAL MEDICINE

## 2021-09-09 NOTE — PROGRESS NOTES
[address], [address] [facility]  [phone]     Subjective:      OLESYA Loco is a 80 y.o. male is here for routine f/u. He has pmhx Diastolic HF, CHB/SSS s/p PM, carotid artery disease, HTN, GERD and CKD III. Last seen by us in 3/2021. At that time he c/o salas, we obtained NST as posted below. Today, sob still comes and goes but not as prominent as prev. He is not sure of the medicines he's taking and will have to call us back. The patient denies chest pain, orthopnea, PND, LE edema, palpitations, syncope, or presyncope. NST 3/2021  · Baseline ECG: Paced rhythm. · SPECT: Left ventricular function post-stress was normal. Calculated ejection fraction is 61%. There is no evidence of transient ischemic dilation (TID). · SPECT: Myocardial perfusion imaging defect 1: There is a defect that is small in size with a mild reduction in uptake present in the apex location(s) that is predominantly fixed. There is normal wall motion in the defect area. Viability in the area is good. The defect appears to probably be artifact. · SPECT: Left ventricular perfusion is normal. a low risk stress test.     Echo 12/2020  · LV: Estimated LVEF is 60 - 65%. Normal cavity size, wall thickness and systolic function (ejection fraction normal). Wall motion: normal. Mild (grade 1) left ventricular diastolic dysfunction. · RV: Pacing wire present and appears normal.  · PV: Mild pulmonic valve regurgitation is present.      Patient Active Problem List    Diagnosis Date Noted    SOB (shortness of breath) 03/04/2021    Cardiac pacemaker in situ 03/04/2021    Pacemaker 08/04/2020    Renal insufficiency 08/04/2020    CHB (complete heart block) ([address] 07/31/2020    CHF (congestive heart failure) ([address] 07/30/2020    Bilateral carotid artery stenosis 07/18/2019    Essential hypertension 07/18/2019    Hypercholesterolemia 06/17/2019    Mobitz type 1 second degree AV block 08/31/2017    PVC (premature ventricular contraction) 08/29/2017    Bradycardia 08/29/2017    AV block, 1st degree 08/29/2017    Right groin pain 07/09/2013      Ines Ashley MD  Past Medical History:   Diagnosis Date    Abdominal pain     Dyspepsia and other specified disorders of function of stomach     GERD (gastroesophageal reflux disease)     Hypercholesterolemia     Thyroid disease       Past Surgical History:   Procedure Laterality Date    HX CATARACT REMOVAL      bilateral    HX CATARACT REMOVAL      HX CHOLECYSTECTOMY      HX GI  2010    colon surgery?     HX HERNIA REPAIR      HX MALIGNANT SKIN LESION EXCISION Right 11/30/2020    right cheek     HX OPEN CHOLECYSTECTOMY      HX ORTHOPAEDIC      ingrown left toenail-great toe    HX OTHER SURGICAL      colonoscopy    HX TONSILLECTOMY      [place] INS NEW/RPLCMT PRM PM W/TRANSV ELTRD ATRIAL&VENT N/A 7/31/2020    INSERT PPM DUAL performed by Shavonne Dunn MD at [facility] CARDIAC CATH LAB     Allergies   Allergen Reactions    Morphine Nausea and Vomiting    Morphine Nausea and Vomiting    Pcn [Penicillins] Rash and Swelling    Pcn [Penicillins] Rash      Family History   Problem Relation Age of Onset    Heart Disease Mother     Heart Disease Father       Social History     Socioeconomic History    Marital status:      Spouse name: Not on file    Number of children: Not on file    Years of education: Not on file    Highest education level: Not on file   Occupational History    Not on file   Tobacco Use    Smoking status: Never Smoker    Smokeless tobacco: Never Used   Substance and Sexual Activity    Alcohol use: Yes     Comment: social    Drug use: Never     Types: Prescription    Sexual activity: Not on file   Other Topics Concern    Not on file   Social History Narrative    ** Merged History Encounter **          Social Determinants of Health     Financial Resource Strain:     Difficulty of Paying Living Expenses:    Food Insecurity:  Worried About [facility] in the Last Year:    [address] in the Last Year:    Transportation Needs:     Lack of Transportation (Medical):  Lack of Transportation (Non-Medical):    Physical Activity:     Days of Exercise per Week:     Minutes of Exercise per Session:    Stress:     Feeling of Stress :    Social Connections:     Frequency of Communication with Friends and Family:     Frequency of Social Gatherings with Friends and Family:     Attends Evangelical Services:     Active Member of Clubs or Organizations:     Attends Club or Organization Meetings:     Marital Status:    Intimate Partner Violence:     Fear of Current or Ex-Partner:     Emotionally Abused:     Physically Abused:     Sexually Abused:       Current Outpatient Medications   Medication Sig    colestipoL (COLESTID) 1 gram tablet TAKE 1 TAB BY MOUTH TWO (2) TIMES A DAY. SEPARATE 1-2 HOURS FROM OTHER DRUGS    atorvastatin (LIPITOR) 10 mg tablet TAKE 1 TABLET BY MOUTH EVERY DAY    furosemide (LASIX) 20 mg tablet TAKE 1 TAB BY MOUTH DAILY. PATIENT HAS DIASTOLIC HF INDICATIONS: VISIBLE WATER RETENTION    hydrALAZINE (APRESOLINE) 50 mg tablet Take 1 Tab by mouth two (2) times a day. (Patient taking differently: Take 25 mg by mouth two (2) times a day.)    metoprolol succinate (TOPROL-XL) 50 mg XL tablet Take 1 Tab by mouth daily. (Patient taking differently: Take 50 mg by mouth daily. 1/2  Tab twice daily)    omeprazole (PRILOSEC) 20 mg capsule Take 20 mg by mouth daily.  levothyroxine (SYNTHROID) 100 mcg tablet Take 100 mcg by mouth daily (before breakfast). No current facility-administered medications for this visit. Review of Symptoms:  11 systems reviewed, negative other than as stated in the HPI    Physical ExamPhysical Exam:    There were no vitals filed for this visit. There is no height or weight on file to calculate BMI. General PE  Gen:  NAD  Mental Status - Alert.  General Appearance - Not in acute distress. HEENT:  PERRL, no carotid bruits or JVD  Chest and Lung Exam   Inspection: Accessory muscles - No use of accessory muscles in breathing. Auscultation:   Breath sounds: - Normal.   Cardiovascular   Inspection: Jugular vein - Bilateral - Inspection Normal.   Palpation/Percussion:   Apical Impulse: - Normal.   Auscultation: Rhythm - Regular. Heart Sounds - S1 WNL and S2 WNL. No S3 or S4. Murmurs & Other Heart Sounds: Auscultation of the heart reveals - No Murmurs. Peripheral Vascular   Upper Extremity: Inspection - Bilateral - No Cyanotic nailbeds or Digital clubbing. Lower Extremity:   Palpation: Edema - Bilateral - No edema. Abdomen:   Soft, non-tender, bowel sounds are active. Neuro: A&O times 3, CN and motor grossly WNL    Labs:   Lab Results   Component Value Date/Time    Cholesterol, total 180 03/23/2021 10:47 AM    Cholesterol, total 194 06/13/2018 03:27 PM    HDL Cholesterol 49 03/23/2021 10:47 AM    HDL Cholesterol 51 06/13/2018 03:27 PM    LDL, calculated 110.2 (H) 03/23/2021 10:47 AM    LDL, calculated 114 (H) 06/13/2018 03:27 PM    Triglyceride 104 03/23/2021 10:47 AM    Triglyceride 143 06/13/2018 03:27 PM    CHOL/HDL Ratio 3.7 03/23/2021 10:47 AM     Lab Results   Component Value Date/Time     07/30/2020 10:40 AM     Lab Results   Component Value Date/Time    Sodium 139 03/23/2021 10:47 AM    Potassium 4.4 03/23/2021 10:47 AM    Chloride 109 (H) 03/23/2021 10:47 AM    CO2 25 03/23/2021 10:47 AM    Anion gap 5 03/23/2021 10:47 AM    Glucose 90 03/23/2021 10:47 AM    BUN 33 (H) 03/23/2021 10:47 AM    Creatinine 1.58 (H) 03/23/2021 10:47 AM    BUN/Creatinine ratio 21 (H) 03/23/2021 10:47 AM    GFR est AA 50 (L) 03/23/2021 10:47 AM    GFR est non-AA 41 (L) 03/23/2021 10:47 AM    Calcium 8.8 03/23/2021 10:47 AM    Bilirubin, total 0.8 03/23/2021 10:47 AM    Alk.  phosphatase 89 03/23/2021 10:47 AM    Protein, total 6.2 (L) 03/23/2021 10:47 AM    Albumin 3.7 03/23/2021 10:47 AM    Globulin 2.5 03/23/2021 10:47 AM    A-G Ratio 1.5 03/23/2021 10:47 AM    ALT (SGPT) 20 03/23/2021 10:47 AM        Assessment:     Assessment:      1. SOB (shortness of breath)    2. Essential hypertension    3. CHB (complete heart block) (HCC)    4. Cardiac pacemaker in situ    5. Bilateral carotid artery stenosis    6. Mixed hyperlipidemia       Plan:     SOB  Negative NST in 3/2021  Normal EF with no significant valvular pathology per echo in 12/2020  Continue current meds.      HTN  Controlled---unsure if taking hydralazine 25 or 50 mg BID  Will call us back     CHB/SSS  S/p DC pacemaker on 07/2020  Continue routine device interrogation,  Followed by EP  Last seen by EP 12/2020: He is 99.5% AP and 99.9% RVP. No new events     CKD 3  Cr 1.71 in 11/2020    HLD  3/2021  started on atorva 10 mg  Since  Will repeat labs    B/l carotid stenosis:   + bruit  asymptomatic. Medical treatment. Continue current care and f/u 6 mos    Ursula Lofton NP       Patient seen and examined by me with nurse practitioner. I personally performed all components of the history, physical, and medical decision making and agree with the assessment and plan as noted. Stable from CV standpoint. Last stress test noted. BP controlled. No further cardiac work up. Check labs. F/u in 6 months.      Fritz Cortez MD

## 2021-09-09 NOTE — PROGRESS NOTES
Chief Complaint   Patient presents with    Follow-up    CHF    Hypertension    Cholesterol Problem     1. Have you been to the ER, urgent care clinic since your last visit? No Hospitalized since your last visit? No    2. Have you seen or consulted any other health care providers outside of the [address] [place] [address] since your last visit? Yes Eye exam ,  hearing & dentist Include any pap smears or colon screening. No    Patient C/O SOB at times not sure of his medications.

## 2021-09-13 ENCOUNTER — OFFICE VISIT (OUTPATIENT)
Dept: CARDIOLOGY CLINIC | Age: 86
End: 2021-09-13
Payer: MEDICARE

## 2021-09-13 ENCOUNTER — TELEPHONE (OUTPATIENT)
Dept: CARDIOLOGY CLINIC | Age: 86
End: 2021-09-13

## 2021-09-13 DIAGNOSIS — I44.2 CHB (COMPLETE HEART BLOCK) (HCC): ICD-10-CM

## 2021-09-13 DIAGNOSIS — Z95.0 CARDIAC PACEMAKER IN SITU: Primary | ICD-10-CM

## 2021-09-13 PROCEDURE — 93296 REM INTERROG EVL PM/IDS: CPT | Performed by: INTERNAL MEDICINE

## 2021-09-13 PROCEDURE — 93294 REM INTERROG EVL PM/LDLS PM: CPT | Performed by: INTERNAL MEDICINE

## 2021-09-13 NOTE — TELEPHONE ENCOUNTER
----- Message from Tristan Robbins NP sent at 9/12/2021  3:07 PM EDT -----  LDL much better with statin, continue. Kidney fxn at baseline, other labs lytes /liver panel ok      Called pt and left message cell phone-home number no voice mail.

## 2021-09-15 ENCOUNTER — TELEPHONE (OUTPATIENT)
Dept: CARDIOLOGY CLINIC | Age: 86
End: 2021-09-15

## 2021-09-15 NOTE — TELEPHONE ENCOUNTER
----- Message from Nadege Shipman NP sent at 9/12/2021  3:07 PM EDT -----  LDL much better with statin, continue. Kidney fxn at baseline, other labs lytes /liver panel ok      Pt returned call,verified pt with two pt identifiers, advised pt I was calling him regarding his lab results. His kidney fxn, at baseline and other labs/lytes and liver fxn okay. Advised his LDL is is much better with statin. Advised I sent him a message thru my chart with lab results also. Pt advised he has viewed on my chart last week. Pt verbalized understanding.

## 2021-09-21 ENCOUNTER — TRANSCRIBE ORDER (OUTPATIENT)
Dept: REGISTRATION | Age: 86
End: 2021-09-21

## 2021-09-21 ENCOUNTER — HOSPITAL ENCOUNTER (OUTPATIENT)
Dept: GENERAL RADIOLOGY | Age: 86
Discharge: HOME | End: 2021-09-21
Payer: MEDICARE

## 2021-09-21 DIAGNOSIS — R06.89 DYSPNEA AND RESPIRATORY ABNORMALITY: Primary | ICD-10-CM

## 2021-09-21 DIAGNOSIS — R06.00 DYSPNEA AND RESPIRATORY ABNORMALITY: Primary | ICD-10-CM

## 2021-09-21 DIAGNOSIS — R06.89 DYSPNEA AND RESPIRATORY ABNORMALITY: ICD-10-CM

## 2021-09-21 DIAGNOSIS — R06.00 DYSPNEA AND RESPIRATORY ABNORMALITY: ICD-10-CM

## 2021-09-21 PROCEDURE — 71046 X-RAY EXAM CHEST 2 VIEWS: CPT

## 2021-10-30 RX ORDER — MONTELUKAST SODIUM 4 MG/1
TABLET, CHEWABLE ORAL
Qty: 180 TABLET | Refills: 1 | Status: SHIPPED | OUTPATIENT
Start: 2021-10-30 | End: 2022-04-25 | Stop reason: SDUPTHER

## 2021-12-02 RX ORDER — ATORVASTATIN CALCIUM 10 MG/1
TABLET, FILM COATED ORAL
Qty: 90 TABLET | Refills: 1 | Status: SHIPPED | OUTPATIENT
Start: 2021-12-02

## 2022-01-27 ENCOUNTER — TELEPHONE (OUTPATIENT)
Dept: CARDIOLOGY CLINIC | Age: 87
End: 2022-01-27

## 2022-01-27 NOTE — TELEPHONE ENCOUNTER
1-27-22@ 9:16am, called pt to resched missed 1-19 appt, called home# vmbox is full, left msg on cell# , waiting for pt to callback     1-28-22@ 10:59am - pt left vm to resched his 1-19 appts, left vm new appt date is 2-24-22 @ 10:15am device ck and ov 10:20am

## 2022-02-07 NOTE — TELEPHONE ENCOUNTER
Patient said the [facility] pharmacy sent in a renewall for his medication last week and they still have not heard back and patient is out of pills for several days now. Told him you would send message through [facility].     Owen Piper

## 2022-02-08 RX ORDER — HYDRALAZINE HYDROCHLORIDE 50 MG/1
50 TABLET, FILM COATED ORAL 2 TIMES DAILY
Qty: 60 TABLET | Refills: 3 | Status: SHIPPED | OUTPATIENT
Start: 2022-02-08

## 2022-02-22 NOTE — PROGRESS NOTES
ELECTROPHYSIOLOGY        Subjective:      OLESYA Coffey is a 80 y.o. male is here for an EP follow up. He denies chest pain, worsening SOB/ALANIS. Denies orthopnea, PND, LE edema, palpitations, syncope, presyncope or fatigue. Patient Active Problem List    Diagnosis Date Noted    SOB (shortness of breath) 03/04/2021    Cardiac pacemaker in situ 03/04/2021    Pacemaker 08/04/2020    Renal insufficiency 08/04/2020    CHB (complete heart block) ([facility] [address] 07/31/2020    CHF (congestive heart failure) ([facility] [address] 07/30/2020    Bilateral carotid artery stenosis 07/18/2019    Essential hypertension 07/18/2019    Hypercholesterolemia 06/17/2019    Mobitz type 1 second degree AV block 08/31/2017    PVC (premature ventricular contraction) 08/29/2017    Bradycardia 08/29/2017    AV block, 1st degree 08/29/2017    Right groin pain 07/09/2013      Desmond Chanel MD  Past Medical History:   Diagnosis Date    Abdominal pain     Congestive heart failure (HCC)     Dyspepsia and other specified disorders of function of stomach     Essential hypertension     GERD (gastroesophageal reflux disease)     Hypercholesterolemia     Pacemaker 08/01/2020    MDT    Thyroid disease       Past Surgical History:   Procedure Laterality Date    HX CATARACT REMOVAL      bilateral    HX CATARACT REMOVAL      HX CHOLECYSTECTOMY      HX GI  2010    colon surgery?     HX HERNIA REPAIR      HX MALIGNANT SKIN LESION EXCISION Right 11/30/2020    right cheek     HX OPEN CHOLECYSTECTOMY      HX ORTHOPAEDIC      ingrown left toenail-great toe    HX OTHER SURGICAL      colonoscopy    HX TONSILLECTOMY      VT INS NEW/RPLCMT PRM PM W/TRANSV ELTRD ATRIAL&VENT N/A 7/31/2020    INSERT PPM DUAL performed by Naina Mendez MD at [facility] CARDIAC CATH LAB     Allergies   Allergen Reactions    Morphine Nausea and Vomiting    Morphine Nausea and Vomiting    Pcn [Penicillins] Rash and Swelling    Pcn [Penicillins] Rash      Family History   Problem Relation Age of Onset    Heart Disease Mother     Heart Disease Father     negative for cardiac disease  Social History     Socioeconomic History    Marital status:    Tobacco Use    Smoking status: Never Smoker    Smokeless tobacco: Never Used   Vaping Use    Vaping Use: Never used   Substance and Sexual Activity    Alcohol use: Yes     Comment: social    Drug use: Never     Types: Prescription    Sexual activity: Not Currently   Social History Narrative    ** Merged History Encounter **          Current Outpatient Medications   Medication Sig    erythromycin (ILOTYCIN) ophthalmic ointment Apply 3.5 g to eye daily.  hydrALAZINE (APRESOLINE) 50 mg tablet Take 1 Tablet by mouth two (2) times a day.  furosemide (LASIX) 20 mg tablet TAKE 1 TAB BY MOUTH DAILY. PATIENT HAS DIASTOLIC HF INDICATIONS: VISIBLE WATER RETENTION    atorvastatin (LIPITOR) 10 mg tablet TAKE 1 TABLET BY MOUTH EVERY DAY    colestipoL (COLESTID) 1 gram tablet TAKE 1 TAB BY MOUTH TWO (2) TIMES A DAY. SEPARATE 1-2 HOURS FROM OTHER DRUGS    omeprazole (PRILOSEC) 20 mg capsule Take 20 mg by mouth daily.  levothyroxine (SYNTHROID) 100 mcg tablet Take 100 mcg by mouth daily (before breakfast).  metoprolol succinate (TOPROL-XL) 50 mg XL tablet Take 1 Tab by mouth daily. (Patient taking differently: Take 50 mg by mouth daily. 25 mg BID per patient 9/9/21)     No current facility-administered medications for this visit. Vitals:    02/24/22 1011   BP: 122/76   Pulse: 69   Resp: 18   SpO2: 97%   Weight: 168 lb (76.2 kg)   Height: 5' 4\" (1.626 m)       I have reviewed the nurses notes, vitals, problem list, allergy list, medical history, family, social history and medications. Review of Symptoms:    General: Pt denies excessive weight gain or loss. Pt is able to conduct ADL's  HEENT: Denies blurred vision, headaches, epistaxis and difficulty swallowing.   Respiratory: reports stable shortness of breath, ALANIS, Denies wheezing or stridor. Cardiovascular: Denies precordial pain, palpitations, edema or PND  Gastrointestinal: Denies poor appetite, indigestion, abdominal pain or blood in stool  Urinary: Denies dysuria, pyuria  Musculoskeletal: Denies pain or swelling from muscles or joints  Neurologic: Denies tremor, paresthesias, or sensory motor disturbance  Skin: Denies rash, itching or texture change. Psych: Denies depression      Physical Exam:      General: Well developed, in no acute distress. HEENT: Eyes - PERRL, no jvd  Heart:  Normal S1/S2 negative S3 or S4. Regular, no murmur, gallop or rub. Respiratory: Clear bilaterally x 4, no wheezing or rales  Extremities:  No edema, normal cap refill, no cyanosis. Musculoskeletal: No clubbing  Neuro: A&Ox3, speech clear, gait stable. Skin: Skin color is normal. No rashes or lesions. Non diaphoretic. no ulcers  Vascular: 2+ pulses symmetric in all extremities  Psych - judgement intact and orientation is wnl       Cardiographics      Results for orders placed or performed during the hospital encounter of 07/30/20   EKG, 12 LEAD, INITIAL   Result Value Ref Range    Ventricular Rate 84 BPM    Atrial Rate 84 BPM    P-R Interval 178 ms    QRS Duration 172 ms    Q-T Interval 464 ms    QTC Calculation (Bezet) 548 ms    Calculated P Axis 107 degrees    Calculated R Axis -80 degrees    Calculated T Axis 88 degrees    Diagnosis       AV dual-paced rhythm    Confirmed by Ronni Dumont MD, Charley Rivera ([id number]) on 8/1/2020 2:56:19 PM       Echo:  · LV: Normal cavity size. Mild concentric hypertrophy. Mild-to-moderate systolic function. Estimated left ventricular ejection fraction is 55 - 60%. · RA: Moderately dilated right atrium. · MV: Mitral valve thickening. Mild mitral valve regurgitation is present. · PA: Mild to moderate pulmonary hypertension.     Lab Results   Component Value Date/Time    WBC 6.9 08/01/2020 07:42 PM    HGB 13.7 08/01/2020 07:42 PM    HCT 41.8 08/01/2020 07:42 PM PLATELET 877 97/62/7302 07:42 PM    MCV 95.0 08/01/2020 07:42 PM      Lab Results   Component Value Date/Time    Sodium 140 09/10/2021 02:50 PM    Potassium 4.5 09/10/2021 02:50 PM    Chloride 108 09/10/2021 02:50 PM    CO2 25 09/10/2021 02:50 PM    Anion gap 7 09/10/2021 02:50 PM    Glucose 95 09/10/2021 02:50 PM    BUN 28 (H) 09/10/2021 02:50 PM    Creatinine 1.65 (H) 09/10/2021 02:50 PM    BUN/Creatinine ratio 17 09/10/2021 02:50 PM    GFR est AA 48 (L) 09/10/2021 02:50 PM    GFR est non-AA 39 (L) 09/10/2021 02:50 PM    Calcium 8.7 09/10/2021 02:50 PM    Bilirubin, total 0.9 09/10/2021 02:50 PM    Alk. phosphatase 90 09/10/2021 02:50 PM    Protein, total 6.2 (L) 09/10/2021 02:50 PM    Albumin 3.5 09/10/2021 02:50 PM    Globulin 2.7 09/10/2021 02:50 PM    A-G Ratio 1.3 09/10/2021 02:50 PM    ALT (SGPT) 21 09/10/2021 02:50 PM      Lab Results   Component Value Date/Time    TSH 0.06 (L) 03/23/2021 10:47 AM           Assessment:           ICD-10-CM ICD-9-CM    1. CHB (complete heart block) ([facility])  I44.2 426.0    2. SSS (sick sinus syndrome) ([facility])  I49.5 427.81    3. Cardiac pacemaker in situ  Z95.0 V45.01    4. Essential hypertension  I10 401.9    5. Hypercholesterolemia  E78.00 272.0    6. Chronic diastolic congestive heart failure (HCC)  I50.32 428.32      428.0      Orders Placed This Encounter    erythromycin (ILOTYCIN) ophthalmic ointment     Sig: Apply 3.5 g to eye daily. Plan:     Mr Kvng Maxwell is here today for an EP follow up appt. He is 92% AP and 99.8% RVP. No new events. Battery life 9 years. Echo 12/2020 with EF 55-60%, no valvular disease of significance. He reports his SOB/ALANIS is stable. Enrolled in remote monitoring. Follow up in 1 year. Continue medical management for CHF, HTN and hyperlipidemia. Thank you for allowing me to participate in Meagan 's care.       Bisi Garzon NP

## 2022-02-24 ENCOUNTER — OFFICE VISIT (OUTPATIENT)
Dept: CARDIOLOGY CLINIC | Age: 87
End: 2022-02-24
Payer: MEDICARE

## 2022-02-24 VITALS
HEART RATE: 69 BPM | BODY MASS INDEX: 28.68 KG/M2 | HEIGHT: 64 IN | WEIGHT: 168 LBS | RESPIRATION RATE: 18 BRPM | OXYGEN SATURATION: 97 % | SYSTOLIC BLOOD PRESSURE: 122 MMHG | DIASTOLIC BLOOD PRESSURE: 76 MMHG

## 2022-02-24 DIAGNOSIS — I50.32 CHRONIC DIASTOLIC CONGESTIVE HEART FAILURE (HCC): ICD-10-CM

## 2022-02-24 DIAGNOSIS — Z95.0 CARDIAC PACEMAKER IN SITU: ICD-10-CM

## 2022-02-24 DIAGNOSIS — I49.5 SSS (SICK SINUS SYNDROME) (HCC): ICD-10-CM

## 2022-02-24 DIAGNOSIS — I44.2 CHB (COMPLETE HEART BLOCK) (HCC): ICD-10-CM

## 2022-02-24 DIAGNOSIS — Z95.0 CARDIAC PACEMAKER IN SITU: Primary | ICD-10-CM

## 2022-02-24 DIAGNOSIS — I44.2 CHB (COMPLETE HEART BLOCK) (HCC): Primary | ICD-10-CM

## 2022-02-24 DIAGNOSIS — I10 ESSENTIAL HYPERTENSION: ICD-10-CM

## 2022-02-24 DIAGNOSIS — E78.00 HYPERCHOLESTEROLEMIA: ICD-10-CM

## 2022-02-24 PROCEDURE — 1101F PT FALLS ASSESS-DOCD LE1/YR: CPT | Performed by: NURSE PRACTITIONER

## 2022-02-24 PROCEDURE — 99214 OFFICE O/P EST MOD 30 MIN: CPT | Performed by: NURSE PRACTITIONER

## 2022-02-24 PROCEDURE — G8427 DOCREV CUR MEDS BY ELIG CLIN: HCPCS | Performed by: NURSE PRACTITIONER

## 2022-02-24 PROCEDURE — 93280 PM DEVICE PROGR EVAL DUAL: CPT | Performed by: INTERNAL MEDICINE

## 2022-02-24 PROCEDURE — G8536 NO DOC ELDER MAL SCRN: HCPCS | Performed by: NURSE PRACTITIONER

## 2022-02-24 PROCEDURE — G8432 DEP SCR NOT DOC, RNG: HCPCS | Performed by: NURSE PRACTITIONER

## 2022-02-24 PROCEDURE — G8419 CALC BMI OUT NRM PARAM NOF/U: HCPCS | Performed by: NURSE PRACTITIONER

## 2022-02-24 PROCEDURE — G0463 HOSPITAL OUTPT CLINIC VISIT: HCPCS | Performed by: NURSE PRACTITIONER

## 2022-02-24 RX ORDER — ERYTHROMYCIN 5 MG/G
3.5 OINTMENT OPHTHALMIC
COMMUNITY
Start: 2022-02-22

## 2022-02-24 NOTE — LETTER
2/24/2022    Patient: Landon Barone   YOB: 1928   Date of Visit: 2/24/2022     Sheree Kapadia, [address] [place] [address]  [place] [id number]  Via Fax: [phone]    Dear Sheree Kapadia MD,      Thank you for referring Mr. Landon Barone to [facility] for evaluation. My notes for this consultation are attached. If you have questions, please do not hesitate to call me. I look forward to following your patient along with you.       Sincerely,    Radha Ferguson, NP

## 2022-02-24 NOTE — PROGRESS NOTES
Chief Complaint   Patient presents with    Pacemaker Check     Annual follow up- Denies cardiac sx. 1. Have you been to the ER, urgent care clinic since your last visit? Hospitalized since your last visit? No    2. Have you seen or consulted any other health care providers outside of the [address] [place] [address] since your last visit? Yes, PCP for annual check up.

## 2022-03-10 ENCOUNTER — OFFICE VISIT (OUTPATIENT)
Dept: CARDIOLOGY CLINIC | Age: 87
End: 2022-03-10
Payer: MEDICARE

## 2022-03-10 VITALS
RESPIRATION RATE: 18 BRPM | HEIGHT: 64 IN | DIASTOLIC BLOOD PRESSURE: 60 MMHG | OXYGEN SATURATION: 97 % | HEART RATE: 71 BPM | SYSTOLIC BLOOD PRESSURE: 126 MMHG | WEIGHT: 168 LBS | BODY MASS INDEX: 28.68 KG/M2

## 2022-03-10 DIAGNOSIS — I50.32 CHRONIC DIASTOLIC CONGESTIVE HEART FAILURE (HCC): ICD-10-CM

## 2022-03-10 DIAGNOSIS — I10 ESSENTIAL HYPERTENSION: ICD-10-CM

## 2022-03-10 DIAGNOSIS — Z95.0 CARDIAC PACEMAKER IN SITU: ICD-10-CM

## 2022-03-10 DIAGNOSIS — E78.00 HYPERCHOLESTEROLEMIA: Primary | ICD-10-CM

## 2022-03-10 DIAGNOSIS — I65.23 BILATERAL CAROTID ARTERY STENOSIS: ICD-10-CM

## 2022-03-10 PROCEDURE — 99214 OFFICE O/P EST MOD 30 MIN: CPT | Performed by: INTERNAL MEDICINE

## 2022-03-10 PROCEDURE — G8427 DOCREV CUR MEDS BY ELIG CLIN: HCPCS | Performed by: INTERNAL MEDICINE

## 2022-03-10 PROCEDURE — G8510 SCR DEP NEG, NO PLAN REQD: HCPCS | Performed by: INTERNAL MEDICINE

## 2022-03-10 PROCEDURE — 93010 ELECTROCARDIOGRAM REPORT: CPT | Performed by: INTERNAL MEDICINE

## 2022-03-10 PROCEDURE — G8536 NO DOC ELDER MAL SCRN: HCPCS | Performed by: INTERNAL MEDICINE

## 2022-03-10 PROCEDURE — 93005 ELECTROCARDIOGRAM TRACING: CPT | Performed by: INTERNAL MEDICINE

## 2022-03-10 PROCEDURE — G8419 CALC BMI OUT NRM PARAM NOF/U: HCPCS | Performed by: INTERNAL MEDICINE

## 2022-03-10 PROCEDURE — G0463 HOSPITAL OUTPT CLINIC VISIT: HCPCS | Performed by: INTERNAL MEDICINE

## 2022-03-10 PROCEDURE — 1101F PT FALLS ASSESS-DOCD LE1/YR: CPT | Performed by: INTERNAL MEDICINE

## 2022-03-10 NOTE — PROGRESS NOTES
3/10/2022 3:35 PM      Subjective:     OLESYA Hunter is here for f/u visit. Overall stable. He denies chest pain, chest pressure/discomfort, dyspnea, palpitations, irregular heart beats, near-syncope, syncope, fatigue, orthopnea, paroxysmal nocturnal dyspnea, exertional chest pressure/discomfort, claudication, lower extremity edema. Visit Vitals  /60 (BP 1 Location: Left arm, BP Patient Position: Sitting, BP Cuff Size: Large adult)   Pulse 71   Resp 18   Ht 5' 4\" (1.626 m)   Wt 168 lb (76.2 kg)   SpO2 97%   BMI 28.84 kg/m²     Current Outpatient Medications   Medication Sig    erythromycin (ILOTYCIN) ophthalmic ointment Apply 3.5 g to eye daily as needed.  hydrALAZINE (APRESOLINE) 50 mg tablet Take 1 Tablet by mouth two (2) times a day.  furosemide (LASIX) 20 mg tablet TAKE 1 TAB BY MOUTH DAILY. PATIENT HAS DIASTOLIC HF INDICATIONS: VISIBLE WATER RETENTION    atorvastatin (LIPITOR) 10 mg tablet TAKE 1 TABLET BY MOUTH EVERY DAY    colestipoL (COLESTID) 1 gram tablet TAKE 1 TAB BY MOUTH TWO (2) TIMES A DAY. SEPARATE 1-2 HOURS FROM OTHER DRUGS    omeprazole (PRILOSEC) 20 mg capsule Take 20 mg by mouth daily.  levothyroxine (SYNTHROID) 100 mcg tablet Take 100 mcg by mouth daily (before breakfast).  metoprolol succinate (TOPROL-XL) 50 mg XL tablet Take 1 Tab by mouth daily. (Patient not taking: Reported on 3/10/2022)     No current facility-administered medications for this visit.          Objective:      Visit Vitals  /60 (BP 1 Location: Left arm, BP Patient Position: Sitting, BP Cuff Size: Large adult)   Pulse 71   Resp 18   Ht 5' 4\" (1.626 m)   Wt 168 lb (76.2 kg)   SpO2 97%   BMI 28.84 kg/m²       Past Medical History:   Diagnosis Date    Abdominal pain     Congestive heart failure (HCC)     Dyspepsia and other specified disorders of function of stomach     Essential hypertension     GERD (gastroesophageal reflux disease)     Hypercholesterolemia     Pacemaker 08/01/2020    MDT    Thyroid disease       Past Surgical History:   Procedure Laterality Date    HX CATARACT REMOVAL      bilateral    HX CATARACT REMOVAL      HX CHOLECYSTECTOMY      HX GI  2010    colon surgery?  HX HERNIA REPAIR      HX MALIGNANT SKIN LESION EXCISION Right 11/30/2020    right cheek     HX OPEN CHOLECYSTECTOMY      HX ORTHOPAEDIC      ingrown left toenail-great toe    HX OTHER SURGICAL      colonoscopy    HX TONSILLECTOMY      KS INS NEW/RPLCMT PRM PM W/TRANSV ELTRD ATRIAL&VENT N/A 7/31/2020    INSERT PPM DUAL performed by Delmy Ruggiero MD at [facility] CARDIAC CATH LAB     Allergies   Allergen Reactions    Morphine Nausea and Vomiting    Morphine Nausea and Vomiting    Pcn [Penicillins] Rash and Swelling    Pcn [Penicillins] Rash      Family History   Problem Relation Age of Onset    Heart Disease Mother     Heart Disease Father       Social History     Socioeconomic History    Marital status:      Spouse name: Not on file    Number of children: Not on file    Years of education: Not on file    Highest education level: Not on file   Occupational History    Not on file   Tobacco Use    Smoking status: Never Smoker    Smokeless tobacco: Never Used   Vaping Use    Vaping Use: Never used   Substance and Sexual Activity    Alcohol use: Yes     Comment: social    Drug use: Never     Types: Prescription    Sexual activity: Not Currently   Other Topics Concern    Not on file   Social History Narrative    ** Merged History Encounter **          Social Determinants of Health     Financial Resource Strain:     Difficulty of Paying Living Expenses: Not on file   Food Insecurity:     Worried About Running Out of Food in the Last Year: Not on file    Lazaro of Food in the Last Year: Not on file   Transportation Needs:     Lack of Transportation (Medical): Not on file    Lack of Transportation (Non-Medical):  Not on file   Physical Activity:     Days of Exercise per Week: Not on file    Minutes of Exercise per Session: Not on file   Stress:     Feeling of Stress : Not on file   Social Connections:     Frequency of Communication with Friends and Family: Not on file    Frequency of Social Gatherings with Friends and Family: Not on file    Attends [employer] Services: Not on file    Active Member of [address] [place] [address] [employer] or Organizations: Not on file    Attends Club or Organization Meetings: Not on file    Marital Status: Not on file   Intimate Partner Violence:     Fear of Current or Ex-Partner: Not on file    Emotionally Abused: Not on file    Physically Abused: Not on file    Sexually Abused: Not on file   Housing Stability:     Unable to Pay for Housing in the Last Year: Not on file    Number of [place] in the Last Year: Not on file    Unstable Housing in the Last Year: Not on file       Assessment:       ICD-10-CM ICD-9-CM    1. Hypercholesterolemia  E78.00 272.0    2. Chronic diastolic congestive heart failure (HCC)  I50.32 428.32 AMB POC EKG ROUTINE W/ 12 LEADS, INTER & REP     428.0    3. Cardiac pacemaker in situ  Z95.0 V45.01    4. Bilateral carotid artery stenosis  I65.23 433.10      433.30    5. Essential hypertension  I10 401.9        Plan:     Negative NST in 3/2021  Normal EF with no significant valvular pathology per echo in 12/2020  Stable. Continue current meds.      HTN  Controlled. Continue current meds.      CHB/SSS  S/p PPM.     HLD  On statin. Last FLP noted.      B/l carotid stenosis:   asymptomatic. Last carotid doppler noted. Medical treatment.

## 2022-03-18 PROBLEM — I50.9 CHF (CONGESTIVE HEART FAILURE) (HCC): Status: ACTIVE | Noted: 2020-07-30

## 2022-03-18 PROBLEM — E78.00 HYPERCHOLESTEROLEMIA: Status: ACTIVE | Noted: 2019-06-17

## 2022-03-18 PROBLEM — R00.1 BRADYCARDIA: Status: ACTIVE | Noted: 2017-08-29

## 2022-03-18 PROBLEM — I44.1 MOBITZ TYPE 1 SECOND DEGREE AV BLOCK: Status: ACTIVE | Noted: 2017-08-31

## 2022-03-19 PROBLEM — R06.02 SOB (SHORTNESS OF BREATH): Status: ACTIVE | Noted: 2021-03-04

## 2022-03-19 PROBLEM — I65.23 BILATERAL CAROTID ARTERY STENOSIS: Status: ACTIVE | Noted: 2019-07-18

## 2022-03-20 PROBLEM — I49.3 PVC (PREMATURE VENTRICULAR CONTRACTION): Status: ACTIVE | Noted: 2017-08-29

## 2022-04-25 RX ORDER — MONTELUKAST SODIUM 4 MG/1
TABLET, CHEWABLE ORAL
Qty: 180 TABLET | Refills: 3 | Status: SHIPPED | OUTPATIENT
Start: 2022-04-25

## 2023-05-22 RX ORDER — FUROSEMIDE 20 MG/1
20 TABLET ORAL DAILY
COMMUNITY
Start: 2021-12-02

## 2023-05-22 RX ORDER — METOPROLOL SUCCINATE 50 MG/1
50 TABLET, EXTENDED RELEASE ORAL DAILY
COMMUNITY
Start: 2020-07-31

## 2023-05-22 RX ORDER — ERYTHROMYCIN 5 MG/G
3.5 OINTMENT OPHTHALMIC DAILY PRN
COMMUNITY
Start: 2022-02-22

## 2023-05-22 RX ORDER — HYDRALAZINE HYDROCHLORIDE 50 MG/1
50 TABLET, FILM COATED ORAL 2 TIMES DAILY
COMMUNITY
Start: 2022-02-08

## 2023-05-22 RX ORDER — OMEPRAZOLE 20 MG/1
20 CAPSULE, DELAYED RELEASE ORAL DAILY
COMMUNITY

## 2023-05-22 RX ORDER — ATORVASTATIN CALCIUM 10 MG/1
1 TABLET, FILM COATED ORAL DAILY
COMMUNITY
Start: 2021-12-02

## 2023-05-22 RX ORDER — MONTELUKAST SODIUM 4 MG/1
TABLET, CHEWABLE ORAL
COMMUNITY
Start: 2022-04-25

## 2023-05-22 RX ORDER — LEVOTHYROXINE SODIUM 0.1 MG/1
100 TABLET ORAL
COMMUNITY

## 2023-07-19 ENCOUNTER — TRANSCRIBE ORDERS (OUTPATIENT)
Facility: HOSPITAL | Age: 88
End: 2023-07-19

## 2023-07-19 DIAGNOSIS — I65.29 STENOSIS OF CAROTID ARTERY, UNSPECIFIED LATERALITY: Primary | ICD-10-CM

## 2024-01-25 NOTE — PROGRESS NOTES
Chief Complaint   Patient presents with    CHF     6 month follow up     Shortness of Breath     upon little exertion - comes and goes - saw pulmonary - \"not the lungs\"    Insomnia     having trouble sleeping - ? due to meds      1. Have you been to the ER, urgent care clinic since your last visit? Hospitalized since your last visit? No     2. Have you seen or consulted any other health care providers outside of the [address] [place] [address] since your last visit? Include any pap smears or colon screening.   No No indicators present

## (undated) DEVICE — REM POLYHESIVE ADULT PATIENT RETURN ELECTRODE: Brand: VALLEYLAB

## (undated) DEVICE — SUT SLK 0 30IN SH BLK --

## (undated) DEVICE — MEDI-TRACE CADENCE ADULT, DEFIBRILLATION ELECTRODE -RTS  (10 PR/PK) - PHILIPS: Brand: MEDI-TRACE CADENCE

## (undated) DEVICE — PREP CHLORAPREP 10.5 ML ORG --

## (undated) DEVICE — AIRLIFE™  ADULT CUSHION NASAL CANNULA WITH 7 FOOT (2.1 M) CRUSH-RESISTANT OXYGEN TUBING, AND U/CONNECT-IT ADAPTER: Brand: AIRLIFE™

## (undated) DEVICE — SUTURE ABSORBABLE BRAIDED 2-0 CT-1 27 IN UD VICRYL J259H

## (undated) DEVICE — ZIP 8I SURGICAL SKIN CLOSURE DEVICE: Brand: ZIP 8I SURGICAL SKIN CLOSURE DEVICE

## (undated) DEVICE — ADULT SPO2 SENSOR: Brand: NELLCOR

## (undated) DEVICE — INTRO SHTH 7FR 13X20CM -- TEARAWAY

## (undated) DEVICE — TRAY,IRRIGATION,PISTON SYRINGE,60ML,STRL: Brand: MEDLINE

## (undated) DEVICE — 3M™ IOBAN™ 2 ANTIMICROBIAL INCISE DRAPE 6650EZ: Brand: IOBAN™ 2

## (undated) DEVICE — SUTURE VCRL SZ 2-0 L36IN ABSRB UD L40MM CT 1/2 CIR J957H

## (undated) DEVICE — PACEMAKER PACK: Brand: MEDLINE INDUSTRIES, INC.

## (undated) DEVICE — LIMB HOLDER, WRIST/ANKLE: Brand: DEROYAL

## (undated) DEVICE — KIT ACCS INTRO 4FR L10CM NDL 21GA L7CM GWIRE L40CM

## (undated) DEVICE — Z DISCONTINUED NO SUB IDED SET EXTN W/ 4 W STPCOCK M SPIN LOK 36IN

## (undated) DEVICE — DRESSING FOAM W10XL15CM VISCOSE POLY SAFETAC NONWOVEN PERF

## (undated) DEVICE — INTRO SHTH 9FR 13X20CM -- USE ITEM# 341577